# Patient Record
Sex: FEMALE | Race: WHITE | NOT HISPANIC OR LATINO | Employment: OTHER | URBAN - METROPOLITAN AREA
[De-identification: names, ages, dates, MRNs, and addresses within clinical notes are randomized per-mention and may not be internally consistent; named-entity substitution may affect disease eponyms.]

---

## 2017-09-07 ENCOUNTER — TRANSCRIBE ORDERS (OUTPATIENT)
Dept: ADMINISTRATIVE | Facility: HOSPITAL | Age: 57
End: 2017-09-07

## 2017-09-07 DIAGNOSIS — Z12.31 VISIT FOR SCREENING MAMMOGRAM: Primary | ICD-10-CM

## 2017-09-19 ENCOUNTER — HOSPITAL ENCOUNTER (OUTPATIENT)
Dept: MAMMOGRAPHY | Facility: HOSPITAL | Age: 57
Discharge: HOME | End: 2017-09-19
Attending: OBSTETRICS & GYNECOLOGY | Admitting: OBSTETRICS & GYNECOLOGY

## 2017-09-19 DIAGNOSIS — Z12.31 VISIT FOR SCREENING MAMMOGRAM: ICD-10-CM

## 2017-09-19 PROCEDURE — 77063 BREAST TOMOSYNTHESIS BI: CPT

## 2017-09-19 PROCEDURE — G0202 SCR MAMMO BI INCL CAD: HCPCS

## 2018-09-19 ENCOUNTER — TRANSCRIBE ORDERS (OUTPATIENT)
Dept: ADMINISTRATIVE | Facility: HOSPITAL | Age: 58
End: 2018-09-19

## 2018-09-19 DIAGNOSIS — Z12.31 VISIT FOR SCREENING MAMMOGRAM: Primary | ICD-10-CM

## 2018-10-17 ENCOUNTER — HOSPITAL ENCOUNTER (OUTPATIENT)
Dept: MAMMOGRAPHY | Facility: HOSPITAL | Age: 58
Discharge: HOME | End: 2018-10-17
Admitting: OBSTETRICS & GYNECOLOGY

## 2018-10-17 DIAGNOSIS — Z12.31 VISIT FOR SCREENING MAMMOGRAM: ICD-10-CM

## 2018-10-17 PROCEDURE — 77063 BREAST TOMOSYNTHESIS BI: CPT

## 2018-10-17 PROCEDURE — 77067 SCR MAMMO BI INCL CAD: CPT

## 2018-11-02 ENCOUNTER — TRANSCRIBE ORDERS (OUTPATIENT)
Dept: ADMINISTRATIVE | Facility: HOSPITAL | Age: 58
End: 2018-11-02

## 2018-11-02 DIAGNOSIS — N63.0 BREAST NODULE: Primary | ICD-10-CM

## 2018-11-07 ENCOUNTER — HOSPITAL ENCOUNTER (OUTPATIENT)
Dept: ULTRASOUND IMAGING | Facility: HOSPITAL | Age: 58
Discharge: HOME | End: 2018-11-07

## 2018-11-07 ENCOUNTER — HOSPITAL ENCOUNTER (OUTPATIENT)
Dept: MAMMOGRAPHY | Facility: HOSPITAL | Age: 58
Discharge: HOME | End: 2018-11-07
Admitting: OBSTETRICS & GYNECOLOGY

## 2018-11-07 DIAGNOSIS — N63.0 BREAST NODULE: ICD-10-CM

## 2018-11-07 PROCEDURE — G0279 TOMOSYNTHESIS, MAMMO: HCPCS

## 2018-11-07 PROCEDURE — 76642 ULTRASOUND BREAST LIMITED: CPT

## 2018-11-07 PROCEDURE — 77065 DX MAMMO INCL CAD UNI: CPT

## 2019-04-10 ENCOUNTER — TRANSCRIBE ORDERS (OUTPATIENT)
Dept: ADMINISTRATIVE | Facility: HOSPITAL | Age: 59
End: 2019-04-10

## 2019-04-10 DIAGNOSIS — R00.2 PALPITATIONS: Primary | ICD-10-CM

## 2019-04-12 ENCOUNTER — HOSPITAL ENCOUNTER (OUTPATIENT)
Dept: CARDIOLOGY | Facility: HOSPITAL | Age: 59
Discharge: HOME | End: 2019-04-12
Admitting: FAMILY MEDICINE

## 2019-04-12 DIAGNOSIS — R00.2 PALPITATIONS: ICD-10-CM

## 2019-04-12 PROCEDURE — 93226 XTRNL ECG REC<48 HR SCAN A/R: CPT

## 2019-04-12 PROCEDURE — 93225 XTRNL ECG REC<48 HRS REC: CPT

## 2019-04-17 PROCEDURE — 93227 XTRNL ECG REC<48 HR R&I: CPT | Performed by: INTERNAL MEDICINE

## 2019-05-08 ENCOUNTER — TRANSCRIBE ORDERS (OUTPATIENT)
Dept: ADMINISTRATIVE | Facility: HOSPITAL | Age: 59
End: 2019-05-08

## 2019-05-08 DIAGNOSIS — N60.01 BENIGN BREAST CYST IN FEMALE, RIGHT: Primary | ICD-10-CM

## 2019-05-14 ENCOUNTER — APPOINTMENT (OUTPATIENT)
Dept: MAMMOGRAPHY | Facility: HOSPITAL | Age: 59
End: 2019-05-14

## 2019-06-03 ENCOUNTER — HOSPITAL ENCOUNTER (OUTPATIENT)
Dept: ULTRASOUND IMAGING | Facility: HOSPITAL | Age: 59
Discharge: HOME | End: 2019-06-03

## 2019-06-03 ENCOUNTER — HOSPITAL ENCOUNTER (OUTPATIENT)
Dept: MAMMOGRAPHY | Facility: HOSPITAL | Age: 59
Discharge: HOME | End: 2019-06-03
Admitting: OBSTETRICS & GYNECOLOGY

## 2019-06-03 DIAGNOSIS — N60.01 BENIGN BREAST CYST IN FEMALE, RIGHT: ICD-10-CM

## 2019-06-03 PROCEDURE — 77065 DX MAMMO INCL CAD UNI: CPT

## 2019-06-03 PROCEDURE — 76642 ULTRASOUND BREAST LIMITED: CPT

## 2019-06-03 PROCEDURE — G0279 TOMOSYNTHESIS, MAMMO: HCPCS

## 2019-06-04 ENCOUNTER — TRANSCRIBE ORDERS (OUTPATIENT)
Dept: ADMINISTRATIVE | Facility: HOSPITAL | Age: 59
End: 2019-06-04

## 2019-06-04 DIAGNOSIS — N64.9 BREAST LESION: ICD-10-CM

## 2019-06-04 DIAGNOSIS — R92.8 ABNORMAL MAMMOGRAM: Primary | ICD-10-CM

## 2019-06-13 ENCOUNTER — HOSPITAL ENCOUNTER (OUTPATIENT)
Dept: MAMMOGRAPHY | Facility: HOSPITAL | Age: 59
Discharge: HOME | End: 2019-06-13
Admitting: RADIOLOGY

## 2019-06-13 DIAGNOSIS — R92.8 ABNORMAL MAMMOGRAM: ICD-10-CM

## 2019-06-13 DIAGNOSIS — N64.9 BREAST LESION: ICD-10-CM

## 2019-06-13 PROCEDURE — 88360 TUMOR IMMUNOHISTOCHEM/MANUAL: CPT | Performed by: OBSTETRICS & GYNECOLOGY

## 2019-06-13 PROCEDURE — 88305 TISSUE EXAM BY PATHOLOGIST: CPT | Performed by: OBSTETRICS & GYNECOLOGY

## 2019-06-13 PROCEDURE — 88342 IMHCHEM/IMCYTCHM 1ST ANTB: CPT | Performed by: OBSTETRICS & GYNECOLOGY

## 2019-06-13 PROCEDURE — 88341 IMHCHEM/IMCYTCHM EA ADD ANTB: CPT | Performed by: OBSTETRICS & GYNECOLOGY

## 2019-06-13 RX ORDER — LIDOCAINE HYDROCHLORIDE AND EPINEPHRINE 20; 5 MG/ML; UG/ML
10 INJECTION, SOLUTION EPIDURAL; INFILTRATION; INTRACAUDAL; PERINEURAL ONCE
Status: COMPLETED | OUTPATIENT
Start: 2019-06-13 | End: 2019-06-13

## 2019-06-13 RX ORDER — LIDOCAINE HYDROCHLORIDE 10 MG/ML
10 INJECTION, SOLUTION INFILTRATION; PERINEURAL ONCE
Status: COMPLETED | OUTPATIENT
Start: 2019-06-13 | End: 2019-06-13

## 2019-06-13 RX ADMIN — LIDOCAINE HYDROCHLORIDE 10 ML: 10 INJECTION, SOLUTION INFILTRATION; PERINEURAL at 14:15

## 2019-06-13 RX ADMIN — LIDOCAINE HYDROCHLORIDE,EPINEPHRINE BITARTRATE 10 ML: 20; .005 INJECTION, SOLUTION EPIDURAL; INFILTRATION; INTRACAUDAL; PERINEURAL at 14:15

## 2019-06-14 ENCOUNTER — APPOINTMENT (OUTPATIENT)
Dept: MAMMOGRAPHY | Facility: HOSPITAL | Age: 59
End: 2019-06-14

## 2019-06-19 ENCOUNTER — TELEPHONE (OUTPATIENT)
Dept: SURGERY | Facility: CLINIC | Age: 59
End: 2019-06-19

## 2019-06-19 NOTE — TELEPHONE ENCOUNTER
Breast MRI is scheduled on 6/20/2019 @ 7:45am.    Appointment with Dr. Ayala is on 6/24/2019 @ 8:30am.    Called and spoke to patient, patient expressed verbal understanding of appointment times.

## 2019-06-20 ENCOUNTER — HOSPITAL ENCOUNTER (OUTPATIENT)
Dept: MRI IMAGING | Facility: HOSPITAL | Age: 59
Discharge: HOME | End: 2019-06-20
Admitting: SURGERY

## 2019-06-20 DIAGNOSIS — Z17.0 MALIGNANT NEOPLASM OF RIGHT BREAST IN FEMALE, ESTROGEN RECEPTOR POSITIVE, UNSPECIFIED SITE OF BREAST (HCC): ICD-10-CM

## 2019-06-20 DIAGNOSIS — C50.911 MALIGNANT NEOPLASM OF RIGHT BREAST IN FEMALE, ESTROGEN RECEPTOR POSITIVE, UNSPECIFIED SITE OF BREAST (HCC): ICD-10-CM

## 2019-06-20 LAB
CYTO UR: NORMAL
LAB AP CASE REPORT: NORMAL
LAB AP DIAGNOSIS COMMENT: NORMAL
LAB AP SPECIAL STAINS: NORMAL
LAB AP SYNOPTIC CHECKLIST: NORMAL
PATH REPORT.ADDENDUM SPEC: NORMAL
PATH REPORT.FINAL DX SPEC: NORMAL
PATH REPORT.GROSS SPEC: NORMAL

## 2019-06-20 PROCEDURE — 0 GADOBENATE DIMEGLUMINE 529 MG/ML SOLUTION: Performed by: SURGERY

## 2019-06-20 PROCEDURE — A9577 INJ MULTIHANCE: HCPCS | Performed by: SURGERY

## 2019-06-20 PROCEDURE — 77049 MRI BREAST C-+ W/CAD BI: CPT

## 2019-06-20 PROCEDURE — 82565 ASSAY OF CREATININE: CPT

## 2019-06-20 RX ADMIN — GADOBENATE DIMEGLUMINE 20 ML: 529 INJECTION, SOLUTION INTRAVENOUS at 08:36

## 2019-06-21 LAB — CREAT BLDA-MCNC: 0.8 MG/DL (ref 0.6–1.3)

## 2019-06-24 ENCOUNTER — OFFICE VISIT (OUTPATIENT)
Dept: SURGERY | Facility: CLINIC | Age: 59
End: 2019-06-24

## 2019-06-24 ENCOUNTER — TELEPHONE (OUTPATIENT)
Dept: SURGERY | Facility: CLINIC | Age: 59
End: 2019-06-24

## 2019-06-24 ENCOUNTER — PREP FOR SURGERY (OUTPATIENT)
Dept: OTHER | Facility: HOSPITAL | Age: 59
End: 2019-06-24

## 2019-06-24 VITALS
SYSTOLIC BLOOD PRESSURE: 130 MMHG | DIASTOLIC BLOOD PRESSURE: 78 MMHG | HEART RATE: 87 BPM | WEIGHT: 212 LBS | OXYGEN SATURATION: 97 % | HEIGHT: 62 IN | BODY MASS INDEX: 39.01 KG/M2

## 2019-06-24 DIAGNOSIS — C50.311 MALIGNANT NEOPLASM OF LOWER-INNER QUADRANT OF RIGHT BREAST OF FEMALE, ESTROGEN RECEPTOR POSITIVE (HCC): Primary | ICD-10-CM

## 2019-06-24 DIAGNOSIS — Z17.0 MALIGNANT NEOPLASM OF LOWER-INNER QUADRANT OF RIGHT BREAST OF FEMALE, ESTROGEN RECEPTOR POSITIVE (HCC): Primary | ICD-10-CM

## 2019-06-24 PROCEDURE — 99244 OFF/OP CNSLTJ NEW/EST MOD 40: CPT | Performed by: SURGERY

## 2019-06-24 RX ORDER — LIRAGLUTIDE 6 MG/ML
INJECTION, SOLUTION SUBCUTANEOUS
COMMUNITY
Start: 2019-06-16 | End: 2021-11-22

## 2019-06-24 RX ORDER — HEPARIN SODIUM 5000 [USP'U]/ML
5000 INJECTION, SOLUTION INTRAVENOUS; SUBCUTANEOUS
Status: CANCELLED | OUTPATIENT
Start: 2019-07-08 | End: 2019-07-09

## 2019-06-24 RX ORDER — CEFAZOLIN SODIUM 2 G/100ML
2 INJECTION, SOLUTION INTRAVENOUS ONCE
Status: CANCELLED | OUTPATIENT
Start: 2019-07-08 | End: 2019-06-24

## 2019-06-24 NOTE — PROGRESS NOTES
[facility]     Referring Provider: George Strange MD    Chief complaint: Newly diagnosed breast cancer     HPI: Ms. Emily Azevedo is a 60 yo woman, seen at the request of Dr. George Strange, for a new diagnosis of right breast cancer. This was initially detected as an imaging abnormality. Her work-up is detailed in the oncologic history below. She denies any  breast lumps, pain, skin changes, or nipple discharge. She has a past history of a benign right breast biopsy in 2006. She denies any family history of breast or ovarian cancer. She was joined today in clinic by her , Mendez. She gave consent for her  to be present during her examination and participate in the discussion.       Oncology/Hematology History    9/19/17, Screening MMG with Daniel ( [facility]):  BI-RADS 2: Benign.     [facility]17[facility]18[facility]  There are scattered areas of fibroglandular density. Previous percutaneous biopsy clip marker in the upper central right breast. There is a new, irregular nodular opacity in the deep medial right breast along the 3:00 axis near the chest wall measuring just over 1 cm in size, newly visible since prior studies. There is a new circumscribed nodule in the medial right breast about 4 cm from the nipple that is enlarged since the prior exam. This may represent a cyst.                                 BI-RADS 0: Incomplete.    11/07/18, Right Diagnostic MMG & Right Breast US ([facility]):  MMG:  Small focal ill-defined opacity is again noted within the deep medial right breast along the 3:00 axis. This is visible in retrospect on each of the prior studies, and may therefore potentially represent focally prominent normal fibroglandular tissue.   US:  Despite prolonged imaging, no focal lesion could be identified within this portion of the medial right breast. Two benign cysts in the medial right breast near the nipple measuring 0.9 cm and 0.6 cm, corresponding to  benign-appearing nodular opacities visible on mammogram today.        BI-RADS 3: Probably benign. Follow-up MMG in six months.        Malignant neoplasm of lower-inner quadrant of right breast of female, estrogen receptor positive (CMS/HCC)    6/2/2019 Initial Diagnosis     Malignant neoplasm of lower-inner quadrant of right breast of female, estrogen receptor positive (CMS/HCC)         6/3/2019 Imaging     Right Diagnostic MMG & Right Breast US ( [facility]):    MMG:  Small focal irregular opacity is again noted along the 3:00 axis in the deep portion of the right breast about 9 cm from the nipple measuring about 8 mm. Linear opacities surround the lesion in a radial pattern. The morphology is concerning, particularly on tomogram images today.   US:  High-resolution grayscale ultrasound imaging directed to this portion of the breast today does show a subtle focus of acoustic shadowing, although mass is not clearly delineated.   BI-RADS 4: Suspicious.         6/13/2019 Biopsy     Right breast, Stereotactic biopsy ( [facility]):    1. Breast, Right Deep Medial 3 O'clock Position, Core Biopsy: Breast parenchyma with               A.  Minimally represented invasive carcinoma of no special type (ductal) measuring 2.8 mm maximally                     on the slide, Meghan histologic grade I (tubules = 1, nuclei = 1, mitoses = 1), with associated ductal                     carcinoma in situ (DCIS) with cribriform and micropapillary architecture and low nuclear grade.    ER+ (%, strong)  WA+ (41-50%, moderate)  HER2 negative (IHC 1+)   Ki-67 1%         6/20/2019 Imaging     Bilateral Breast MRI ( [facility]):  The mammogram showed a very tiny cluster of microcalcifications in the lower inner quadrant of the right breast that were biopsied under stereotactic guidance. The biopsy marking clip is identified in the lower inner quadrant at approximately the 4:00 location. It is contained within the inferior aspect of a tubular shaped  "biopsy tract containing a small amount of subacute hemorrhage. This measures 9 mm in width and 4.7 cm in length. There is only a thin rind of low-level enhancement surrounding the biopsy clip and the tract that is consistent with enhancement due to postoperative change. No suspicious above threshold enhancement is identified. There is no MRI evidence of significant residual neoplasm. The patient certainly appears to be a candidate for breast conservation. No discrete mass is identified. Incidental note is made of an 8 mm diameter cyst in the anterior aspect of the right breast at the 3:00 location. There are a few scattered benign-appearing stippled areas of enhancement within the left breast. A small briskly enhancing lymph node is also noted at the 3:00 location within the middle third of the left breast. There is no axillary or internal mammary lymphadenopathy. The chest wall is unremarkable.   BI-RADS 6: Known malignancy.            Review of Systems   Constitutional: Positive for diaphoresis, fatigue and unexpected weight change. Negative for appetite change, chills and fever.        Postmenopausal symptoms   HENT:   Positive for mouth sores and tinnitus. Negative for hearing loss, lump/mass, nosebleeds, sore throat, trouble swallowing and voice change.         Ringing in ears due to hearing aides.  \"cold sores\"    Eyes: Negative for eye problems and icterus.   Respiratory: Negative for chest tightness, cough, hemoptysis, shortness of breath and wheezing.    Cardiovascular: Positive for palpitations. Negative for chest pain and leg swelling.        Followed by Dr. Cheng.   Gastrointestinal: Negative for abdominal distention, abdominal pain, blood in stool, constipation, diarrhea, nausea, rectal pain and vomiting.   Endocrine: Positive for hot flashes.   Genitourinary: Negative for bladder incontinence, difficulty urinating, dyspareunia, dysuria, frequency, hematuria, menstrual problem, nocturia, pelvic pain, " vaginal bleeding and vaginal discharge.    Musculoskeletal: Negative for arthralgias, back pain, flank pain, gait problem, myalgias, neck pain and neck stiffness.   Skin: Negative for itching, rash and wound.   Neurological: Positive for dizziness. Negative for extremity weakness, gait problem, headaches, light-headedness, numbness, seizures and speech difficulty.        Balance problems, due to vertigo   Hematological: Negative for adenopathy. Does not bruise/bleed easily.   Psychiatric/Behavioral: Negative for confusion, decreased concentration, depression, sleep disturbance and suicidal ideas. The patient is not nervous/anxious.    I personally reviewed and updated the ROS.      Medications:    Current Outpatient Medications:   •  celecoxib (CeleBREX) 200 MG capsule, Take 200 mg by mouth 2 (two) times a day. LAST DOSE 7/17/16, Disp: , Rfl:   •  cetirizine (ZyrTEC) 10 MG tablet, Take 10 mg by mouth daily., Disp: , Rfl:   •  diclofenac (VOLTAREN) 1 % gel gel, Apply 4 g topically 4 (four) times a day as needed., Disp: , Rfl:   •  DULoxetine (CYMBALTA) 60 MG capsule, Take 60 mg by mouth daily. LAST DOSE 7/17/16, Disp: , Rfl:   •  levothyroxine (SYNTHROID, LEVOTHROID) 100 MCG tablet, Take 100 mcg by mouth daily., Disp: , Rfl:   •  losartan-hydrochlorothiazide (HYZAAR) 100-12.5 MG per tablet, Take 1 tablet by mouth daily., Disp: , Rfl:   •  SAXENDA 18 MG/3ML solution pen-injector, , Disp: , Rfl:       Allergies:  No Known Allergies      Past Medical History:   Diagnosis Date   • Arthritis    • Carpal tunnel syndrome    • Disease of thyroid gland    • Graves disease    • Hearing loss     earing aids    • Hypertension    • Malignant neoplasm of lower-inner quadrant of right breast of female, estrogen receptor positive (CMS/HCC) 6/24/2019   • Palpitations     Dr. Cheng PMD Benign    • Vertigo        Past Surgical History:   Procedure Laterality Date   • CARPAL TUNNEL RELEASE Right    • CERVICAL CONE BIOPSY     •  GALLBLADDER SURGERY     • LAPAROSCOPIC TUBAL LIGATION     • OOPHORECTOMY     • [place] TOTAL ABDOM HYSTERECTOMY Bilateral 2016    Procedure: TOTAL ABDOMINAL HYSTERECTOMY W/ BSO ;  Surgeon: George Strange MD;  Location: [facility] OR;  Service: Obstetrics/Gynecology   • ROTATOR CUFF REPAIR Right    • TONSILLECTOMY         Family History   Problem Relation Age of Onset   • Lymphoma Paternal Uncle 30        non hodgkins   • Lymphoma Nephew 16        non hodgkins   • Breast cancer Neg Hx        Social History     Socioeconomic History   • Marital status:      Spouse name: Mendez   • Number of children: 2   • Years of education: Not on file   • Highest education level: Not on file   Occupational History   • Occupation: Retired   Tobacco Use   • Smoking status: Never Smoker   • Smokeless tobacco: Never Used   Substance and Sexual Activity   • Alcohol use: No   • Drug use: No   • Sexual activity: Defer   Social History Narrative    2 sons     Patient drinks 1-2 servings of caffeine per day.       GYNECOLOGIC HISTORY:   . P: 2. AB: 0.  Last menstrual period: ~52 yo natural menopause, then sp KIAH/BSO in  for bleeding  Age at menarche: 9  Age at first childbirth: 19  Lactation/How long: no  Age at menopause: 53  Total years of oral contraceptive use: none  Total years of hormone replacement therapy: none      PHYSICAL EXAMINATION:   Vitals:    19 0825   BP: 130/78   Pulse: 87   SpO2: 97%     ECOG 0 - Asymptomatic  General: NAD, well appearing  Psych: a&o x 3, normal mood and affect  Eyes: EOMI, no scleral icterus  ENMT: neck supple without masses or thyromegaly, mucus membranes moist  Resp: normal effort, CTAB  CV: RRR, no murmurs, no edema   GI: soft, NT, ND  MSK: normal gait, normal ROM in bilateral shoulders  Lymph nodes: no cervical, supraclavicular or axillary lymphadenopathy  Breast: symmetric, large size, grade 3 ptosis  Right: There is an UIQ biopsy site and LIQ ecchymoses,  otherwise no visible abnormalities on inspection while seated, with arms raised or hands on hips. No masses or nipple abnormalities.  Left: No visible abnormalities on inspection while seated, with arms raised or hands on hips. No masses, skin changes, or nipple abnormalities.      Right breast, in-office ultrasound: At 4:00, 9 cm FN, there is a biopsy clip surrounded by a small postbiopsy hematoma. This is about 1 cm deep to the skin.       I have independently reviewed her imaging and here are my findings:   In the right lower inner breast, there was initially an 8 mm area of irregular opacity on MMG. There was no residual enhancement on MRI postbiopsy.      Assessment:  59 y.o. F with a new diagnosis of right breast cancer: low grade, invasice ductal carcinoma, ER/NC+, Her2 negative, with associated DCIS; clinical T1aN0, anatomic stage IA, prognostic stage IA.      Discussion:  I had an extensive discussion with the patient and her  about the nature of her breast cancer diagnosis. We reviewed the components of breast tissue including ducts and lobules. We reviewed her pathology report in detail. We reviewed breast cancer histology, including stage, grade, ER/NC receptors, HER2 receptors and how this applies to her diagnosis. We reviewed the basics of systemic and local/regional management of breast cancer.     We discussed that in her case, systemic treatment would involve endocrine therapy and possibly chemotherapy. She will be referred to medical oncology postoperatively to discuss these issues further. We reviewed potential surgical treatments to include partial mastectomy (with radiation), mastectomy (with or without reconstruction), sentinel lymph node biopsy and axillary node dissection and discussed the rationale associated with each approach. Regarding radiation therapy, we discussed that radiation is indicated in all cases of breast conservation and in only limited circumstances following  mastectomy. We discussed that the primary goal of adjuvant radiation is to decrease the likelihood of local recurrence.     In her case, she is a good candidate for lumpectomy and she would like to proceed with breast conservation. We discussed that lumpectomy would require pre-operative wire-localization, as this was a mammographically detected lesion. We also discussed the risk of positive margins and that she must have negative margins for lumpectomy to be an appropriate oncologic procedure. I will make every effort to obtain negative margins at her initial operation, but there is a 10-15% chance that she will require a second operation for re-excision, or possibly a total mastectomy. We will not know the margin status until after her final pathology has returned.     We discussed axillary staging. I described the procedure for sentinel lymph node biopsy in detail, including the preoperative injection of technetium sulfur colloid and intraoperative injection of lymphazurin blue dye. I explained that this is a mapping test and not a cancer test, that all of the lymph nodes containing these dyes will be removed for complete testing by pathology, and that the results could impact the decision for adjuvant treatment or additional surgery.    I described additional risks and potential complications associated with surgery, including, but not limited to, bleeding, infection, complications related to blue dye, lymphedema, deformity/poor cosmetic result, chronic pain, neurovascular injury, numbness, seroma, hematoma, deep venous thrombosis, skin flap necrosis, disease recurrence and the possibility of requiring additional surgery. We also discussed other treatment options including the option of not undergoing any surgical treatment and the risks associated with this including disease progression. She expressed an understanding of these factors and wished to proceed.    Plan:  A multidisciplinary plan has been formulated  for the patient:      (1) Surgical Oncology:  -Right needle-localized partial mastectomy and sentinel lymph node biopsy    (2) Medical Oncology  -Will refer post-operatively.    (3) Radiation Oncology  -Will refer post-operatively.    Evelin Ayala MD    I have spent 60 min in face to face time with the patient and 45 min of this time was spent in counseling on the above stated issues.       CC:  MD Gopal Prince MD

## 2019-06-26 ENCOUNTER — TELEPHONE (OUTPATIENT)
Dept: SURGERY | Facility: CLINIC | Age: 59
End: 2019-06-26

## 2019-06-26 ENCOUNTER — TRANSCRIBE ORDERS (OUTPATIENT)
Dept: SURGERY | Facility: CLINIC | Age: 59
End: 2019-06-26

## 2019-06-26 DIAGNOSIS — Z17.0 MALIGNANT NEOPLASM OF LOWER-INNER QUADRANT OF RIGHT BREAST OF FEMALE, ESTROGEN RECEPTOR POSITIVE (HCC): Primary | ICD-10-CM

## 2019-06-26 DIAGNOSIS — C50.311 MALIGNANT NEOPLASM OF LOWER-INNER QUADRANT OF RIGHT BREAST OF FEMALE, ESTROGEN RECEPTOR POSITIVE (HCC): Primary | ICD-10-CM

## 2019-06-26 NOTE — TELEPHONE ENCOUNTER
Surgery is scheduled on 7/8/2019 @ 2:00pm, NL @ 12:30pm,  SI @ 8:00am.    PAT is scheduled on 6/28/2019 @ 1:30pm.    Post-op appt is scheduled on 7/23/2019 @ 11:00am.    Called and spoke to patient, patient expressed verbal understanding of appointment times, sent patient a reminder letter in the mail.

## 2019-06-27 ENCOUNTER — APPOINTMENT (OUTPATIENT)
Dept: PREADMISSION TESTING | Facility: HOSPITAL | Age: 59
End: 2019-06-27

## 2019-06-28 ENCOUNTER — TELEPHONE (OUTPATIENT)
Dept: OTHER | Facility: HOSPITAL | Age: 59
End: 2019-06-28

## 2019-06-28 ENCOUNTER — TELEPHONE (OUTPATIENT)
Dept: SURGERY | Facility: CLINIC | Age: 59
End: 2019-06-28

## 2019-06-28 ENCOUNTER — DOCUMENTATION (OUTPATIENT)
Dept: OTHER | Facility: HOSPITAL | Age: 59
End: 2019-06-28

## 2019-06-28 ENCOUNTER — APPOINTMENT (OUTPATIENT)
Dept: PREADMISSION TESTING | Facility: HOSPITAL | Age: 59
End: 2019-06-28

## 2019-06-28 VITALS
DIASTOLIC BLOOD PRESSURE: 65 MMHG | OXYGEN SATURATION: 97 % | SYSTOLIC BLOOD PRESSURE: 103 MMHG | RESPIRATION RATE: 16 BRPM | HEIGHT: 62 IN | TEMPERATURE: 97.6 F | WEIGHT: 211.19 LBS | HEART RATE: 77 BPM | BODY MASS INDEX: 38.86 KG/M2

## 2019-06-28 DIAGNOSIS — Z17.0 MALIGNANT NEOPLASM OF LOWER-INNER QUADRANT OF RIGHT BREAST OF FEMALE, ESTROGEN RECEPTOR POSITIVE (HCC): ICD-10-CM

## 2019-06-28 DIAGNOSIS — C50.311 MALIGNANT NEOPLASM OF LOWER-INNER QUADRANT OF RIGHT BREAST OF FEMALE, ESTROGEN RECEPTOR POSITIVE (HCC): ICD-10-CM

## 2019-06-28 LAB
ANION GAP SERPL CALCULATED.3IONS-SCNC: 12.5 MMOL/L (ref 5–15)
BASOPHILS # BLD AUTO: 0.03 10*3/MM3 (ref 0–0.2)
BASOPHILS NFR BLD AUTO: 0.4 % (ref 0–1.5)
BUN BLD-MCNC: 18 MG/DL (ref 8–23)
BUN/CREAT SERPL: 22.5 (ref 7–25)
CALCIUM SPEC-SCNC: 9.6 MG/DL (ref 8.6–10.5)
CHLORIDE SERPL-SCNC: 102 MMOL/L (ref 98–107)
CO2 SERPL-SCNC: 28.5 MMOL/L (ref 22–29)
CREAT BLD-MCNC: 0.8 MG/DL (ref 0.57–1)
DEPRECATED RDW RBC AUTO: 44.6 FL (ref 37–54)
EOSINOPHIL # BLD AUTO: 0.3 10*3/MM3 (ref 0–0.4)
EOSINOPHIL NFR BLD AUTO: 3.8 % (ref 0.3–6.2)
ERYTHROCYTE [DISTWIDTH] IN BLOOD BY AUTOMATED COUNT: 13.1 % (ref 12.3–15.4)
GFR SERPL CREATININE-BSD FRML MDRD: 73 ML/MIN/1.73
GLUCOSE BLD-MCNC: 109 MG/DL (ref 65–99)
HCT VFR BLD AUTO: 43.3 % (ref 34–46.6)
HGB BLD-MCNC: 14.2 G/DL (ref 12–15.9)
IMM GRANULOCYTES # BLD AUTO: 0.03 10*3/MM3 (ref 0–0.05)
IMM GRANULOCYTES NFR BLD AUTO: 0.4 % (ref 0–0.5)
LYMPHOCYTES # BLD AUTO: 2.26 10*3/MM3 (ref 0.7–3.1)
LYMPHOCYTES NFR BLD AUTO: 28.3 % (ref 19.6–45.3)
MCH RBC QN AUTO: 30.7 PG (ref 26.6–33)
MCHC RBC AUTO-ENTMCNC: 32.8 G/DL (ref 31.5–35.7)
MCV RBC AUTO: 93.7 FL (ref 79–97)
MONOCYTES # BLD AUTO: 0.56 10*3/MM3 (ref 0.1–0.9)
MONOCYTES NFR BLD AUTO: 7 % (ref 5–12)
NEUTROPHILS # BLD AUTO: 4.81 10*3/MM3 (ref 1.7–7)
NEUTROPHILS NFR BLD AUTO: 60.1 % (ref 42.7–76)
NRBC BLD AUTO-RTO: 0 /100 WBC (ref 0–0.2)
PLATELET # BLD AUTO: 230 10*3/MM3 (ref 140–450)
PMV BLD AUTO: 11.4 FL (ref 6–12)
POTASSIUM BLD-SCNC: 4.2 MMOL/L (ref 3.5–5.2)
RBC # BLD AUTO: 4.62 10*6/MM3 (ref 3.77–5.28)
SODIUM BLD-SCNC: 143 MMOL/L (ref 136–145)
WBC NRBC COR # BLD: 7.99 10*3/MM3 (ref 3.4–10.8)

## 2019-06-28 PROCEDURE — 36415 COLL VENOUS BLD VENIPUNCTURE: CPT

## 2019-06-28 PROCEDURE — 93005 ELECTROCARDIOGRAM TRACING: CPT

## 2019-06-28 PROCEDURE — 93010 ELECTROCARDIOGRAM REPORT: CPT | Performed by: INTERNAL MEDICINE

## 2019-06-28 PROCEDURE — 80048 BASIC METABOLIC PNL TOTAL CA: CPT | Performed by: SURGERY

## 2019-06-28 PROCEDURE — 85025 COMPLETE CBC W/AUTO DIFF WBC: CPT | Performed by: SURGERY

## 2019-06-28 NOTE — PROGRESS NOTES
Emily stopped by the [facility] after her PAT appointment and we discussed resources available and questions. She is scheduled for a Lumpectomy on July 8th and she has a good understanding of her pathology and surgery procedure that was presented to her by Dr. Ayala. She has a question about how long it will take to get her surgery pathology back, and I told her generally 2 days or so. She asked if I had any information about radiation therapy and  I gave her the Radiation booklet from [facility].  She was emotional at times during our discussion and we discussed that we have counseling services available through our [facility]. She declined the need for that at present, but was thankful to know it is available.     We discussed integrative therapies and other services at the [facility] including the opportunity to speak with our Oncology Dietitian or Oncology Social Worker. I gave her a navigation folder with the following information:    [facility] Cancer Support Network, Sharing Our Stories Breast Cancer Support Group, Cancer and Restorative Exercise (CARE), [facility] Exercise program, Together for Breast Cancer Survival, For Women Facing Breast Cancer, Road to Recovery, Bioimpedeance, Cancer Resource Center, Massage Therapy, Reiki Therapy, [employer] [facility], Cancer Nutrition, Survivorship Clinic, Cancer and Career, [facility]    She verbalized appreciation for navigational services and she has my contact information and will call with any questions that arise.

## 2019-06-28 NOTE — TELEPHONE ENCOUNTER
I called Emily to introduce myself and navigational services. She has a PAT appointment today at 1:30 and she has agreed to come by my office afterwards to go over resources and answer any further questions. She has my contact number is anything comes up.

## 2019-06-28 NOTE — TELEPHONE ENCOUNTER
Pt called c/o just went for PAT testing and was told she needed to contact our office regarding her Celebrex.  Pt takes one po qd and wants to know how long does she need to stay off of this medication prior to surgery?      Pt call back number is [phone] Ok to leave detailed message per pt.

## 2019-06-28 NOTE — TELEPHONE ENCOUNTER
Spoke to Dr Ayala-pt notified to stop taking Celebrex 1 week prior to surgery.  Pt voiced understanding.

## 2019-07-08 ENCOUNTER — ANESTHESIA EVENT (OUTPATIENT)
Dept: PERIOP | Facility: HOSPITAL | Age: 59
End: 2019-07-08

## 2019-07-08 ENCOUNTER — HOSPITAL ENCOUNTER (OUTPATIENT)
Dept: MAMMOGRAPHY | Facility: HOSPITAL | Age: 59
Discharge: HOME | End: 2019-07-08

## 2019-07-08 ENCOUNTER — APPOINTMENT (OUTPATIENT)
Dept: GENERAL RADIOLOGY | Facility: HOSPITAL | Age: 59
End: 2019-07-08

## 2019-07-08 ENCOUNTER — ANESTHESIA (OUTPATIENT)
Dept: PERIOP | Facility: HOSPITAL | Age: 59
End: 2019-07-08

## 2019-07-08 ENCOUNTER — HOSPITAL ENCOUNTER (OUTPATIENT)
Facility: HOSPITAL | Age: 59
Setting detail: HOSPITAL OUTPATIENT SURGERY
Discharge: HOME | End: 2019-07-08
Attending: SURGERY | Admitting: SURGERY

## 2019-07-08 ENCOUNTER — HOSPITAL ENCOUNTER (OUTPATIENT)
Dept: NUCLEAR MEDICINE | Facility: HOSPITAL | Age: 59
Discharge: HOME | End: 2019-07-08

## 2019-07-08 VITALS
RESPIRATION RATE: 16 BRPM | OXYGEN SATURATION: 97 % | DIASTOLIC BLOOD PRESSURE: 82 MMHG | HEART RATE: 77 BPM | WEIGHT: 212.3 LBS | TEMPERATURE: 97.9 F | SYSTOLIC BLOOD PRESSURE: 141 MMHG | BODY MASS INDEX: 38.83 KG/M2

## 2019-07-08 DIAGNOSIS — C50.311 MALIGNANT NEOPLASM OF LOWER-INNER QUADRANT OF RIGHT BREAST OF FEMALE, ESTROGEN RECEPTOR POSITIVE (HCC): ICD-10-CM

## 2019-07-08 DIAGNOSIS — Z17.0 MALIGNANT NEOPLASM OF LOWER-INNER QUADRANT OF RIGHT BREAST OF FEMALE, ESTROGEN RECEPTOR POSITIVE (HCC): ICD-10-CM

## 2019-07-08 PROCEDURE — A9541 TC99M SULFUR COLLOID: HCPCS | Performed by: SURGERY

## 2019-07-08 PROCEDURE — 25010000002 HEPARIN (PORCINE) PER 1000 UNITS: Performed by: SURGERY

## 2019-07-08 PROCEDURE — 76098 X-RAY EXAM SURGICAL SPECIMEN: CPT

## 2019-07-08 PROCEDURE — 25010000002 FENTANYL CITRATE (PF) 100 MCG/2ML SOLUTION: Performed by: ANESTHESIOLOGY

## 2019-07-08 PROCEDURE — 38900 IO MAP OF SENT LYMPH NODE: CPT | Performed by: SURGERY

## 2019-07-08 PROCEDURE — 88307 TISSUE EXAM BY PATHOLOGIST: CPT | Performed by: SURGERY

## 2019-07-08 PROCEDURE — 38525 BIOPSY/REMOVAL LYMPH NODES: CPT | Performed by: SURGERY

## 2019-07-08 PROCEDURE — 38792 RA TRACER ID OF SENTINL NODE: CPT

## 2019-07-08 PROCEDURE — 19301 PARTIAL MASTECTOMY: CPT | Performed by: SURGERY

## 2019-07-08 PROCEDURE — 25010000003 CEFAZOLIN IN DEXTROSE 2-4 GM/100ML-% SOLUTION: Performed by: SURGERY

## 2019-07-08 PROCEDURE — 78195 LYMPH SYSTEM IMAGING: CPT | Performed by: SURGERY

## 2019-07-08 PROCEDURE — 25010000002 NEOSTIGMINE PER 0.5 MG: Performed by: ANESTHESIOLOGY

## 2019-07-08 PROCEDURE — 0 TECHNETIUM FILTERED SULFUR COLLOID: Performed by: SURGERY

## 2019-07-08 PROCEDURE — 25010000002 PROPOFOL 10 MG/ML EMULSION: Performed by: NURSE ANESTHETIST, CERTIFIED REGISTERED

## 2019-07-08 PROCEDURE — 25010000002 ONDANSETRON PER 1 MG: Performed by: NURSE ANESTHETIST, CERTIFIED REGISTERED

## 2019-07-08 PROCEDURE — 25010000003 LIDOCAINE 1 % SOLUTION: Performed by: SURGERY

## 2019-07-08 PROCEDURE — 25010000002 DEXAMETHASONE PER 1 MG: Performed by: NURSE ANESTHETIST, CERTIFIED REGISTERED

## 2019-07-08 RX ORDER — OXYCODONE HYDROCHLORIDE 5 MG/1
5 TABLET ORAL EVERY 4 HOURS PRN
Qty: 20 TABLET | Refills: 0 | Status: SHIPPED | OUTPATIENT
Start: 2019-07-08 | End: 2019-07-23

## 2019-07-08 RX ORDER — ROCURONIUM BROMIDE 10 MG/ML
INJECTION, SOLUTION INTRAVENOUS AS NEEDED
Status: DISCONTINUED | OUTPATIENT
Start: 2019-07-08 | End: 2019-07-08 | Stop reason: SURG

## 2019-07-08 RX ORDER — MIDAZOLAM HYDROCHLORIDE 1 MG/ML
2 INJECTION INTRAMUSCULAR; INTRAVENOUS
Status: DISCONTINUED | OUTPATIENT
Start: 2019-07-08 | End: 2019-07-08 | Stop reason: HOSPADM

## 2019-07-08 RX ORDER — FLUMAZENIL 0.1 MG/ML
0.2 INJECTION INTRAVENOUS AS NEEDED
Status: DISCONTINUED | OUTPATIENT
Start: 2019-07-08 | End: 2019-07-08 | Stop reason: HOSPADM

## 2019-07-08 RX ORDER — GLYCOPYRROLATE 0.2 MG/ML
INJECTION INTRAMUSCULAR; INTRAVENOUS AS NEEDED
Status: DISCONTINUED | OUTPATIENT
Start: 2019-07-08 | End: 2019-07-08 | Stop reason: SURG

## 2019-07-08 RX ORDER — LIDOCAINE HYDROCHLORIDE 40 MG/ML
SOLUTION TOPICAL AS NEEDED
Status: DISCONTINUED | OUTPATIENT
Start: 2019-07-08 | End: 2019-07-08 | Stop reason: SURG

## 2019-07-08 RX ORDER — LIDOCAINE HYDROCHLORIDE 40 MG/ML
SOLUTION TOPICAL
Status: COMPLETED
Start: 2019-07-08 | End: 2019-07-08

## 2019-07-08 RX ORDER — MIDAZOLAM HYDROCHLORIDE 1 MG/ML
1 INJECTION INTRAMUSCULAR; INTRAVENOUS
Status: DISCONTINUED | OUTPATIENT
Start: 2019-07-08 | End: 2019-07-08 | Stop reason: HOSPADM

## 2019-07-08 RX ORDER — ACETAMINOPHEN 650 MG/1
650 SUPPOSITORY RECTAL ONCE AS NEEDED
Status: DISCONTINUED | OUTPATIENT
Start: 2019-07-08 | End: 2019-07-08 | Stop reason: HOSPADM

## 2019-07-08 RX ORDER — HYDRALAZINE HYDROCHLORIDE 20 MG/ML
5 INJECTION INTRAMUSCULAR; INTRAVENOUS
Status: DISCONTINUED | OUTPATIENT
Start: 2019-07-08 | End: 2019-07-08 | Stop reason: HOSPADM

## 2019-07-08 RX ORDER — ACETAMINOPHEN 325 MG/1
650 TABLET ORAL ONCE AS NEEDED
Status: DISCONTINUED | OUTPATIENT
Start: 2019-07-08 | End: 2019-07-08 | Stop reason: HOSPADM

## 2019-07-08 RX ORDER — HYDROCODONE BITARTRATE AND ACETAMINOPHEN 7.5; 325 MG/1; MG/1
1 TABLET ORAL ONCE AS NEEDED
Status: COMPLETED | OUTPATIENT
Start: 2019-07-08 | End: 2019-07-08

## 2019-07-08 RX ORDER — FAMOTIDINE 10 MG/ML
20 INJECTION, SOLUTION INTRAVENOUS ONCE
Status: COMPLETED | OUTPATIENT
Start: 2019-07-08 | End: 2019-07-08

## 2019-07-08 RX ORDER — HEPARIN SODIUM 5000 [USP'U]/ML
5000 INJECTION, SOLUTION INTRAVENOUS; SUBCUTANEOUS
Status: COMPLETED | OUTPATIENT
Start: 2019-07-08 | End: 2019-07-08

## 2019-07-08 RX ORDER — LIDOCAINE HYDROCHLORIDE 10 MG/ML
3 INJECTION, SOLUTION INFILTRATION; PERINEURAL ONCE
Status: COMPLETED | OUTPATIENT
Start: 2019-07-08 | End: 2019-07-08

## 2019-07-08 RX ORDER — NALOXONE HCL 0.4 MG/ML
0.2 VIAL (ML) INJECTION AS NEEDED
Status: DISCONTINUED | OUTPATIENT
Start: 2019-07-08 | End: 2019-07-08 | Stop reason: HOSPADM

## 2019-07-08 RX ORDER — EPHEDRINE SULFATE 50 MG/ML
5 INJECTION, SOLUTION INTRAVENOUS ONCE AS NEEDED
Status: DISCONTINUED | OUTPATIENT
Start: 2019-07-08 | End: 2019-07-08 | Stop reason: HOSPADM

## 2019-07-08 RX ORDER — SODIUM CHLORIDE, SODIUM LACTATE, POTASSIUM CHLORIDE, CALCIUM CHLORIDE 600; 310; 30; 20 MG/100ML; MG/100ML; MG/100ML; MG/100ML
9 INJECTION, SOLUTION INTRAVENOUS CONTINUOUS
Status: DISCONTINUED | OUTPATIENT
Start: 2019-07-08 | End: 2019-07-08 | Stop reason: HOSPADM

## 2019-07-08 RX ORDER — DIPHENHYDRAMINE HYDROCHLORIDE 50 MG/ML
12.5 INJECTION INTRAMUSCULAR; INTRAVENOUS
Status: DISCONTINUED | OUTPATIENT
Start: 2019-07-08 | End: 2019-07-08 | Stop reason: HOSPADM

## 2019-07-08 RX ORDER — DIAZEPAM 5 MG/1
5 TABLET ORAL ONCE
Status: COMPLETED | OUTPATIENT
Start: 2019-07-08 | End: 2019-07-08

## 2019-07-08 RX ORDER — LIDOCAINE HYDROCHLORIDE 10 MG/ML
0.5 INJECTION, SOLUTION EPIDURAL; INFILTRATION; INTRACAUDAL; PERINEURAL ONCE AS NEEDED
Status: DISCONTINUED | OUTPATIENT
Start: 2019-07-08 | End: 2019-07-08 | Stop reason: HOSPADM

## 2019-07-08 RX ORDER — FENTANYL CITRATE 50 UG/ML
50 INJECTION, SOLUTION INTRAMUSCULAR; INTRAVENOUS
Status: DISCONTINUED | OUTPATIENT
Start: 2019-07-08 | End: 2019-07-08 | Stop reason: HOSPADM

## 2019-07-08 RX ORDER — OXYCODONE AND ACETAMINOPHEN 7.5; 325 MG/1; MG/1
1 TABLET ORAL ONCE AS NEEDED
Status: DISCONTINUED | OUTPATIENT
Start: 2019-07-08 | End: 2019-07-08 | Stop reason: HOSPADM

## 2019-07-08 RX ORDER — ACETAMINOPHEN 500 MG
1000 TABLET ORAL EVERY 8 HOURS
Qty: 30 TABLET | Refills: 0 | Status: SHIPPED | OUTPATIENT
Start: 2019-07-08 | End: 2019-07-13

## 2019-07-08 RX ORDER — ISOSULFAN BLUE 50 MG/5ML
INJECTION, SOLUTION SUBCUTANEOUS
Status: DISCONTINUED
Start: 2019-07-08 | End: 2019-07-08 | Stop reason: HOSPADM

## 2019-07-08 RX ORDER — HYDROMORPHONE HYDROCHLORIDE 1 MG/ML
0.5 INJECTION, SOLUTION INTRAMUSCULAR; INTRAVENOUS; SUBCUTANEOUS
Status: DISCONTINUED | OUTPATIENT
Start: 2019-07-08 | End: 2019-07-08 | Stop reason: HOSPADM

## 2019-07-08 RX ORDER — ONDANSETRON 2 MG/ML
4 INJECTION INTRAMUSCULAR; INTRAVENOUS ONCE AS NEEDED
Status: DISCONTINUED | OUTPATIENT
Start: 2019-07-08 | End: 2019-07-08 | Stop reason: HOSPADM

## 2019-07-08 RX ORDER — BUPIVACAINE HYDROCHLORIDE AND EPINEPHRINE 2.5; 5 MG/ML; UG/ML
INJECTION, SOLUTION EPIDURAL; INFILTRATION; INTRACAUDAL; PERINEURAL AS NEEDED
Status: DISCONTINUED | OUTPATIENT
Start: 2019-07-08 | End: 2019-07-08 | Stop reason: HOSPADM

## 2019-07-08 RX ORDER — PROMETHAZINE HYDROCHLORIDE 25 MG/ML
6.25 INJECTION, SOLUTION INTRAMUSCULAR; INTRAVENOUS
Status: DISCONTINUED | OUTPATIENT
Start: 2019-07-08 | End: 2019-07-08 | Stop reason: HOSPADM

## 2019-07-08 RX ORDER — ONDANSETRON 2 MG/ML
INJECTION INTRAMUSCULAR; INTRAVENOUS AS NEEDED
Status: DISCONTINUED | OUTPATIENT
Start: 2019-07-08 | End: 2019-07-08 | Stop reason: SURG

## 2019-07-08 RX ORDER — PROMETHAZINE HYDROCHLORIDE 25 MG/1
25 SUPPOSITORY RECTAL ONCE AS NEEDED
Status: DISCONTINUED | OUTPATIENT
Start: 2019-07-08 | End: 2019-07-08 | Stop reason: HOSPADM

## 2019-07-08 RX ORDER — SODIUM CHLORIDE 0.9 % (FLUSH) 0.9 %
1-10 SYRINGE (ML) INJECTION AS NEEDED
Status: DISCONTINUED | OUTPATIENT
Start: 2019-07-08 | End: 2019-07-08 | Stop reason: HOSPADM

## 2019-07-08 RX ORDER — DEXAMETHASONE SODIUM PHOSPHATE 10 MG/ML
INJECTION INTRAMUSCULAR; INTRAVENOUS AS NEEDED
Status: DISCONTINUED | OUTPATIENT
Start: 2019-07-08 | End: 2019-07-08 | Stop reason: SURG

## 2019-07-08 RX ORDER — ISOSULFAN BLUE 50 MG/5ML
INJECTION, SOLUTION SUBCUTANEOUS AS NEEDED
Status: DISCONTINUED | OUTPATIENT
Start: 2019-07-08 | End: 2019-07-08 | Stop reason: HOSPADM

## 2019-07-08 RX ORDER — PROPOFOL 10 MG/ML
VIAL (ML) INTRAVENOUS AS NEEDED
Status: DISCONTINUED | OUTPATIENT
Start: 2019-07-08 | End: 2019-07-08 | Stop reason: SURG

## 2019-07-08 RX ORDER — PROMETHAZINE HYDROCHLORIDE 25 MG/1
25 TABLET ORAL ONCE AS NEEDED
Status: DISCONTINUED | OUTPATIENT
Start: 2019-07-08 | End: 2019-07-08 | Stop reason: HOSPADM

## 2019-07-08 RX ORDER — PROMETHAZINE HYDROCHLORIDE 25 MG/ML
12.5 INJECTION, SOLUTION INTRAMUSCULAR; INTRAVENOUS ONCE AS NEEDED
Status: DISCONTINUED | OUTPATIENT
Start: 2019-07-08 | End: 2019-07-08 | Stop reason: HOSPADM

## 2019-07-08 RX ORDER — DIPHENHYDRAMINE HCL 25 MG
25 CAPSULE ORAL
Status: DISCONTINUED | OUTPATIENT
Start: 2019-07-08 | End: 2019-07-08 | Stop reason: HOSPADM

## 2019-07-08 RX ORDER — LABETALOL HYDROCHLORIDE 5 MG/ML
5 INJECTION, SOLUTION INTRAVENOUS
Status: DISCONTINUED | OUTPATIENT
Start: 2019-07-08 | End: 2019-07-08 | Stop reason: HOSPADM

## 2019-07-08 RX ORDER — LIDOCAINE HYDROCHLORIDE 20 MG/ML
INJECTION, SOLUTION INFILTRATION; PERINEURAL AS NEEDED
Status: DISCONTINUED | OUTPATIENT
Start: 2019-07-08 | End: 2019-07-08 | Stop reason: SURG

## 2019-07-08 RX ORDER — CEFAZOLIN SODIUM 2 G/100ML
2 INJECTION, SOLUTION INTRAVENOUS ONCE
Status: COMPLETED | OUTPATIENT
Start: 2019-07-08 | End: 2019-07-08

## 2019-07-08 RX ADMIN — ONDANSETRON 4 MG: 2 INJECTION INTRAMUSCULAR; INTRAVENOUS at 13:40

## 2019-07-08 RX ADMIN — HEPARIN SODIUM 5000 UNITS: 5000 INJECTION INTRAVENOUS; SUBCUTANEOUS at 13:28

## 2019-07-08 RX ADMIN — DEXAMETHASONE SODIUM PHOSPHATE 8 MG: 10 INJECTION INTRAMUSCULAR; INTRAVENOUS at 13:55

## 2019-07-08 RX ADMIN — GLYCOPYRROLATE 0.2 MG: 0.2 INJECTION INTRAMUSCULAR; INTRAVENOUS at 14:07

## 2019-07-08 RX ADMIN — FAMOTIDINE 20 MG: 10 INJECTION INTRAVENOUS at 13:30

## 2019-07-08 RX ADMIN — PROPOFOL 200 MG: 10 INJECTION, EMULSION INTRAVENOUS at 13:43

## 2019-07-08 RX ADMIN — FENTANYL CITRATE 50 MCG: 50 INJECTION INTRAMUSCULAR; INTRAVENOUS at 14:10

## 2019-07-08 RX ADMIN — SODIUM CHLORIDE, POTASSIUM CHLORIDE, SODIUM LACTATE AND CALCIUM CHLORIDE 9 ML/HR: 600; 310; 30; 20 INJECTION, SOLUTION INTRAVENOUS at 13:31

## 2019-07-08 RX ADMIN — TECHNETIUM TC 99M SULFUR COLLOID 1 DOSE: KIT at 12:40

## 2019-07-08 RX ADMIN — LIDOCAINE HYDROCHLORIDE 3 ML: 10 INJECTION, SOLUTION INFILTRATION; PERINEURAL at 12:40

## 2019-07-08 RX ADMIN — HYDROCODONE BITARTRATE AND ACETAMINOPHEN 1 TABLET: 7.5; 325 TABLET ORAL at 17:08

## 2019-07-08 RX ADMIN — DIAZEPAM 5 MG: 5 TABLET ORAL at 11:53

## 2019-07-08 RX ADMIN — LIDOCAINE HYDROCHLORIDE 20 MG: 20 INJECTION, SOLUTION INFILTRATION; PERINEURAL at 13:43

## 2019-07-08 RX ADMIN — GLYCOPYRROLATE 0.6 MG: 0.2 INJECTION INTRAMUSCULAR; INTRAVENOUS at 16:10

## 2019-07-08 RX ADMIN — Medication 3 MG: at 16:10

## 2019-07-08 RX ADMIN — CEFAZOLIN SODIUM 2 G: 2 INJECTION, SOLUTION INTRAVENOUS at 13:56

## 2019-07-08 RX ADMIN — ROCURONIUM BROMIDE 40 MG: 10 INJECTION, SOLUTION INTRAVENOUS at 13:43

## 2019-07-08 RX ADMIN — FENTANYL CITRATE 25 MCG: 50 INJECTION INTRAMUSCULAR; INTRAVENOUS at 14:55

## 2019-07-08 RX ADMIN — FENTANYL CITRATE 25 MCG: 50 INJECTION INTRAMUSCULAR; INTRAVENOUS at 14:43

## 2019-07-08 RX ADMIN — LIDOCAINE HYDROCHLORIDE 1 EACH: 40 SOLUTION TOPICAL at 13:45

## 2019-07-08 NOTE — ANESTHESIA PREPROCEDURE EVALUATION
Anesthesia Evaluation     Patient summary reviewed and Nursing notes reviewed   NPO Solid Status: > 8 hours  NPO Liquid Status: > 2 hours           Airway   Mallampati: II  TM distance: >3 FB  Neck ROM: full  Dental - normal exam     Pulmonary - negative pulmonary ROS and normal exam   Cardiovascular - normal exam    ECG reviewed    (+) hypertension,       Neuro/Psych  (+) dizziness/light headedness, numbness,     GI/Hepatic/Renal/Endo    (+)   hyperthyroidism    Musculoskeletal     Abdominal    Substance History - negative use     OB/GYN negative ob/gyn ROS         Other   (+) arthritis                   Anesthesia Plan    ASA 2     general     Anesthetic plan, all risks, benefits, and alternatives have been provided, discussed and informed consent has been obtained with: patient.

## 2019-07-08 NOTE — ANESTHESIA POSTPROCEDURE EVALUATION
Patient: Emily Azevedo    Procedure Summary     Date:  07/08/19 Room / Location:   [facility]  [facility]    Anesthesia Start:  1335 Anesthesia Stop:  1630    Procedure:  BREAST LUMPECTOMY WITH SENTINEL NODE BIOPSY AND NEEDLE LOCALIZATION (Right Breast) Diagnosis:       Malignant neoplasm of lower-inner quadrant of right breast of female, estrogen receptor positive (CMS/HCC)      (Malignant neoplasm of lower-inner quadrant of right breast of female, estrogen receptor positive (CMS/HCC) [C50.311, Z17.0])    Surgeon:  Evelin Ayala MD Provider:  Jesús Camacho MD    Anesthesia Type:  general ASA Status:  2          Anesthesia Type: general  Last vitals  BP   141/82 (07/08/19 1718)   Temp   36.6 °C (97.9 °F) (07/08/19 1712)   Pulse   77 (07/08/19 1718)   Resp   16 (07/08/19 1718)     SpO2   97 % (07/08/19 1718)     Post Anesthesia Care and Evaluation    Patient location during evaluation: bedside  Patient participation: complete - patient participated  Level of consciousness: awake and alert  Pain management: adequate  Airway patency: patent  Anesthetic complications: No anesthetic complications  PONV Status: none  Cardiovascular status: acceptable  Respiratory status: acceptable  Hydration status: acceptable    Comments: /82 (Patient Position: Sitting)   Pulse 77   Temp 36.6 °C (97.9 °F) (Temporal)   Resp 16   Wt 96.3 kg (212 lb 4.9 oz)   LMP  (LMP Unknown)   SpO2 97%   BMI 38.83 kg/m²

## 2019-07-08 NOTE — ANESTHESIA PROCEDURE NOTES
Airway  Urgency: elective    Airway not difficult    General Information and Staff    Patient location during procedure: OR  Anesthesiologist: Oh Rod MD  CRNA: Yaneli Zurita CRNA    Indications and Patient Condition  Indications for airway management: airway protection    Preoxygenated: yes  Mask difficulty assessment: 1 - vent by mask    Final Airway Details  Final airway type: endotracheal airway      Successful airway: ETT  Cuffed: yes   Successful intubation technique: direct laryngoscopy  Facilitating devices/methods: intubating stylet  Endotracheal tube insertion site: oral  Blade: Perez  Blade size: 2  ETT size (mm): 7.0  Cormack-Lehane Classification: grade I - full view of glottis  Placement verified by: chest auscultation and capnometry   Cuff volume (mL): 7  Measured from: teeth  ETT to teeth (cm): 19  Number of attempts at approach: 1

## 2019-07-08 NOTE — OP NOTE
Operative Report    Patient Name:  Emily Azevedo  YOB: 1960    Date of Surgery:  7/8/2019     Pre-op Diagnosis:   Malignant neoplasm of lower-inner quadrant of right breast of female, estrogen receptor positive (CMS/HCC) [C50.311, Z17.0]       Post-Op Diagnosis:  Malignant neoplasm of lower-inner quadrant of right breast of female, estrogen receptor positive (CMS/HCC) [C50.311, Z17.0]    Procedures:  Right needle-localized partial mastectomy and sentinel lymph node biopsy    Staff:  Surgeon(s):  Evelin Ayala MD      Anesthesia: General    Estimated Blood Loss: 5 mL    Implants:    Nothing was implanted during the procedure    Specimen:          Specimens     ID Source Type Tests Collected By Collected At Frozen?      A Breast, Right Tissue · TISSUE PATHOLOGY EXAM   Evelin Ayala MD 7/8/19 1446      Description: right axilla, sentinel node #1 hot and blue, count 277    B Breast, Right Tissue · TISSUE PATHOLOGY EXAM   Evelin Ayala MD 7/8/19 1453      Description: right axilla, sentinel node #2, hot and blue, count 3800    C Breast, Right Tissue · TISSUE PATHOLOGY EXAM   Evelin Ayala MD 7/8/19 1505      Description: right axilla, sentinel node #3, hot not blue, count 1100    D Breast, Right Tissue · TISSUE PATHOLOGY EXAM   Evelin Ayala MD 7/8/19 1535      Description: RIGHT PARTIAL MASTECTOMY-INK HOANG MARGIN    E Breast, Right Tissue · TISSUE PATHOLOGY EXAM   Evelin Ayala MD 7/8/19 1547      Description: ADDITIONAL LATERAL MARGIN            Findings: Clip and postbiopsy hematoma were located on the lateral side of the specimen radiograph, so an additional lateral margin was taken.    Complications: None    Indications: The patient is a 59 y.o. F with a new diagnosis of right breast cancer: low grade, invasive ductal carcinoma, ER/IL+, Her2 negative, with associated DCIS; clinical T1aN0, anatomic stage IA, prognostic stage IA. She is a good candidate for  lumpectomy and she desires breast conservation. The lesion required pre-operative wire-localization. The risks and benefits were discussed preoperatively and she understood and agreed to proceed.     Description of Procedure:   Preoperatively the patient was taken to the radiology department and the lesion was localized with a needle/wire. The patient then proceeded to the nuclear medicine suite and the periareolar skin was injected with Tc-99 sulfur colloid. I reviewed the post-localization mammogram to determine the trajectory of the wire. The patient was identified in the pre-operative area and brought to the operating room and placed supine on the table. Patient verification was performed and general anesthesia was induced. The gamma probe was applied to the right axilla and uptake was verified. The right breast was cleansed with an alcohol prep and 5 ml of lymphazurin blue was injected in the retroareolar area. She tolerated this well. The patient was prepped and draped in sterile fashion with the right arm prepped into the field and the wire/needle prepped into the field. A time out was performed and all were in agreement. I confirmed that the patient had received pre-operative antibiotics and chemical prophylaxis.     The navigator gamma probe was used to find the area of increased uptake in the axilla. The skin was infiltrated with local anesthetic. A small incision was made below the hairbearing region of the axilla. Dissection was taken down through the fat and subcutaneous tissue until the clavipectoral fascia was reached. The fascia was incised and the axilla entered. A blue channel was encountered. This was followed down to a hot/blue node, which was carefully excised, taking care to clip all lymphatic channels. Ex vivo counts were 277. A second hot/blue node was encountered, and was excised in its entirety, clipping all lymphatic channels. Ex vivo counts on SLN #2 measured 3800. A third hot/not blue  node was encountered and excised in its entirety, clipping all lymphatic channels. Ex vivo counts were 1100. Background count was 15, there were no additional hot or blue nodes, and the node biopsy was terminated. The wound was irrigated & packed with a damp sponge.     Attention was then turned to the right breast. Intraoperative ultrasound was used to identify the needle & wire. On the skin, I marked the suggested location of the lesion based on both the ultrasound and mammographic localization imaging. I used this to plan my intended area of resection and incision, and marked these as well. The skin was infiltrated with local anesthesia. A lower inner quadrant radial incision was made with a scalpel, and carried down through the dermis with sharp dissection. Flaps were raised according to the planned dissection. An anterior flap was raised first. Dissection was then carried out superiorly, inferiorly, medially, and laterally according to the planned area of resection. The wire was delivered into the wound. The posterior dissection was completed and the specimen removed. The specimen was oriented with paint (red - superior, green - anterior, blue - inferior, yellow - medial, orange - lateral, black - posterior). Specimen radiograph showed the clip and associated postbiopsy hematoma located on the lateral side of the lumpectomy. Therefore, an additional lateral margin was taken and inked with the corresponding colors.    The breast cavity was irrigated and hemostasis achieved. The sponge was removed from the axillary wound and it was checked for hemostasis. The remaining local anesthesia was infiltrated into the breast cavity & the subcutaneous axillary tissue. Marking clips were placed in the breast cavity. In the axilla, the clavipectoral fascia was reapproximated with interrupted 3-0 vicryl suture. The deep dermis of both wounds was then closed with interrupted 3-0 vicryl suture. The skin of both wounds was  closed with 4-0 subcuticular running monocryl and covered with dermabond. Counts were correct at the end of the case. The patient was awakened from anesthesia and taken to the PACU in stable condition.    Evelin Ayala MD     Date: 7/8/2019  Time: 4:39 PM

## 2019-07-10 LAB
CYTO UR: NORMAL
LAB AP CASE REPORT: NORMAL
LAB AP DIAGNOSIS COMMENT: NORMAL
LAB AP SYNOPTIC CHECKLIST: NORMAL
PATH REPORT.FINAL DX SPEC: NORMAL
PATH REPORT.GROSS SPEC: NORMAL

## 2019-07-12 ENCOUNTER — TELEPHONE (OUTPATIENT)
Dept: SURGERY | Facility: CLINIC | Age: 59
End: 2019-07-12

## 2019-07-12 DIAGNOSIS — C50.311 MALIGNANT NEOPLASM OF LOWER-INNER QUADRANT OF RIGHT BREAST OF FEMALE, ESTROGEN RECEPTOR POSITIVE (HCC): Primary | ICD-10-CM

## 2019-07-12 DIAGNOSIS — Z17.0 MALIGNANT NEOPLASM OF LOWER-INNER QUADRANT OF RIGHT BREAST OF FEMALE, ESTROGEN RECEPTOR POSITIVE (HCC): Primary | ICD-10-CM

## 2019-07-12 NOTE — TELEPHONE ENCOUNTER
I called Ms. Azevedo to discuss her pathology report. She is recovering well from surgery. I will place referrals for med/onc and rad/onc.     Evelin Ayala MD

## 2019-07-23 ENCOUNTER — OFFICE VISIT (OUTPATIENT)
Dept: SURGERY | Facility: CLINIC | Age: 59
End: 2019-07-23

## 2019-07-23 VITALS
WEIGHT: 213 LBS | DIASTOLIC BLOOD PRESSURE: 76 MMHG | BODY MASS INDEX: 39.2 KG/M2 | SYSTOLIC BLOOD PRESSURE: 136 MMHG | HEART RATE: 77 BPM | OXYGEN SATURATION: 97 % | HEIGHT: 62 IN

## 2019-07-23 DIAGNOSIS — C50.311 MALIGNANT NEOPLASM OF LOWER-INNER QUADRANT OF RIGHT BREAST OF FEMALE, ESTROGEN RECEPTOR POSITIVE (HCC): ICD-10-CM

## 2019-07-23 DIAGNOSIS — Z12.39 BREAST SCREENING: ICD-10-CM

## 2019-07-23 DIAGNOSIS — Z17.0 MALIGNANT NEOPLASM OF LOWER-INNER QUADRANT OF RIGHT BREAST OF FEMALE, ESTROGEN RECEPTOR POSITIVE (HCC): ICD-10-CM

## 2019-07-23 DIAGNOSIS — Z48.89 POSTOPERATIVE VISIT: Primary | ICD-10-CM

## 2019-07-23 PROCEDURE — 99024 POSTOP FOLLOW-UP VISIT: CPT | Performed by: SURGERY

## 2019-07-23 NOTE — PROGRESS NOTES
[facility]     Referring Provider: George Strange MD     Chief complaint: Postoperative visit     HPI:   6/24/19:  Ms. Emily Azevedo is a 58 yo woman, seen at the request of Dr. George Strange, for a new diagnosis of right breast cancer. This was initially detected as an imaging abnormality. Her work-up is detailed in the oncologic history below. She denies any  breast lumps, pain, skin changes, or nipple discharge. She has a past history of a benign right breast biopsy in 2006. She denies any family history of breast or ovarian cancer.     7/23/19, Interval History:   She underwent right partial mastectomy & SLNB on 7/8/19. See surgery & pathology details below in oncologic history. She has been recovering well and has no complaints. She was by herself in clinic today.        Oncology/Hematology History    9/19/17, Screening MMG with Daniel ([facility]):  BI-RADS 2: Benign.     10/17/18, [facility]  There are scattered areas of fibroglandular density. Previous percutaneous biopsy clip marker in the upper central right breast. There is a new, irregular nodular opacity in the deep medial right breast along the 3:00 axis near the chest wall measuring just over 1 cm in size, newly visible since prior studies. There is a new circumscribed nodule in the medial right breast about 4 cm from the nipple that is enlarged since the prior exam. This may represent a cyst.                                 BI-RADS 0: Incomplete.    11/07/18, Right Diagnostic MMG & Right Breast US ([facility]):  MMG:  Small focal ill-defined opacity is again noted within the deep medial right breast along the 3:00 axis. This is visible in retrospect on each of the prior studies, and may therefore potentially represent focally prominent normal fibroglandular tissue.   US:  Despite prolonged imaging, no focal lesion could be identified within this portion of the medial right breast. Two benign cysts in the medial  right breast near the nipple measuring 0.9 cm and 0.6 cm, corresponding to benign-appearing nodular opacities visible on mammogram today.        BI-RADS 3: Probably benign. Follow-up MMG in six months.        Malignant neoplasm of lower-inner quadrant of right breast of female, estrogen receptor positive (CMS/HCC)    6/2/2019 Initial Diagnosis     Malignant neoplasm of lower-inner quadrant of right breast of female, estrogen receptor positive (CMS/HCC)         6/3/2019 Imaging     Right Diagnostic MMG & Right Breast US ( [facility]):    MMG:  Small focal irregular opacity is again noted along the 3:00 axis in the deep portion of the right breast about 9 cm from the nipple measuring about 8 mm. Linear opacities surround the lesion in a radial pattern. The morphology is concerning, particularly on tomogram images today.   US:  High-resolution grayscale ultrasound imaging directed to this portion of the breast today does show a subtle focus of acoustic shadowing, although mass is not clearly delineated.   BI-RADS 4: Suspicious.         6/13/2019 Biopsy     Right breast, Stereotactic biopsy ( [facility]):    1. Breast, Right Deep Medial 3 O'clock Position, Core Biopsy: Breast parenchyma with               A.  Minimally represented invasive carcinoma of no special type (ductal) measuring 2.8 mm maximally                     on the slide, Meghan histologic grade I (tubules = 1, nuclei = 1, mitoses = 1), with associated ductal                     carcinoma in situ (DCIS) with cribriform and micropapillary architecture and low nuclear grade.    ER+ (%, strong)  KS+ (41-50%, moderate)  HER2 negative (IHC 1+)   Ki-67 1%         6/20/2019 Imaging     Bilateral Breast MRI ( [facility]):  The mammogram showed a very tiny cluster of microcalcifications in the lower inner quadrant of the right breast that were biopsied under stereotactic guidance. The biopsy marking clip is identified in the lower inner quadrant at approximately the  4:00 location. It is contained within the inferior aspect of a tubular shaped biopsy tract containing a small amount of subacute hemorrhage. This measures 9 mm in width and 4.7 cm in length. There is only a thin rind of low-level enhancement surrounding the biopsy clip and the tract that is consistent with enhancement due to postoperative change. No suspicious above threshold enhancement is identified. There is no MRI evidence of significant residual neoplasm. The patient certainly appears to be a candidate for breast conservation. No discrete mass is identified. Incidental note is made of an 8 mm diameter cyst in the anterior aspect of the right breast at the 3:00 location. There are a few scattered benign-appearing stippled areas of enhancement within the left breast. A small briskly enhancing lymph node is also noted at the 3:00 location within the middle third of the left breast. There is no axillary or internal mammary lymphadenopathy. The chest wall is unremarkable.   BI-RADS 6: Known malignancy.         7/8/2019 Surgery     Right needle-localized partial mastectomy and sentinel lymph node biopsy        1.  Right Breast Needle Localization (44 grams):                A.  RESIDUAL FOCUS OF DUCTAL CARCINOMA IN SITU, 7 MM, CRIBRIFORM TYPE (LOW NUCLEAR                    GRADE) ADJACENT TO THE PREVIOUS BIOPSY SITE.                B.  Negative for residual invasive neoplasm.                C.  All surgical margins of excision are negative for DCIS (closest margin anterior, 1.1 mm).                D.  For receptor studies, see previous pathology report (ER 95%, WV 45%, HER2-Gloria negative, Ki-67 1%).      2.  Right [place] Lymph Node #1:                A.  Single benign lymph node.               B.  Multiple stepped sections are negative for tumor.                 3.  Right [place] Lymph Node #2:                A.  Single benign lymph node.               B.  Multiple stepped sections are negative for tumor.     4.   Right [place] Lymph Node #3:                A.  Single benign lymph node.               B.  Multiple stepped sections are negative for tumor.     5.  Right Breast, Additional Lateral Margin:                A.  Benign breast parenchyma.                B.  Negative for neoplasm.              Review of Systems:  See interval history.       Medications:    Current Outpatient Medications:   •  celecoxib (CeleBREX) 200 MG capsule, Take 200 mg by mouth Daily. PATIENT TO CONTACT DR. TRA NAILS REGARDING HOLDING PRIOR TO SURGERY, Disp: , Rfl:   •  cetirizine (ZyrTEC) 10 MG tablet, Take 10 mg by mouth daily., Disp: , Rfl:   •  diclofenac (VOLTAREN) 1 % gel gel, Apply 4 g topically to the appropriate area as directed 4 (Four) Times a Day As Needed. HOLD PRIOR TO SURGERY, Disp: , Rfl:   •  DULoxetine (CYMBALTA) 60 MG capsule, Take 60 mg by mouth daily. LAST DOSE 7/17/16, Disp: , Rfl:   •  levothyroxine (SYNTHROID, LEVOTHROID) 100 MCG tablet, Take 100 mcg by mouth daily., Disp: , Rfl:   •  losartan-hydrochlorothiazide (HYZAAR) 100-12.5 MG per tablet, Take 1 tablet by mouth Every Morning., Disp: , Rfl:   •  SAXENDA 18 MG/3ML solution pen-injector, , Disp: , Rfl:       Allergies:  No Known Allergies      Family History   Problem Relation Age of Onset   • Lymphoma Paternal Uncle 30        non hodgkins   • Lymphoma Nephew 16        non hodgkins   • Breast cancer Neg Hx    • Malig Hyperthermia Neg Hx        PHYSICAL EXAMINATION:   Vitals:    07/23/19 1401   BP: 136/76   Pulse: 77   SpO2: 97%     ECOG 0 - Asymptomatic  General: NAD, well appearing  Psych: a&o x 3, normal mood and affect  Eyes: EOMI, no scleral icterus  ENMT: neck supple without masses or thyromegaly, mucus membranes moist  MSK: normal gait, normal ROM in bilateral shoulders  Lymph nodes: Well-healing right axillary incision with dermabond intact. Minimal fullness at the surgical site.  Breast: symmetric, large size, grade 3 ptosis  Right: Well-healing LIQ  radial incision with dermabond intact. Minimal fullness at the surgical site.  Left: deferred.      Assessment:  59 y.o. F with diagnosis of right breast cancer: low grade, invasive ductal carcinoma, ER/DE+, Her2 negative, with associated DCIS. She is sp right partial mastectomy & SLNB on 7/8/19, pT1aN0, anatomic stage IA, prognostic stage IA.      Plan:  -Medical oncology referral. Appt with Dr. Morales scheduled on 7/30/19.  -Radiation oncology referral. Appt with Dr. Blake scheduled on 7/29/19  -PT/LE clinic evaluation in 2 weeks.  -F/u in 10/2019 with Left screening MMG.  -She was instructed to call sooner with any questions, concerns or changes on BSE.    Evelin Ayala MD      CC:  MD Gopal Prince MD

## 2019-07-29 ENCOUNTER — CONSULT (OUTPATIENT)
Dept: RADIATION ONCOLOGY | Facility: HOSPITAL | Age: 59
End: 2019-07-29

## 2019-07-29 ENCOUNTER — APPOINTMENT (OUTPATIENT)
Dept: RADIATION ONCOLOGY | Facility: HOSPITAL | Age: 59
End: 2019-07-29

## 2019-07-29 VITALS
OXYGEN SATURATION: 95 % | WEIGHT: 212 LBS | HEART RATE: 78 BPM | SYSTOLIC BLOOD PRESSURE: 119 MMHG | BODY MASS INDEX: 38.78 KG/M2 | DIASTOLIC BLOOD PRESSURE: 73 MMHG

## 2019-07-29 DIAGNOSIS — C50.311 MALIGNANT NEOPLASM OF LOWER-INNER QUADRANT OF RIGHT BREAST OF FEMALE, ESTROGEN RECEPTOR POSITIVE (HCC): Primary | ICD-10-CM

## 2019-07-29 DIAGNOSIS — Z17.0 MALIGNANT NEOPLASM OF LOWER-INNER QUADRANT OF RIGHT BREAST OF FEMALE, ESTROGEN RECEPTOR POSITIVE (HCC): Primary | ICD-10-CM

## 2019-07-29 PROCEDURE — 99245 OFF/OP CONSLTJ NEW/EST HI 55: CPT | Performed by: RADIOLOGY

## 2019-07-29 PROCEDURE — 77290 THER RAD SIMULAJ FIELD CPLX: CPT | Performed by: RADIOLOGY

## 2019-07-29 PROCEDURE — 77333 RADIATION TREATMENT AID(S): CPT | Performed by: RADIOLOGY

## 2019-07-29 PROCEDURE — 77263 THER RADIOLOGY TX PLNG CPLX: CPT | Performed by: RADIOLOGY

## 2019-07-29 PROCEDURE — G0463 HOSPITAL OUTPT CLINIC VISIT: HCPCS | Performed by: RADIOLOGY

## 2019-07-29 NOTE — PROGRESS NOTES
Subjective     Evelin Ayala MD     Diagnosis Plan   1. Malignant neoplasm of lower-inner quadrant of right breast of female, estrogen receptor positive (CMS/HCC)       CC: T1aN0 invasive ductal carcinoma of right breast with associated DCIS, ER IN pos Her 2 negative                              Dear Evelin Ayala MD    I had the pleasure of seeing Emily Azevedo  today in the [facility].    The patient is a 59 y.o. year old female with recently diagnosed right breast cancer.  She had a diagnostic right breast mammogram on 18 which showed a focal opacity in medial right breast 3 oclock and ultrasound showed no focal lesion, birads 3 follow up in 6 months recommended.  She had a repeat right diagnostic mammogram on 6/3/19 which showed again a focal opacity in medial right breast 3 oclock position measuring 8mm with linear opacities surrounding the lesion in radial pattern.  Ultrasound showed a subtle focus of acoustic shadowing but no clear mass, however, a stereotactic biopsy was recommended.  She had a stereotactic biopsy on 19 which revealed invasive ductal carcinoma, 2.8mm, grade I, with associated dcis low grade, ER % IN 41-50%, Her 2 negative and ki 67 1%.      She had a breast MRI on 19 which showed a clip and post biopsy changes in lower inner quadrant right breast with no evidence of residual neoplasm and no suspicious findings in left breast. She underwent a right breast lumpectomy and sentinel node biopsy on 19 with pathology revealing a 7mm focus of residual dcis, cribriform type, low grade adjacent to previous biopsy site with the closest anterior margin 1.1mm and three negative sentinel nodes.  Her final pathologic stage was pT1aN0 based on the original biopsy.      She is  with menarche age 9 and first delivery age 19, menopause age 53 and then kasi/bso in 2016.   She has never taken hormone replacement therapy and did not breast feed.  She has no  family hx of breast or ovarian cancer.        Review of Systems   Constitutional: Negative.    HENT: Positive for hearing loss and tinnitus.    Eyes: Negative.    Respiratory: Negative.    Cardiovascular: Negative.    Gastrointestinal: Negative.    Genitourinary: Negative.    Musculoskeletal: Positive for arthralgias.   Skin: Negative.    Neurological: Positive for light-headedness.   Psychiatric/Behavioral: Negative.          Past Medical History:   Diagnosis Date   • Arthritis    • Carpal tunnel syndrome    • Disease of thyroid gland     Hypothyroidism   • Graves disease    • Hearing loss     earing aids    • History of urinary tract infection    • Hypertension    • Malignant neoplasm of lower-inner quadrant of right breast of female, estrogen receptor positive (CMS/HCC) 6/24/2019   • Palpitations     Dr. Cheng PMD Benign    • Seasonal allergies    • Vertigo          Past Surgical History:   Procedure Laterality Date   • BREAST BIOPSY Right     MALIGNANT   • BREAST BIOPSY Right 2013    BENIGN   • BREAST LUMPECTOMY Right 7/8/2019    Procedure: BREAST LUMPECTOMY WITH SENTINEL NODE BIOPSY AND NEEDLE LOCALIZATION;  Surgeon: Evelin Ayala MD;  Location: [facility];  Service: General   • CARPAL TUNNEL RELEASE Right    • CERVICAL CONE BIOPSY     • COLONOSCOPY     • GALLBLADDER SURGERY     • LAPAROSCOPIC TUBAL LIGATION     • OOPHORECTOMY     • MD TOTAL ABDOM HYSTERECTOMY Bilateral 7/28/2016    Procedure: TOTAL ABDOMINAL HYSTERECTOMY W/ BSO ;  Surgeon: George Strange MD;  Location: [facility];  Service: Obstetrics/Gynecology   • ROTATOR CUFF REPAIR Right    • TONSILLECTOMY           Social History     Socioeconomic History   • Marital status:      Spouse name: Mendez   • Number of children: 2   • Years of education: Not on file   • Highest education level: Not on file   Occupational History   • Occupation: Retired     Employer: RETIRED   Tobacco Use   • Smoking status: Never Smoker   •  Smokeless tobacco: Never Used   Substance and Sexual Activity   • Alcohol use: No   • Drug use: No   • Sexual activity: Defer   Social History Narrative    2 sons         Family History   Problem Relation Age of Onset   • Lymphoma Paternal Uncle 30        non hodgkins   • Lymphoma Nephew 16        non hodgkins   • Breast cancer Neg Hx    • Malig Hyperthermia Neg Hx           Objective    Physical Exam   Constitutional: She is oriented to person, place, and time. She appears well-developed and well-nourished.   HENT:   Head: Normocephalic and atraumatic.   Eyes: EOM are normal.   Neck: Neck supple.   Pulmonary/Chest:   Right breast slightly smaller than left well healed incisions, no palpable masses in either breast,        Abdominal: Soft.   Musculoskeletal: Normal range of motion.   Neurological: She is alert and oriented to person, place, and time.   Skin: Skin is warm.   Psychiatric: She has a normal mood and affect. Her behavior is normal. Judgment and thought content normal.         Current Outpatient Medications on File Prior to Visit   Medication Sig Dispense Refill   • celecoxib (CeleBREX) 200 MG capsule Take 200 mg by mouth Daily. PATIENT TO CONTACT DR. TRA NAILS REGARDING HOLDING PRIOR TO SURGERY     • cetirizine (ZyrTEC) 10 MG tablet Take 10 mg by mouth daily.     • diclofenac (VOLTAREN) 1 % gel gel Apply 4 g topically to the appropriate area as directed 4 (Four) Times a Day As Needed. HOLD PRIOR TO SURGERY     • DULoxetine (CYMBALTA) 60 MG capsule Take 60 mg by mouth daily. LAST DOSE 7/17/16     • levothyroxine (SYNTHROID, LEVOTHROID) 100 MCG tablet Take 100 mcg by mouth daily.     • losartan-hydrochlorothiazide (HYZAAR) 100-12.5 MG per tablet Take 1 tablet by mouth Every Morning.     • SAXENDA 18 MG/3ML solution pen-injector        No current facility-administered medications on file prior to visit.        ALLERGIES:  No Known Allergies    LMP  (LMP Unknown)      No flowsheet data  found.      Assessment/Plan     59 year old female with T1aN0 invasive ductal carcinoma of right breast with associated DCIS, ER HI pos Her 2 negative.  I have reviewed her records and imaging.  I discussed with her my recommendation for post-operative radiation therapy to the right breast to decrease the risk of local recurrence.      I discussed with her in detail the risks, benefits and rationale of radiation therapy to the right breast to include but not limited to the following:    Acute: skin erythema, breakdown/moist desquamation, swelling or discomfort of the breast, fatigue, pneumonitis resulting in shortness of breath, cough or pain    Late: Permanent skin changes including hyperpigmentation, telangiectasias, fibrosis of the breast resulting in smaller size or poor cosmetic outcome, late edema or cellulitis, late rib fracture, late pulmonary fibrosis and the remote risk of second malignancies.      She and her  voiced understanding and were given an opportunity to ask questions which I believe were answered to their satisfaction.  We will proceed with CT simulation for treatment planning today in anticipation of starting her treatments next week. I plan to treat the right breast with tangential fields to a dose of approximately 4256cGy in 16 fractions followed by a tumor bed boost for an additional 1000cGy in 5 fractions.     I spent greater than 60 minutes in face-to-face time with the patient and 45 minutes of that time were spent in counseling and coordination of care, including review of imaging and pathology; indications, goals, logistics, alternatives and risks - both common and rare - for my recommendations as well as surveillance and potential outcomes.         Thank you very much for allowing me to participate in the care of this very pleasant patient.    Sincerely,      Maddie Blake MD

## 2019-07-30 ENCOUNTER — CLINICAL SUPPORT (OUTPATIENT)
Dept: ONCOLOGY | Facility: CLINIC | Age: 59
End: 2019-07-30

## 2019-07-30 ENCOUNTER — APPOINTMENT (OUTPATIENT)
Dept: LAB | Facility: HOSPITAL | Age: 59
End: 2019-07-30

## 2019-07-30 ENCOUNTER — CONSULT (OUTPATIENT)
Dept: ONCOLOGY | Facility: CLINIC | Age: 59
End: 2019-07-30

## 2019-07-30 VITALS
OXYGEN SATURATION: 95 % | HEART RATE: 76 BPM | TEMPERATURE: 98.2 F | HEIGHT: 62 IN | BODY MASS INDEX: 38.92 KG/M2 | RESPIRATION RATE: 16 BRPM | DIASTOLIC BLOOD PRESSURE: 75 MMHG | WEIGHT: 211.5 LBS | SYSTOLIC BLOOD PRESSURE: 127 MMHG

## 2019-07-30 DIAGNOSIS — Z17.0 MALIGNANT NEOPLASM OF LOWER-INNER QUADRANT OF RIGHT BREAST OF FEMALE, ESTROGEN RECEPTOR POSITIVE (HCC): ICD-10-CM

## 2019-07-30 DIAGNOSIS — C50.919 MALIGNANT NEOPLASM OF FEMALE BREAST, UNSPECIFIED ESTROGEN RECEPTOR STATUS, UNSPECIFIED LATERALITY, UNSPECIFIED SITE OF BREAST (HCC): Primary | ICD-10-CM

## 2019-07-30 DIAGNOSIS — C50.311 MALIGNANT NEOPLASM OF LOWER-INNER QUADRANT OF RIGHT BREAST OF FEMALE, ESTROGEN RECEPTOR POSITIVE (HCC): Primary | ICD-10-CM

## 2019-07-30 DIAGNOSIS — Z17.0 MALIGNANT NEOPLASM OF LOWER-INNER QUADRANT OF RIGHT BREAST OF FEMALE, ESTROGEN RECEPTOR POSITIVE (HCC): Primary | ICD-10-CM

## 2019-07-30 DIAGNOSIS — C50.311 MALIGNANT NEOPLASM OF LOWER-INNER QUADRANT OF RIGHT BREAST OF FEMALE, ESTROGEN RECEPTOR POSITIVE (HCC): ICD-10-CM

## 2019-07-30 LAB
BASOPHILS # BLD AUTO: 0.04 10*3/MM3 (ref 0–0.2)
BASOPHILS NFR BLD AUTO: 0.6 % (ref 0–1.5)
DEPRECATED RDW RBC AUTO: 43.6 FL (ref 37–54)
EOSINOPHIL # BLD AUTO: 0.27 10*3/MM3 (ref 0–0.4)
EOSINOPHIL NFR BLD AUTO: 3.8 % (ref 0.3–6.2)
ERYTHROCYTE [DISTWIDTH] IN BLOOD BY AUTOMATED COUNT: 13 % (ref 12.3–15.4)
HCT VFR BLD AUTO: 45.5 % (ref 34–46.6)
HGB BLD-MCNC: 15.6 G/DL (ref 12–15.9)
IMM GRANULOCYTES # BLD AUTO: 0.06 10*3/MM3 (ref 0–0.05)
IMM GRANULOCYTES NFR BLD AUTO: 0.8 % (ref 0–0.5)
LYMPHOCYTES # BLD AUTO: 2.04 10*3/MM3 (ref 0.7–3.1)
LYMPHOCYTES NFR BLD AUTO: 28.9 % (ref 19.6–45.3)
MCH RBC QN AUTO: 31.5 PG (ref 26.6–33)
MCHC RBC AUTO-ENTMCNC: 34.3 G/DL (ref 31.5–35.7)
MCV RBC AUTO: 91.7 FL (ref 79–97)
MONOCYTES # BLD AUTO: 0.7 10*3/MM3 (ref 0.1–0.9)
MONOCYTES NFR BLD AUTO: 9.9 % (ref 5–12)
NEUTROPHILS # BLD AUTO: 3.95 10*3/MM3 (ref 1.7–7)
NEUTROPHILS NFR BLD AUTO: 56 % (ref 42.7–76)
NRBC BLD AUTO-RTO: 0 /100 WBC (ref 0–0.2)
PLATELET # BLD AUTO: 222 10*3/MM3 (ref 140–450)
PMV BLD AUTO: 10.7 FL (ref 6–12)
RBC # BLD AUTO: 4.96 10*6/MM3 (ref 3.77–5.28)
WBC NRBC COR # BLD: 7.06 10*3/MM3 (ref 3.4–10.8)

## 2019-07-30 PROCEDURE — 77334 RADIATION TREATMENT AID(S): CPT | Performed by: RADIOLOGY

## 2019-07-30 PROCEDURE — 99245 OFF/OP CONSLTJ NEW/EST HI 55: CPT | Performed by: INTERNAL MEDICINE

## 2019-07-30 PROCEDURE — 77295 3-D RADIOTHERAPY PLAN: CPT | Performed by: RADIOLOGY

## 2019-07-30 PROCEDURE — 36415 COLL VENOUS BLD VENIPUNCTURE: CPT | Performed by: INTERNAL MEDICINE

## 2019-07-30 PROCEDURE — 77300 RADIATION THERAPY DOSE PLAN: CPT | Performed by: RADIOLOGY

## 2019-07-30 PROCEDURE — 85025 COMPLETE CBC W/AUTO DIFF WBC: CPT | Performed by: INTERNAL MEDICINE

## 2019-07-30 RX ORDER — ANASTROZOLE 1 MG/1
1 TABLET ORAL DAILY
Qty: 30 TABLET | Refills: 4 | Status: SHIPPED | OUTPATIENT
Start: 2019-07-30 | End: 2019-08-29

## 2019-07-30 NOTE — PROGRESS NOTES
Subjective     REASON FOR CONSULTATION: Newly diagnosed right breast cancer  Provide an opinion on any further workup or treatment                             REQUESTING PHYSICIAN: Dr. Evelin Glover    RECORDS OBTAINED:  Records of the patients history including those obtained from the referring provider were reviewed and summarized in detail.    HISTORY OF PRESENT ILLNESS:  The patient is a 59 y.o. year old female who is here for an opinion about the above issue.    History of Present Illness patient is a 59-year-old female with history of undergoing routine mammogram.  Back in October 2019 she had a mammogram which showed an abnormality in the right breast, which showed a new irregular nodular opacity in the deep medial right breast near the chest wall measuring over 1 cm in size.  She had circumscribed benign appearing nodule in the medial right breast near the nipple which is also new.  Likely thought to be a cyst.  Diagnostic mammogram was suggested.  Patient then underwent diagnostic mammogram November 7, 2018 and was thought to represent fibroglandular tissue.  Ultrasound showed no abnormality in the medial right breast that was seen on screening mammogram.  However a six-month follow-up suggested.    Repeat diagnostic mammogram Sabra 3, 2019 was abnormal and biopsy confirmed invasive ductal carcinoma, grade 1, ER FL positive HER-2/berenice negative.  Subsequently patient underwent lumpectomy on July 8, 2019 with sentinel lymph node biopsy.    9/19/2018: Bilateral screening mammogram  IMPRESSION:  1. New irregular nodular opacity in the deep medial right breast.  Suspicious imaging features. Recall for additional diagnostic imaging is  recommended.  2. Circumscribed benign-appearing nodule in the medial right breast near  the nipple is also new. Ultrasound imaging of this nodule is recommended  to evaluate for a probable cyst or similar  benign nodule.    November 7, 2018: Right diagnostic mammogram  MAMMOGRAM FINDINGS:  Small focal ill-defined opacity is again noted within the deep medial  right breast along the 3:00 axis. This is visible in retrospect on each  of the prior studies, and may therefore potentially represent focally  prominent normal fibroglandular tissue. Ultrasound imaging was then  Performed.    November 7, 2019: Ultrasound of right breast  IMPRESSION:  1. Probably benign examination.  2. Small ill-defined opacity within the deep medial right breast showing  no corresponding abnormality on directed ultrasound imaging today.  3. Follow-up unilateral mammogram in six months is indicated.    Sabra 3, 2019: Right diagnostic mammogram and right breast ultrasound  MAMMOGRAM FINDINGS:  Small focal irregular opacity is again noted along the 3:00 axis in the  deep portion of the right breast about 9 cm from the nipple measuring  about 8 mm. Linear opacities surround the lesion in a radial pattern.  The morphology is concerning, particularly on tomogram images today.     ULTRASOUND FINDINGS:  High-resolution grayscale ultrasound imaging directed to this portion of  the breast today does show a subtle focus of acoustic shadowing,  although mass is not clearly delineated.     IMPRESSION:  1.  Small irregular lesion in the deep medial right breast with  suspicious mammographic features, difficult to identify by ultrasound.  2.  Stereotactic core needle biopsy of the lesion is recommended for  further assessment.    June 13, 2019: Stereotactic biopsy of the right breast medial 3 o'clock position lesion shows invasive carcinoma of no special type, ductal, 2.8 mm, grade 1, [place] score of 3 with associated DCIS with cribriform and micropapillary, low nuclear grade,    ER: %  HI: 41-50%  HER-2/berenice: 1+ negative  Ki-67 1%    June 21, 2019: MRI breast bilateral  IMPRESSION:  Small marking clip and postbiopsy changes in the lower  inner  quadrant of the right breast as described in more detail above. There is  no MRI evidence of residual neoplasm. There are no suspicious findings  in the left breast.    July 8, 2019: Status post right needle localized partial mastectomy with sentinel lymph node biopsy  Synoptic Checklist    INVASIVE CARCINOMA OF THE BREAST (Breast Invasive - All Specimens)  Resulting Labs  [phone]  CLINICAL  Radiologic Finding Mass or architectural distortion  [facility],  [address] [address]  PATIENT: Emily Azevedo  MRN: [id number] CSN: [id number]  Page: 2 of 4 Printed: 7/10/2019 11:35 AM  Right  .  TUMOR  Tumor Site: Invasive Carcinoma Upper inner quadrant  Clock Position of Tumor Site 3 o'clock  Histologic Type Invasive carcinoma of no special type (ductal, not otherwise  specified)  Histologic Grade ([place] Histologic Score) No residual invasive carcinoma  Tumor Size Greatest dimension of largest invasive focus in Millimeters (mm): 2.8  mm Millimeters (mm)  Tumor Focality Single focus of invasive carcinoma  Ductal Carcinoma In Situ (DCIS) Present  Negative for extensive intraductal component (EIC)  Size (Extent) of DCIS Estimated size of DCIS greatest dimension in Millimeters (mm) is at  least: 7 mm Millimeters (mm)  Number of Blocks with DCIS 2  Number of Blocks Examined 27  Architectural Patterns Cribriform  Nuclear Grade Grade I (low)  Necrosis Not identified  Lobular Carcinoma In Situ (LCIS) No LCIS in specimen  Tumor Extent  Accessory Findings  Lymphovascular Invasion Not identified  Dermal Lymphovascular Invasion No skin present  Microcalcifications Not identified  Treatment Effect No known presurgical therapy  .  MARGINS  DCIS Margins Uninvolved by DCIS  Distance of DCIS to Anterior Margin in Millimeters (mm) 1.1 Millimeters (mm)  Distance of DCIS to Posterior Margin in Millimeters (mm) 32 Millimeters (mm)  Distance of DCIS to Superior Margin in  Millimeters (mm) 5.5 Millimeters (mm)  Distance of DCIS to Inferior Margin in Millimeters (mm) 5.4 Millimeters (mm)  Distance of DCIS to Medial Margin in Millimeters (mm) 40 Millimeters (mm)  Distance of DCIS to Lateral Margin in Millimeters (mm) 21 Millimeters (mm)  Distance of DCIS from Closest Margin in Millimeters (mm) 1.1 mm Millimeters (mm)  Closest Margin Anterior  .  LYMPH NODES  Regional Lymph Nodes Uninvolved by tumor cells  Number of Lymph Nodes Examined 3  Number of [place] Nodes Examined 3  .  PATHOLOGIC STAGE CLASSIFICATION (pTNM, AJCC 8th Edition)  TNM Descriptors Not applicable  Primary Tumor (Invasive Carcinoma) (pT) pT1a  Regional Lymph Nodes (pN)  Modifier (sn): Only sentinel node(s) evaluated.  Category (pN) pN0  .  . Patient is here to discuss further options of treatment atient is here to discuss further options of treatment.  . Patient is here to discuss further options of treat  Patient is here to discuss further options of treatment.  She has a T1 a N0 ER UT positive HER-2 negative tumor in the right breast.  She has no family history of breast cancer.  Patient is already seen radiation oncology Dr. Maddie Glover who plans to start radiation next week.      Past Medical History:   Diagnosis Date   • Arthritis    • Carpal tunnel syndrome    • Disease of thyroid gland     Hypothyroidism   • Graves disease    • Hearing loss     earing aids    • History of urinary tract infection    • Hypertension    • Malignant neoplasm of lower-inner quadrant of right breast of female, estrogen receptor positive (CMS/HCC) 6/24/2019   • Palpitations     Dr. Cheng PMD Benign    • Seasonal allergies    • Vertigo         Past Surgical History:   Procedure Laterality Date   • BREAST BIOPSY Right     MALIGNANT   • BREAST BIOPSY Right 2013    BENIGN   • BREAST LUMPECTOMY Right 7/8/2019    Procedure: BREAST LUMPECTOMY WITH SENTINEL NODE BIOPSY AND NEEDLE LOCALIZATION;  Surgeon: Evelin Ayala MD;  Location:   [facility];  Service: General   • CARPAL TUNNEL RELEASE Right    • CERVICAL CONE BIOPSY     • COLONOSCOPY     • GALLBLADDER SURGERY     • LAPAROSCOPIC TUBAL LIGATION     • OOPHORECTOMY     • SC TOTAL ABDOM HYSTERECTOMY Bilateral 7/28/2016    Procedure: TOTAL ABDOMINAL HYSTERECTOMY W/ BSO ;  Surgeon: George Strange MD;  Location: [facility] MAIN OR;  Service: Obstetrics/Gynecology   • ROTATOR CUFF REPAIR Right    • TONSILLECTOMY          Current Outpatient Medications on File Prior to Visit   Medication Sig Dispense Refill   • celecoxib (CeleBREX) 200 MG capsule Take 200 mg by mouth Daily. PATIENT TO CONTACT DR. TRA NAILS REGARDING HOLDING PRIOR TO SURGERY     • cetirizine (ZyrTEC) 10 MG tablet Take 10 mg by mouth daily.     • diclofenac (VOLTAREN) 1 % gel gel Apply 4 g topically to the appropriate area as directed 4 (Four) Times a Day As Needed. HOLD PRIOR TO SURGERY     • DULoxetine (CYMBALTA) 60 MG capsule Take 60 mg by mouth daily. LAST DOSE 7/17/16     • levothyroxine (SYNTHROID, LEVOTHROID) 100 MCG tablet Take 100 mcg by mouth daily.     • losartan-hydrochlorothiazide (HYZAAR) 100-12.5 MG per tablet Take 1 tablet by mouth Every Morning.     • SAXENDA 18 MG/3ML solution pen-injector        No current facility-administered medications on file prior to visit.         ALLERGIES:  No Known Allergies     Social History     Socioeconomic History   • Marital status:      Spouse name: Mendez   • Number of children: 2   • Years of education: Not on file   • Highest education level: Not on file   Occupational History   • Occupation: Retired     Employer: RETIRED   Tobacco Use   • Smoking status: Never Smoker   • Smokeless tobacco: Never Used   Substance and Sexual Activity   • Alcohol use: No   • Drug use: No   • Sexual activity: Defer   Social History Narrative    2 sons        Family History   Problem Relation Age of Onset   • Lymphoma Paternal Uncle 30        non hodgkins   • Lymphoma Nephew 16  "       non hodgkins   • Breast cancer Neg Hx    • Malig Hyperthermia Neg Hx         Review of Systems   Constitutional: Negative for appetite change, chills, diaphoresis, fatigue, fever and unexpected weight change.   HENT: Negative for hearing loss, sore throat and trouble swallowing.    Respiratory: Negative for cough, chest tightness, shortness of breath and wheezing.    Cardiovascular: Negative for chest pain, palpitations and leg swelling.   Gastrointestinal: Negative for abdominal distention, abdominal pain, constipation, diarrhea, nausea and vomiting.   Genitourinary: Negative for dysuria, frequency, hematuria and urgency.   Musculoskeletal: Negative for joint swelling.        No muscle weakness.   Skin: Negative for rash and wound.   Neurological: Negative for seizures, syncope, speech difficulty, weakness, numbness and headaches.   Hematological: Negative for adenopathy. Does not bruise/bleed easily.   Psychiatric/Behavioral: Negative for behavioral problems, confusion and suicidal ideas.        Objective     Vitals:    07/30/19 1024   BP: 127/75   Pulse: 76   Resp: 16   Temp: 98.2 °F (36.8 °C)   TempSrc: Oral   SpO2: 95%   Weight: 95.9 kg (211 lb 8 oz)   Height: 158 cm (62.21\")   PainSc: 0-No pain     Current Status 7/30/2019   ECOG score 0       Physical Exam      GENERAL:  Well-developed, well-nourished in no acute distress.   SKIN:  Warm, dry without rashes, purpura or petechiae.  EYES:  Pupils equal, round and reactive to light.  EOMs intact.  Conjunctivae normal.  EARS:  Hearing intact.  NOSE:  Septum midline.  No excoriations or nasal discharge.  MOUTH:  Tongue is well-papillated; no stomatitis or ulcers.  Lips normal.  THROAT:  Oropharynx without lesions or exudates.  NECK:  Supple with good range of motion; no thyromegaly or masses, no JVD.  LYMPHATICS:  No cervical, supraclavicular, axillary or inguinal adenopathy.  CHEST:  Lungs clear to auscultation. Good airflow  BREAST: Right breast: Status " post right lumpectomy with sentinel lymph nodes surgery.  She is well healed up.  She does have markings of radiation.  No skin changes, no evidence of breast mass, no nipple discharge, no evidence of any right axillary adenopathy or right supraclavicular adenopathy  Left breast: No evidence of any skin changes, no evidence of any left breast mass and no evidence of left nipple discharge as well as no left axillary adenopathy or left supraclavicular adenopathy..  CARDIAC:  Regular rate and rhythm without murmurs, rubs or gallops. Normal S1,S2.  ABDOMEN:  Soft, nontender with no hepatosplenomegaly or masses.  EXTREMITIES:  No clubbing, cyanosis or edema.  NEUROLOGICAL:  Cranial Nerves II-XII grossly intact.  No focal neurological deficits.  PSYCHIATRIC:  Normal affect and mood.        RECENT LABS:  Hematology WBC   Date Value Ref Range Status   06/28/2019 7.99 3.40 - 10.80 10*3/mm3 Final   05/09/2018 7.39 4.5 - 11.0 10*3/uL Final     RBC   Date Value Ref Range Status   06/28/2019 4.62 3.77 - 5.28 10*6/mm3 Final   05/09/2018 4.86 4.0 - 5.2 10*6/uL Final     Hemoglobin   Date Value Ref Range Status   06/28/2019 14.2 12.0 - 15.9 g/dL Final   05/09/2018 15.0 12.0 - 16.0 g/dL Final     Hematocrit   Date Value Ref Range Status   06/28/2019 43.3 34.0 - 46.6 % Final   05/09/2018 45.9 36.0 - 46.0 % Final     Platelets   Date Value Ref Range Status   06/28/2019 230 140 - 450 10*3/mm3 Final   05/09/2018 243 140 - 440 10*3/uL Final      Tissue Pathology Rt Breast  Final Diagnosis   1.  Right Breast Needle Localization (44 grams):                A.  RESIDUAL FOCUS OF DUCTAL CARCINOMA IN SITU, 7 MM, CRIBRIFORM TYPE (LOW NUCLEAR                    GRADE) ADJACENT TO THE PREVIOUS BIOPSY SITE.                B.  Negative for residual invasive neoplasm.                C.  All surgical margins of excision are negative for DCIS (closest margin anterior, 1.1 mm).                D.  For receptor studies, see previous pathology report (ER  95%, MA 45%, HER2-Berenice negative, Ki-67 1%).      2.  Right [place] Lymph Node #1:                A.  Single benign lymph node.               B.  Multiple stepped sections are negative for tumor.                 3.  Right [place] Lymph Node #2:                A.  Single benign lymph node.               B.  Multiple stepped sections are negative for tumor.     4.  Right [place] Lymph Node #3:                A.  Single benign lymph node.               B.  Multiple stepped sections are negative for tumor.     5.  Right Breast, Additional Lateral Margin:                A.  Benign breast parenchyma.                B.  Negative for neoplasm.          BILATERAL BREAST MRI   IMPRESSION: 06/20/19  Small marking clip and postbiopsy changes in the lower inner  quadrant of the right breast as described in more detail above. There is  no MRI evidence of residual neoplasm. There are no suspicious findings  in the left breast.     BI-RADS Category 6-known neoplasm.    US Rt Breast 06/03/19  IMPRESSION:  1.  Small irregular lesion in the deep medial right breast with  suspicious mammographic features, difficult to identify by ultrasound.  2.  Stereotactic core needle biopsy of the lesion is recommended for  further assessment.       BI-RADS CATEGORY 4: Suspicious.    Assessment/Plan     1.  T1 a N0 M0 invasive ductal carcinoma of the right breast.  Patient is status post lumpectomy with sentinel lymph node surgery on July 2019.  She is ER positive, MA positive HER-2/berenice negative.  -September 19, 2018 screening mammogram showed new irregular nodular density in the deep medial right breast which is suspicious and six-month follow-up suggested.  -November 7, 2018, patient had right diagnostic mammogram which showed small ill-defined opacity in the deep medial right breast but ultrasound did not show that and six-month follow-up suggested  -Sabra 3, 2019, small irregular opacity in the 3:00 axis in the upper medial right breast, 8 mm,  suspicious  -Biopsy consistent with invasive ductal carcinoma, 2.8 mm, grade 1, [place] score of 3, associated DCIS, micro papillary and cribriform, ER +%, [place] +41-50%, HER-2/berenice 1+ negative, Ki-67 1%  -July 2019, status post right lumpectomy with sentinel lymph nodes, 3 o'clock position, no invasive carcinoma identified on the lumpectomy, single focus of invasive carcinoma 2.8 mm seen at biopsy, DCIS present 7 mm, DCIS in 2 of the 27 blocks, no lymphovascular space invasion, margins clear except close anterior margin from the DCIS is 1.1 mm, 3 lymph nodes negative    · Had a lengthy discussion with patient that she does not require any chemotherapy given the small size, low-grade nature of the tumor with it being ER [place] positive HER-2/berenice negative.  · She will benefit from endocrine therapy and being postmenopausal likely the best option is Arimidex.  I have discussed in detail the side effects of tamoxifen/Arimidex/Evista.  She understands the side effects and length..  · . Will plan to start Arimidex 1 mg daily.  We discussed about decreasing bone density, joint pains, cataracts, hot flashes, and a chance for hair thinning, rare chance for diarrhea, weight gain, lower extremity edema.  We will start the last 2 weeks of radiation treatment.  · Patient has seen radiation oncology already with plans to start next week.    2.  Osteopenia, reviewed bone density from December 2017 and she does have osteopenia.  We encouraged her to take calcium and vitamin D supplements.    3.  Lifestyle modification: Discussed in length with patient to exercise 40 minutes/day 5 days a week with 20 minutes of aerobics.  Also encouraged healthy diet.    4.  Referral to survivorship clinic    Plan   1.  Start Arimidex 1 mg daily  2. Take calcium 600 mg p.o. twice daily and vitamin D 1000 international units daily  3. Encourage exercise  4. Start radiation next week per radiation oncology,  5. Patient referred to survivorship  clinic  6. Will follow-up in 2 months to evaluate for toxicity check.    Thank you for allowing me to participate in the care of this patient    Reshma Morales MD

## 2019-08-01 PROCEDURE — 77321 SPECIAL TELETX PORT PLAN: CPT | Performed by: RADIOLOGY

## 2019-08-01 PROCEDURE — 77334 RADIATION TREATMENT AID(S): CPT | Performed by: RADIOLOGY

## 2019-08-05 ENCOUNTER — APPOINTMENT (OUTPATIENT)
Dept: RADIATION ONCOLOGY | Facility: HOSPITAL | Age: 59
End: 2019-08-05

## 2019-08-05 PROCEDURE — 77280 THER RAD SIMULAJ FIELD SMPL: CPT | Performed by: RADIOLOGY

## 2019-08-05 PROCEDURE — 77412 RADIATION TX DELIVERY LVL 3: CPT | Performed by: RADIOLOGY

## 2019-08-05 PROCEDURE — 77427 RADIATION TX MANAGEMENT X5: CPT | Performed by: RADIOLOGY

## 2019-08-06 PROCEDURE — 77412 RADIATION TX DELIVERY LVL 3: CPT | Performed by: RADIOLOGY

## 2019-08-06 NOTE — PROGRESS NOTES
On 7/30/19, LCSW met with the patient prior to her consultation with Dr. Morales about her breast cancer treatment. She had a lumpectomy 3 weeks ago. Her lymph nodes were negative. She starts radiation treatment next week. She hopes that Dr. ZELAYA will confirm for her today that she does not need chemotherapy, and will prescribe a hormone blocking pill for her. She said that her cancer was Stage 1, and small so she has not been worried about it.  Pt said that she had a mammogram that had a suspicious area, but the doctor wanted to wait for 6 months to re-screen her.    Pt has been  for 43 years. She has 2 children. 1 lives in the area, the other is in [place]. She has 2 grandchildren. 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 Her  has been working in a factory for 17 years, but recently started a new position there. He was unable to take off to be with her today.     Pt has [employer] friends and a neighbor who have had cancer diagnoses and treatment. Her neighbor is getting radiation now. Pt is pleasant, alert and oriented x 3. She scored a 0 on the Distress Questionnaire, and marked no disruption to her functioning. LCSW reviewed social work availability and resources. Assistance was offered as needed in the future.

## 2019-08-07 ENCOUNTER — DOCUMENTATION (OUTPATIENT)
Dept: ONCOLOGY | Facility: CLINIC | Age: 59
End: 2019-08-07

## 2019-08-07 PROCEDURE — 77412 RADIATION TX DELIVERY LVL 3: CPT | Performed by: RADIOLOGY

## 2019-08-07 NOTE — PROGRESS NOTES
OSW met with the pt as a follow-up to her social work contact with Yue De La Cruz LCSW, on 7/30/19.     The pt is receiving 21 XRT tx's at the [place] location.     We discussed the LiveStrong Program available through the [facility] in [place] ([facility]); the  in [place] does not offer LiveStrong. The pt is very interested and was provided the brochure and required medical release form.    The pt stated that she has a living will at home that she agreed to bring in for scanning.    OSW provided her contact information and remains available as needs arise.    Olga Chavez LCSW  Oncology Social Worker

## 2019-08-08 PROCEDURE — 77412 RADIATION TX DELIVERY LVL 3: CPT | Performed by: RADIOLOGY

## 2019-08-09 ENCOUNTER — RADIATION ONCOLOGY WEEKLY ASSESSMENT (OUTPATIENT)
Dept: RADIATION ONCOLOGY | Facility: CLINIC | Age: 59
End: 2019-08-09

## 2019-08-09 VITALS
HEIGHT: 62 IN | WEIGHT: 211 LBS | SYSTOLIC BLOOD PRESSURE: 120 MMHG | HEART RATE: 71 BPM | OXYGEN SATURATION: 98 % | BODY MASS INDEX: 38.83 KG/M2 | DIASTOLIC BLOOD PRESSURE: 69 MMHG

## 2019-08-09 DIAGNOSIS — Z17.0 MALIGNANT NEOPLASM OF LOWER-INNER QUADRANT OF RIGHT BREAST OF FEMALE, ESTROGEN RECEPTOR POSITIVE (HCC): Primary | ICD-10-CM

## 2019-08-09 DIAGNOSIS — C50.311 MALIGNANT NEOPLASM OF LOWER-INNER QUADRANT OF RIGHT BREAST OF FEMALE, ESTROGEN RECEPTOR POSITIVE (HCC): Primary | ICD-10-CM

## 2019-08-09 PROCEDURE — 77412 RADIATION TX DELIVERY LVL 3: CPT | Performed by: RADIOLOGY

## 2019-08-09 NOTE — PROGRESS NOTES
Physician Weekly Management Note    Diagnosis:     Diagnosis Plan   1. Malignant neoplasm of lower-inner quadrant of right breast of female, estrogen receptor positive (CMS/HCC)         RT Details:  Treatment #5/21    Notes on Treatment course, Films, Medical progress: No complaints.  No erythema as yet on exam incisions well-healed.  Continue as planned.     Weekly Management:  Medication reviewed?   Yes  New medications given?   No  Problemlist reviewed?   Yes  Problem added?   No       Technical aspects reviewed:  Weekly OBI approved?   Yes  Weekly port films approved?   Yes  Change requests noted on port film?   No  Patient setup and plan reviewed?   Yes    Chart Reviewed:  Continue current treatment plan?   Yes  Treatment plan change requested?   No  CBC reviewed?   No  Concurrent Chemo?   No    Objective     Toxicities:   As above     Review of Systems   As above    There were no vitals filed for this visit.    Current Status 7/30/2019   ECOG score 0       Physical Exam  As above      Problem Summary List    Diagnosis:     Diagnosis Plan   1. Malignant neoplasm of lower-inner quadrant of right breast of female, estrogen receptor positive (CMS/HCC)       Pathology:       Past Medical History:   Diagnosis Date   • Arthritis    • Carpal tunnel syndrome    • Disease of thyroid gland     Hypothyroidism   • Graves disease    • Hearing loss     earing aids    • History of urinary tract infection    • Hypertension    • Malignant neoplasm of lower-inner quadrant of right breast of female, estrogen receptor positive (CMS/HCC) 6/24/2019   • Palpitations     Dr. Cheng PMD Benign    • Seasonal allergies    • Vertigo          Past Surgical History:   Procedure Laterality Date   • BREAST BIOPSY Right     MALIGNANT   • BREAST BIOPSY Right 2013    BENIGN   • BREAST LUMPECTOMY Right 7/8/2019    Procedure: BREAST LUMPECTOMY WITH SENTINEL NODE BIOPSY AND NEEDLE LOCALIZATION;  Surgeon: Evelin Ayala MD;  Location: [facility];  Service: General   • CARPAL TUNNEL RELEASE Right    • CERVICAL CONE BIOPSY     • CHOLECYSTECTOMY  1983   • COLONOSCOPY     • GALLBLADDER SURGERY     • HYSTERECTOMY  2016   • LAPAROSCOPIC TUBAL LIGATION     • OOPHORECTOMY     • UT TOTAL ABDOM HYSTERECTOMY Bilateral 7/28/2016    Procedure: TOTAL ABDOMINAL HYSTERECTOMY W/ BSO ;  Surgeon: George Strange MD;  Location: [facility];  Service: Obstetrics/Gynecology   • ROTATOR CUFF REPAIR Right    • TONSILLECTOMY  1963         Current Outpatient Medications on File Prior to Visit   Medication Sig Dispense Refill   • anastrozole (ARIMIDEX) 1 MG tablet Take 1 tablet by mouth Daily for 30 days. 30 tablet 4   • celecoxib (CeleBREX) 200 MG capsule Take 200 mg by mouth Daily. PATIENT TO CONTACT DR. TRA NAILS REGARDING HOLDING PRIOR TO SURGERY     • cetirizine (ZyrTEC) 10 MG tablet Take 10 mg by mouth daily.     • diclofenac (VOLTAREN) 1 % gel gel Apply 4 g topically to the appropriate area as directed 4 (Four) Times a Day As Needed. HOLD PRIOR TO SURGERY     • DULoxetine (CYMBALTA) 60 MG capsule Take 60 mg by mouth daily. LAST DOSE 7/17/16     • levothyroxine (SYNTHROID, LEVOTHROID) 100 MCG tablet Take 100 mcg by mouth daily.     • losartan-hydrochlorothiazide (HYZAAR) 100-12.5 MG per tablet Take 1 tablet by mouth Every Morning.     • SAXENDA 18 MG/3ML solution pen-injector        No current facility-administered medications on file prior to visit.        No Known Allergies      Primary care MD:    Gopal Cheng MD    Oncologist:    ADA Morales MD    Seen and approved by:  Hemanth Rosales MD  08/09/2019

## 2019-08-11 PROCEDURE — 77336 RADIATION PHYSICS CONSULT: CPT | Performed by: RADIOLOGY

## 2019-08-12 ENCOUNTER — HOSPITAL ENCOUNTER (OUTPATIENT)
Dept: PHYSICAL THERAPY | Facility: HOSPITAL | Age: 59
Setting detail: THERAPIES SERIES
Discharge: HOME | End: 2019-08-12

## 2019-08-12 DIAGNOSIS — Z17.0 MALIGNANT NEOPLASM OF LOWER-INNER QUADRANT OF RIGHT BREAST OF FEMALE, ESTROGEN RECEPTOR POSITIVE (HCC): Primary | ICD-10-CM

## 2019-08-12 DIAGNOSIS — N95.0 POSTMENOPAUSAL BLEEDING: ICD-10-CM

## 2019-08-12 DIAGNOSIS — Z91.89 AT RISK FOR LYMPHEDEMA: ICD-10-CM

## 2019-08-12 DIAGNOSIS — Z90.11 S/P PARTIAL MASTECTOMY, RIGHT: ICD-10-CM

## 2019-08-12 DIAGNOSIS — C50.311 MALIGNANT NEOPLASM OF LOWER-INNER QUADRANT OF RIGHT BREAST OF FEMALE, ESTROGEN RECEPTOR POSITIVE (HCC): Primary | ICD-10-CM

## 2019-08-12 PROCEDURE — 77412 RADIATION TX DELIVERY LVL 3: CPT | Performed by: RADIOLOGY

## 2019-08-12 PROCEDURE — 97110 THERAPEUTIC EXERCISES: CPT | Performed by: PHYSICAL THERAPIST

## 2019-08-12 PROCEDURE — 77417 THER RADIOLOGY PORT IMAGE(S): CPT | Performed by: RADIOLOGY

## 2019-08-12 PROCEDURE — 77427 RADIATION TX MANAGEMENT X5: CPT | Performed by: RADIOLOGY

## 2019-08-12 PROCEDURE — 97162 PT EVAL MOD COMPLEX 30 MIN: CPT | Performed by: PHYSICAL THERAPIST

## 2019-08-12 PROCEDURE — 93702 BIS XTRACELL FLUID ANALYSIS: CPT | Performed by: PHYSICAL THERAPIST

## 2019-08-12 NOTE — THERAPY EVALUATION
Physical Therapy Lymphedema Initial Evaluation/Bioimpedance   [facility]     Patient Name: Emily Azevedo  : 1960  MRN: [id number]  Today's Date: 2019      Visit Date: 2019    Visit Dx:    ICD-10-CM ICD-9-CM   1. Malignant neoplasm of lower-inner quadrant of right breast of female, estrogen receptor positive (CMS/HCC) C50.311 174.3    Z17.0 V86.0   2. At risk for lymphedema Z91.89 V49.89   3. S/P partial mastectomy, right Z90.11 V45.71   4. Postmenopausal bleeding N95.0 627.1       Patient Active Problem List   Diagnosis   • Postmenopausal bleeding   • Malignant neoplasm of lower-inner quadrant of right breast of female, estrogen receptor positive (CMS/HCC)        Past Medical History:   Diagnosis Date   • Arthritis    • Carpal tunnel syndrome    • Disease of thyroid gland     Hypothyroidism   • Graves disease    • Hearing loss     earing aids    • History of urinary tract infection    • Hypertension    • Malignant neoplasm of lower-inner quadrant of right breast of female, estrogen receptor positive (CMS/HCC) 2019   • Palpitations     Dr. Cheng PMD Benign    • Seasonal allergies    • Vertigo         Past Surgical History:   Procedure Laterality Date   • BREAST BIOPSY Right     MALIGNANT   • BREAST BIOPSY Right     BENIGN   • BREAST LUMPECTOMY Right 2019    Procedure: BREAST LUMPECTOMY WITH SENTINEL NODE BIOPSY AND NEEDLE LOCALIZATION;  Surgeon: Evelin Ayala MD;  Location: [facility];  Service: General   • CARPAL TUNNEL RELEASE Right    • CERVICAL CONE BIOPSY     • CHOLECYSTECTOMY     • COLONOSCOPY     • GALLBLADDER SURGERY     • HYSTERECTOMY     • LAPAROSCOPIC TUBAL LIGATION     • OOPHORECTOMY     • [place] TOTAL ABDOM HYSTERECTOMY Bilateral 2016    Procedure: TOTAL ABDOMINAL HYSTERECTOMY W/ BSO ;  Surgeon: George Strange MD;  Location: [facility];  Service: Obstetrics/Gynecology   • ROTATOR CUFF REPAIR Right    • TONSILLECTOMY  1963        Visit Dx:    ICD-10-CM ICD-9-CM   1. Malignant neoplasm of lower-inner quadrant of right breast of female, estrogen receptor positive (CMS/HCC) C50.311 174.3    Z17.0 V86.0   2. At risk for lymphedema Z91.89 V49.89   3. S/P partial mastectomy, right Z90.11 V45.71   4. Postmenopausal bleeding N95.0 627.1     Assessment/Plan  Mrs. Azevedo is a 59 year old female recently diagnosed right breast cancer, s/p partial mastectomy with sentinel node biopsy 0/3 R ALN (+) performed by Dr. Ayala 07/08/2019, did not require chemotherapy, is taking arimidex under care of oncologist Dr. Morales, is currently receiving radiation at [place] under care of radiation oncologist Dr. Blake, did not require reconstruction, is , retired, no current issues with range of motion, strength, function or pain. Does report mild numbness in right UE when waking due to sleeping position. Her baseline bioimpedance is WNL, -0.9. She is at increased risk of post lumpectomy lymphedema syndrome right UE and breast, will reassess post radiation unless otherwise indicated. Encouraged to contact me during this time with any questions, concerns or changes in symptoms.       Patient History     Row Name 08/12/19 1315             History    Chief Complaint  Other 1 (comment) lymphedema prevention  -SC      Date Current Problem(s) Began  07/08/19  -SC      Brief Description of Current Complaint  Patient diagnosed with right breast cancer, s/p partial mastectomy with SNLB 07/08/2019 performed by Dr. Ayala, did not require chemotherapy, is currently receiving radiation. States no issues with pain, range of motion or swelling currently. No issues with her bra. Is , retired. Enjoys sewing and gardening. Does live in [place] and drives 40 minutes to [place]. Is interested in the [employer] program  -SC      Patient/Caregiver Goals  Return to prior level of function;Know what to do to help the symptoms  -SC      Current Tobacco Use   none  -SC      Patient's Rating of General Health  Good  -SC      Hand Dominance  right-handed  -SC      History of Previous Related Injuries  no  -SC      Are you or can you be pregnant  No  -SC         Pain     Pain Comments  reports no pain at this time  -SC         Fall Risk Assessment    Any falls in the past year:  No  -SC      Previous Functional Level  -- independent  -SC         Services    Are you currently receiving Home Health services  No  -SC         Daily Activities    Primary Language  English  -SC      Are you able to read  Yes  -SC      Are you able to write  Yes  -SC      How does patient learn best?  Reading  -SC      Teaching needs identified  Management of Condition;Home Exercise Program  -SC      Patient is concerned about/has problems with  Performing home management (household chores, shopping, care of dependents);Performing job responsibilities/community activities (work, school,;Performing sports, recreation, and play activities;Reaching over head;Repetitive movements of the hand, arm, shoulder  -SC      Does patient have problems with the following?  -- none listed  -SC      Barriers to learning  None  -SC      Pt Participated in POC and Goals  Yes  -SC         Safety    Are you being hurt, hit, or frightened by anyone at home or in your life?  No  -SC      Are you being neglected by a caregiver  No  -SC        User Key  (r) = Recorded By, (t) = Taken By, (c) = Cosigned By    Initials Name Provider Type    Karli Guerin PT Physical Therapist          Lymphedema     Row Name 08/12/19 6650             Lymphedema Assessment    Lymphedema Classification  RUE:;Other:;secondary;at risk/stage 0 right breast, at risk  -SC      Lymphedema Cancer Related Sx  right;lumpectomy;sentinel node biopsy  -SC      Lymph Nodes Removed #  3  -SC      Positive Lymph Nodes #  0  -SC      Stage of Cancer  Stage I  -SC      Chemo Received  no  -SC      Radiation Therapy Received  yes  -SC      Radiation  Treatments #/Timeframe  5/21  -SC      Adverse Radiation Reactions/Complication  none  -SC      Infections or Cellulitis?  no  -SC      Lymphedema Assessment Comments  low right arm, moderate risk breast  -SC         Lymphedema Edema Assessment    Edema Assessment Comment  negative currently  -SC         Skin Changes/Observations    Skin Observations Comment  incisions healing well  -SC         Lymphedema Sensation    Lymphedema Sensation Tests  light touch  -SC      Lymphedema Light Touch  RUE:;WNL  -SC         Lymphedema Pulses/Capillary Refill    Lymph Pulses Capillary Refill Comments  intact  -SC         Manual Lymphatic Drainage    Manual Therapy  education of breast massage post radiation  -SC         Compression/Skin Care    Compression/Skin Care Comments  education of compression for prevention and intervention if indicated  -SC         L-Dex Bioimpedence Screening    L-Dex Measurement Extremity  RUE  -SC      L-Dex Patient Position  Standing  -SC      L-Dex UE Dominate Side  Right  -SC      L-Dex UE At Risk Side  Right  -SC      L-Dex UE Pre Surgical Value  No  -SC      L-Dex UE Score  -0.9  -SC      L-Dex UE Baseline Score  -0.9  -SC      L-Dex UE Value Change  0  -SC      L-Dex UE Comment  WNL  -SC      $ L-Dex Charge  yes  -SC        User Key  (r) = Recorded By, (t) = Taken By, (c) = Cosigned By    Initials Name Provider Type    SC Karli Peter PT Physical Therapist            PT Ortho     Row Name 08/12/19 0767       Subjective Comments    Subjective Comments  initial evaluation  -SC       Precautions and Contraindications    Precautions/Limitations  other (see comments)  -SC    Precautions  R SLNB, no BP/IV  -SC    Contraindications  no modalities  -SC       Subjective Pain    Able to rate subjective pain?  yes  -SC    Pre-Treatment Pain Level  0  -SC       Posture/Observations    Alignment Options  Forward head;Cervical lordosis;Thoracic kyphosis;Rounded shoulders  -SC    Forward Head   Mild;Increased;Sitting posture  -SC    Cervical Lordosis  Normal  -SC    Thoracic Kyphosis  Mild;Increased;Sitting posture  -SC    Rounded Shoulders  Mild;Increased;Sitting posture  -SC    Observations  Incision healing  -SC       General ROM    GENERAL ROM COMMENTS  B UEs WNLs  -SC       MMT (Manual Muscle Testing)    General MMT Comments  B UEs WNLs  -SC       Gait/Stairs Assessment/Training    Comment (Gait/Stairs)  normal  -SC      User Key  (r) = Recorded By, (t) = Taken By, (c) = Cosigned By    Initials Name Provider Type    Karli Guerin PT Physical Therapist                    Therapy Education  Education Details: lymphedema, stages, prevention/early intervention/treatment/management, bioimpedance score and results, compression, bra fitting, skin care post radiation, continuation of care  Given: Symptoms/condition management, Edema management, Other (comment)  Program: New  How Provided: Verbal, Demonstration, Written  Provided to: Patient  Level of Understanding: Teach back education performed, Verbalized, Demonstrated      Exercises     Row Name 08/12/19 1315             Subjective Comments    Subjective Comments  initial evaluation  -SC         Subjective Pain    Able to rate subjective pain?  yes  -SC      Pre-Treatment Pain Level  0  -SC        User Key  (r) = Recorded By, (t) = Taken By, (c) = Cosigned By    Initials Name Provider Type    Karli Guerin PT Physical Therapist                      PT OP Goals     Row Name 08/12/19 1317          Long Term Goals    LTG Date to Achieve  11/11/19  -SC     LTG 1  Patient's bioimpedance score to remain between -10 and 6.5 for decreased risk of developing stage II post lumpectomy lymphedema syndrome right UE and to establish treatment for early intervention of condition if/when indicated.  -SC     LTG 2  Patient demonstrate independence and compliance with proper skin care during/post radiation for improved mobility, decreased scar tissue,  axillary cording and edema.  -SC     LTG 3  Patient independent and compliant with breast mobilization techniques for improved mobility, skin care and lymphatic flow.  -SC        Time Calculation    PT Goal Re-Cert Due Date  11/11/19  -SC       User Key  (r) = Recorded By, (t) = Taken By, (c) = Cosigned By    Initials Name Provider Type    SC Karli Peter, PT Physical Therapist          PT Assessment/Plan     Row Name 08/12/19 1745          PT Assessment    Functional Limitations  Limitation in home management;Limitations in community activities;Performance in sport activities  -SC     Impairments  Impaired lymphatic circulation;Impaired flexibility;Integumentary integrity  -SC     Assessment Comments  Mrs. Azevedo is a 59 year old female recently diagnosed right breast cancer, s/p partial mastectomy with sentinel node biopsy 0/3 R ALN (+) performed by Dr. Ayala 07/08/2019, did not require chemotherapy, is taking arimidex under care of oncologist Dr. Morales, is currently receiving radiation at [place] under care of radiation oncologist Dr. Blake, did not require reconstruction, is , retired, no current issues with range of motion, strength, function or pain. Does report mild numbness in right UE when waking due to sleeping position. Her baseline bioimpedance is WNL, -0.9. She is at increased risk of post lumpectomy lymphedema syndrome right UE and breast, will reassess post radiation unless otherwise indicated. Encouraged to contact me during this time with any questions, concerns or changes in symptoms.   -SC     Please refer to paper survey for additional self-reported information  Yes  -SC     Rehab Potential  Good  -SC     Patient/caregiver participated in establishment of treatment plan and goals  Yes  -SC     Patient would benefit from skilled therapy intervention  Yes  -SC        PT Plan    PT Frequency  Other (comment)  -SC     Predicted Duration of Therapy Intervention (Therapy Eval)   post radiation reassess unless otherwise indicated  -SC     Planned CPT's?  PT EVAL MOD COMPLELITY: 07724;PT RE-EVAL: 48107;PT THER PROC EA 15 MIN: 63359;PT THER ACT EA 15 MIN: 68579;PT MANUAL THERAPY EA 15 MIN: 40140;PT NEUROMUSC RE-EDUCATION EA 15 MIN: 64440;PT GAIT TRAINING EA 15 MIN: 78872;PT EVAL AQUA: 51302;PT AQUATIC THERAPY EA 15 MIN: 78726;PT SELF CARE/HOME MGMT/TRAIN EA 15: 74967;PT HOT OR COLD PACK TREAT MCARE;PT BIS XTRACELL FLUID ANALYSIS: 71612  -SC     PT Plan Comments  post radiation, instruct skin care, massage, bra referral if indicated  -SC       User Key  (r) = Recorded By, (t) = Taken By, (c) = Cosigned By    Initials Name Provider Type    SC Karli Peter, PT Physical Therapist             Outcome Measure Options: Disabilities of the Arm, Shoulder, and Hand (DASH)  DASH  Open a tight or new jar.: Mild Difficulty  Write: No Difficulty  Turn a key: No Difficulty  Prepare a meal: No Difficulty  Push open a heavy door: No Difficulty  Place an object on a shelf above your head: Mild Difficulty  Do heavy household chores (e.g., wash walls, wash floors): No Difficulty  Garden or do yard work: Mild Difficulty  Make a bed: No Difficulty  Carry a shopping bag or briefcase: No Difficulty  Carry a heavy object (over 10 lbs): No Difficulty  Change a lightbulb overhead: No Difficulty  Wash or blow dry your hair: No Difficulty  Wash your back: No Difficulty  Put on a pullover sweater: No Difficulty  Use a knife to cut food: No Difficulty  Recreational activities in which require little effort (e.g., cardplaying, knitting, etc.): No Difficulty  Recreational activities in which you take some force or impact through your arm, should or hand (e.g. golf, hammering, tennis, etc.): Moderate Difficulty  Recreational Activities in which you move your arm freely (e.g., frisbee, badminton, etc.): Moderate Difficulty  Manage transportation needs (getting from one place to another): No Difficulty  Sexual Activities:  No Difficulty  During the past week, to what extent has your arm, shoulder, or hand problem interfered with your normal social activites with family, friends, neighbors or groups?: Not at all  During the past week, were you limited in your work or other regular daily activities as a result of your arm, shoulder or hand problem?: Not limited at all  Arm, Shoulder, or hand pain: None  Arm, shoulder or hand pain when you performed any specific activity: None  Tingling (pins and needles) in your arm, shoulder, or hand: None  Weakness in your arm, shoulder or hand: None  Stiffness in your arm, shoulder or hand: Mild  During the past week, how much difficulty have you had sleeping because of the pain in your arm, shoulder or hand?: No difficulty  I feel less capable, less confident or less useful because of my arm, shoulder or hand problem: Strongly disagree  DASH Sum : 38  Number of Questions Answered: 30  DASH Score: 6.67         Time Calculation:   Start Time: 1315  Stop Time: 1351  Time Calculation (min): 36 min   Therapy Charges for Today     Code Description Service Date Service Provider Modifiers Qty    92338570481 HC PT BIS XTRACELL FLUID ANALYSIS 8/12/2019 Karli Peter, PT  1    37181806322 HC PT THER PROC EA 15 MIN 8/12/2019 Karli Peter, PT GP 1    10761765545 HC PT EVAL MOD COMPLEXITY 1 8/12/2019 Karli Peter, PT GP 1          PT G-Codes  Outcome Measure Options: Disabilities of the Arm, Shoulder, and Hand (DASH)         Karli Estrada, MALCOM  8/12/2019

## 2019-08-13 ENCOUNTER — RADIATION ONCOLOGY WEEKLY ASSESSMENT (OUTPATIENT)
Dept: RADIATION ONCOLOGY | Facility: CLINIC | Age: 59
End: 2019-08-13

## 2019-08-13 ENCOUNTER — APPOINTMENT (OUTPATIENT)
Dept: PHYSICAL THERAPY | Facility: HOSPITAL | Age: 59
End: 2019-08-13

## 2019-08-13 VITALS
DIASTOLIC BLOOD PRESSURE: 76 MMHG | OXYGEN SATURATION: 98 % | WEIGHT: 211 LBS | BODY MASS INDEX: 38.83 KG/M2 | HEART RATE: 76 BPM | HEIGHT: 62 IN | SYSTOLIC BLOOD PRESSURE: 131 MMHG

## 2019-08-13 DIAGNOSIS — C50.311 MALIGNANT NEOPLASM OF LOWER-INNER QUADRANT OF RIGHT BREAST OF FEMALE, ESTROGEN RECEPTOR POSITIVE (HCC): Primary | ICD-10-CM

## 2019-08-13 DIAGNOSIS — Z17.0 MALIGNANT NEOPLASM OF LOWER-INNER QUADRANT OF RIGHT BREAST OF FEMALE, ESTROGEN RECEPTOR POSITIVE (HCC): Primary | ICD-10-CM

## 2019-08-13 PROCEDURE — 77412 RADIATION TX DELIVERY LVL 3: CPT | Performed by: RADIOLOGY

## 2019-08-13 NOTE — PROGRESS NOTES
Physician Weekly Management Note    Diagnosis:     Diagnosis Plan   1. Malignant neoplasm of lower-inner quadrant of right breast of female, estrogen receptor positive (CMS/HCC)         RT Details:  fx 7/21 right breast    Notes on Treatment course, Films, Medical progress:  Doing well, no erythema    Weekly Management:  Medication reviewed?   Yes  New medications given?   No  Problemlist reviewed?   Yes  Problem added?   No  Issues raised requiring referral to support services - task assigned to:  na    Technical aspects reviewed:  Weekly OBI approved?   Yes  Weekly port films approved?   Yes  Change requests noted on port film?   No  Patient setup and plan reviewed?   Yes    Chart Reviewed:  Continue current treatment plan?   Yes  Treatment plan change requested?   No  CBC reviewed?   No  Concurrent Chemo?   No    Objective     Toxicities:   none     Review of Systems   Constitutional: Negative.    Skin: Negative.           Vitals:    08/13/19 1006   BP: 131/76   Pulse: 76   SpO2: 98%       Current Status 7/30/2019   ECOG score 0       Physical Exam   Constitutional: She appears well-developed.   Pulmonary/Chest:   No erythema               Problem Summary List    Diagnosis:     Diagnosis Plan   1. Malignant neoplasm of lower-inner quadrant of right breast of female, estrogen receptor positive (CMS/HCC)       Pathology:   breast    Past Medical History:   Diagnosis Date   • Arthritis    • Carpal tunnel syndrome    • Disease of thyroid gland     Hypothyroidism   • Graves disease    • Hearing loss     earing aids    • History of urinary tract infection    • Hypertension    • Malignant neoplasm of lower-inner quadrant of right breast of female, estrogen receptor positive (CMS/HCC) 6/24/2019   • Palpitations     Dr. Cheng PMD Benign    • Seasonal allergies    • Vertigo          Past Surgical History:   Procedure Laterality Date   • BREAST BIOPSY Right     MALIGNANT   • BREAST BIOPSY Right 2013    BENIGN   • BREAST  LUMPECTOMY Right 7/8/2019    Procedure: BREAST LUMPECTOMY WITH SENTINEL NODE BIOPSY AND NEEDLE LOCALIZATION;  Surgeon: Evelin Ayala MD;  Location: [facility];  Service: General   • CARPAL TUNNEL RELEASE Right    • CERVICAL CONE BIOPSY     • CHOLECYSTECTOMY  1983   • COLONOSCOPY     • GALLBLADDER SURGERY     • HYSTERECTOMY  2016   • LAPAROSCOPIC TUBAL LIGATION     • OOPHORECTOMY     • [place] TOTAL ABDOM HYSTERECTOMY Bilateral 7/28/2016    Procedure: TOTAL ABDOMINAL HYSTERECTOMY W/ BSO ;  Surgeon: George Strange MD;  [facility];  Service: Obstetrics/Gynecology   • ROTATOR CUFF REPAIR Right    • TONSILLECTOMY  1963         Current Outpatient Medications on File Prior to Visit   Medication Sig Dispense Refill   • anastrozole (ARIMIDEX) 1 MG tablet Take 1 tablet by mouth Daily for 30 days. 30 tablet 4   • celecoxib (CeleBREX) 200 MG capsule Take 200 mg by mouth Daily. PATIENT TO CONTACT DR. EVELIN AYALA REGARDING HOLDING PRIOR TO SURGERY     • cetirizine (ZyrTEC) 10 MG tablet Take 10 mg by mouth daily.     • diclofenac (VOLTAREN) 1 % gel gel Apply 4 g topically to the appropriate area as directed 4 (Four) Times a Day As Needed. HOLD PRIOR TO SURGERY     • DULoxetine (CYMBALTA) 60 MG capsule Take 60 mg by mouth daily. LAST DOSE 7/17/16     • levothyroxine (SYNTHROID, LEVOTHROID) 100 MCG tablet Take 100 mcg by mouth daily.     • losartan-hydrochlorothiazide (HYZAAR) 100-12.5 MG per tablet Take 1 tablet by mouth Every Morning.     • SAXENDA 18 MG/3ML solution pen-injector        No current facility-administered medications on file prior to visit.        No Known Allergies      Referring Provider:    No referring provider defined for this encounter.    Oncologist:  No primary care provider on file.      Seen and approved by:  Maddie Blake MD  08/13/2019

## 2019-08-14 PROCEDURE — 77300 RADIATION THERAPY DOSE PLAN: CPT | Performed by: RADIOLOGY

## 2019-08-14 PROCEDURE — 77412 RADIATION TX DELIVERY LVL 3: CPT | Performed by: RADIOLOGY

## 2019-08-15 PROCEDURE — 77412 RADIATION TX DELIVERY LVL 3: CPT | Performed by: RADIOLOGY

## 2019-08-16 PROCEDURE — 77412 RADIATION TX DELIVERY LVL 3: CPT | Performed by: RADIOLOGY

## 2019-08-18 PROCEDURE — 77336 RADIATION PHYSICS CONSULT: CPT | Performed by: RADIOLOGY

## 2019-08-19 PROCEDURE — 77427 RADIATION TX MANAGEMENT X5: CPT | Performed by: RADIOLOGY

## 2019-08-19 PROCEDURE — 77412 RADIATION TX DELIVERY LVL 3: CPT | Performed by: RADIOLOGY

## 2019-08-19 PROCEDURE — 77417 THER RADIOLOGY PORT IMAGE(S): CPT | Performed by: RADIOLOGY

## 2019-08-20 ENCOUNTER — RADIATION ONCOLOGY WEEKLY ASSESSMENT (OUTPATIENT)
Dept: RADIATION ONCOLOGY | Facility: CLINIC | Age: 59
End: 2019-08-20

## 2019-08-20 VITALS
HEIGHT: 62 IN | DIASTOLIC BLOOD PRESSURE: 76 MMHG | HEART RATE: 70 BPM | SYSTOLIC BLOOD PRESSURE: 133 MMHG | OXYGEN SATURATION: 98 % | BODY MASS INDEX: 38.83 KG/M2 | WEIGHT: 211 LBS

## 2019-08-20 DIAGNOSIS — C50.311 MALIGNANT NEOPLASM OF LOWER-INNER QUADRANT OF RIGHT BREAST OF FEMALE, ESTROGEN RECEPTOR POSITIVE (HCC): Primary | ICD-10-CM

## 2019-08-20 DIAGNOSIS — Z17.0 MALIGNANT NEOPLASM OF LOWER-INNER QUADRANT OF RIGHT BREAST OF FEMALE, ESTROGEN RECEPTOR POSITIVE (HCC): Primary | ICD-10-CM

## 2019-08-20 PROCEDURE — 77412 RADIATION TX DELIVERY LVL 3: CPT | Performed by: RADIOLOGY

## 2019-08-20 NOTE — PROGRESS NOTES
"Physician Weekly Management Note    Diagnosis:     Diagnosis Plan   1. Malignant neoplasm of lower-inner quadrant of right breast of female, estrogen receptor positive (CMS/HCC)         RT Details: fx 12/21 right breast    Notes on Treatment course, Films, Medical progress:  Reports some nipple tenderness/ \"throbbing\" at times but intermittent, no really painful, no erythema, no fatigue    Weekly Management:  Medication reviewed?   Yes  New medications given?   No  Problemlist reviewed?   Yes  Problem added?   No  Issues raised requiring referral to support services - task assigned to:  raina    Technical aspects reviewed:  Weekly OBI approved?   Yes  Weekly port films approved?   Yes  Change requests noted on port film?   No  Patient setup and plan reviewed?   Yes    Chart Reviewed:  Continue current treatment plan?   Yes  Treatment plan change requested?   No  CBC reviewed?   No  Concurrent Chemo?   No    Objective     Toxicities:   Nipple tenderness     Review of Systems   Constitutional: Negative.           Vitals:    08/20/19 1007   BP: 133/76   Pulse: 70   SpO2: 98%       Current Status 7/30/2019   ECOG score 0       Physical Exam   Constitutional: She appears well-nourished.   Pulmonary/Chest:   No erythema               Problem Summary List    Diagnosis:     Diagnosis Plan   1. Malignant neoplasm of lower-inner quadrant of right breast of female, estrogen receptor positive (CMS/HCC)       Pathology:   breast    Past Medical History:   Diagnosis Date   • Arthritis    • Carpal tunnel syndrome    • Disease of thyroid gland     Hypothyroidism   • Graves disease    • Hearing loss     earing aids    • History of urinary tract infection    • Hypertension    • Malignant neoplasm of lower-inner quadrant of right breast of female, estrogen receptor positive (CMS/HCC) 6/24/2019   • Palpitations     Dr. Cheng PMD Benign    • Seasonal allergies    • Vertigo          Past Surgical History:   Procedure Laterality Date   • " BREAST BIOPSY Right     MALIGNANT   • BREAST BIOPSY Right 2013    BENIGN   • BREAST LUMPECTOMY Right 7/8/2019    Procedure: BREAST LUMPECTOMY WITH SENTINEL NODE BIOPSY AND NEEDLE LOCALIZATION;  Surgeon: Evelin Ayala MD;  Location: [facility];  Service: General   • CARPAL TUNNEL RELEASE Right    • CERVICAL CONE BIOPSY     • CHOLECYSTECTOMY  1983   • COLONOSCOPY     • GALLBLADDER SURGERY     • HYSTERECTOMY  2016   • LAPAROSCOPIC TUBAL LIGATION     • OOPHORECTOMY     • [place] TOTAL ABDOM HYSTERECTOMY Bilateral 7/28/2016    Procedure: TOTAL ABDOMINAL HYSTERECTOMY W/ BSO ;  Surgeon: George Strange MD;  Location: [facility] OR;  Service: Obstetrics/Gynecology   • ROTATOR CUFF REPAIR Right    • TONSILLECTOMY  1963         Current Outpatient Medications on File Prior to Visit   Medication Sig Dispense Refill   • anastrozole (ARIMIDEX) 1 MG tablet Take 1 tablet by mouth Daily for 30 days. 30 tablet 4   • celecoxib (CeleBREX) 200 MG capsule Take 200 mg by mouth Daily. PATIENT TO CONTACT DR. EVELIN AYALA REGARDING HOLDING PRIOR TO SURGERY     • cetirizine (ZyrTEC) 10 MG tablet Take 10 mg by mouth daily.     • diclofenac (VOLTAREN) 1 % gel gel Apply 4 g topically to the appropriate area as directed 4 (Four) Times a Day As Needed. HOLD PRIOR TO SURGERY     • DULoxetine (CYMBALTA) 60 MG capsule Take 60 mg by mouth daily. LAST DOSE 7/17/16     • levothyroxine (SYNTHROID, LEVOTHROID) 100 MCG tablet Take 100 mcg by mouth daily.     • losartan-hydrochlorothiazide (HYZAAR) 100-12.5 MG per tablet Take 1 tablet by mouth Every Morning.     • SAXENDA 18 MG/3ML solution pen-injector        No current facility-administered medications on file prior to visit.        No Known Allergies      Referring Provider:    No referring provider defined for this encounter.    Oncologist:  No primary care provider on file.      Seen and approved by:  Maddie Blake MD  08/20/2019

## 2019-08-21 PROCEDURE — 77412 RADIATION TX DELIVERY LVL 3: CPT | Performed by: RADIOLOGY

## 2019-08-22 PROCEDURE — 77412 RADIATION TX DELIVERY LVL 3: CPT | Performed by: RADIOLOGY

## 2019-08-23 PROCEDURE — 77412 RADIATION TX DELIVERY LVL 3: CPT | Performed by: RADIOLOGY

## 2019-08-26 ENCOUNTER — RADIATION ONCOLOGY WEEKLY ASSESSMENT (OUTPATIENT)
Dept: RADIATION ONCOLOGY | Facility: CLINIC | Age: 59
End: 2019-08-26

## 2019-08-26 VITALS
WEIGHT: 211.42 LBS | HEART RATE: 69 BPM | BODY MASS INDEX: 38.91 KG/M2 | OXYGEN SATURATION: 97 % | DIASTOLIC BLOOD PRESSURE: 79 MMHG | HEIGHT: 62 IN | SYSTOLIC BLOOD PRESSURE: 138 MMHG

## 2019-08-26 DIAGNOSIS — C50.311 MALIGNANT NEOPLASM OF LOWER-INNER QUADRANT OF RIGHT BREAST OF FEMALE, ESTROGEN RECEPTOR POSITIVE (HCC): Primary | ICD-10-CM

## 2019-08-26 DIAGNOSIS — Z17.0 MALIGNANT NEOPLASM OF LOWER-INNER QUADRANT OF RIGHT BREAST OF FEMALE, ESTROGEN RECEPTOR POSITIVE (HCC): Primary | ICD-10-CM

## 2019-08-26 PROCEDURE — 77412 RADIATION TX DELIVERY LVL 3: CPT | Performed by: RADIOLOGY

## 2019-08-26 PROCEDURE — 77417 THER RADIOLOGY PORT IMAGE(S): CPT | Performed by: RADIOLOGY

## 2019-08-26 PROCEDURE — 77427 RADIATION TX MANAGEMENT X5: CPT | Performed by: RADIOLOGY

## 2019-08-26 PROCEDURE — 77336 RADIATION PHYSICS CONSULT: CPT | Performed by: RADIOLOGY

## 2019-08-26 PROCEDURE — 77280 THER RAD SIMULAJ FIELD SMPL: CPT | Performed by: RADIOLOGY

## 2019-08-26 NOTE — PROGRESS NOTES
Physician Weekly Management Note    Diagnosis:     Diagnosis Plan   1. Malignant neoplasm of lower-inner quadrant of right breast of female, estrogen receptor positive (CMS/HCC)         RT Details:  fx 16/16 right breast tangents start boost tomorrow x 5  Notes on Treatment course, Films, Medical progress:  Doing well, minimal erythema, fatigue, set up boost today on machine, I verified tx field    Weekly Management:  Medication reviewed?   Yes  New medications given?   No  Problemlist reviewed?   Yes  Problem added?   No  Issues raised requiring referral to support services - task assigned to:  na    Technical aspects reviewed:  Weekly OBI approved?   Yes  Weekly port films approved?   Yes  Change requests noted on port film?   No  Patient setup and plan reviewed?   Yes    Chart Reviewed:  Continue current treatment plan?   Yes  Treatment plan change requested?   No  CBC reviewed?   No  Concurrent Chemo?   No    Objective     Toxicities:   none     Review of Systems   Constitutional: Positive for fatigue.   Musculoskeletal: Negative.    Skin: Negative.           Vitals:    08/26/19 1011   BP: 138/79   Pulse: 69   SpO2: 97%       Current Status 7/30/2019   ECOG score 0       Physical Exam   Constitutional: She appears well-developed and well-nourished.   Pulmonary/Chest:   Very mild erythema               Problem Summary List    Diagnosis:     Diagnosis Plan   1. Malignant neoplasm of lower-inner quadrant of right breast of female, estrogen receptor positive (CMS/HCC)       Pathology:   breast    Past Medical History:   Diagnosis Date   • Arthritis    • Carpal tunnel syndrome    • Disease of thyroid gland     Hypothyroidism   • Graves disease    • Hearing loss     earing aids    • History of urinary tract infection    • Hypertension    • Malignant neoplasm of lower-inner quadrant of right breast of female, estrogen receptor positive (CMS/HCC) 6/24/2019   • Palpitations     Dr. Cheng PMD Benign    • Seasonal allergies     • Vertigo          Past Surgical History:   Procedure Laterality Date   • BREAST BIOPSY Right     MALIGNANT   • BREAST BIOPSY Right 2013    BENIGN   • BREAST LUMPECTOMY Right 7/8/2019    Procedure: BREAST LUMPECTOMY WITH SENTINEL NODE BIOPSY AND NEEDLE LOCALIZATION;  Surgeon: Evelin Ayala MD;  Location: [facility] [place];  Service: General   • CARPAL TUNNEL RELEASE Right    • CERVICAL CONE BIOPSY     • CHOLECYSTECTOMY  1983   • COLONOSCOPY     • GALLBLADDER SURGERY     • HYSTERECTOMY  2016   • LAPAROSCOPIC TUBAL LIGATION     • OOPHORECTOMY     • HI TOTAL ABDOM HYSTERECTOMY Bilateral 7/28/2016    Procedure: TOTAL ABDOMINAL HYSTERECTOMY W/ BSO ;  Surgeon: George Strange MD;  Location: [facility] OR;  Service: Obstetrics/Gynecology   • ROTATOR CUFF REPAIR Right    • TONSILLECTOMY  1963         Current Outpatient Medications on File Prior to Visit   Medication Sig Dispense Refill   • anastrozole (ARIMIDEX) 1 MG tablet Take 1 tablet by mouth Daily for 30 days. 30 tablet 4   • celecoxib (CeleBREX) 200 MG capsule Take 200 mg by mouth Daily. PATIENT TO CONTACT DR. EVELIN AYALA REGARDING HOLDING PRIOR TO SURGERY     • cetirizine (ZyrTEC) 10 MG tablet Take 10 mg by mouth daily.     • diclofenac (VOLTAREN) 1 % gel gel Apply 4 g topically to the appropriate area as directed 4 (Four) Times a Day As Needed. HOLD PRIOR TO SURGERY     • DULoxetine (CYMBALTA) 60 MG capsule Take 60 mg by mouth daily. LAST DOSE 7/17/16     • levothyroxine (SYNTHROID, LEVOTHROID) 100 MCG tablet Take 100 mcg by mouth daily.     • losartan-hydrochlorothiazide (HYZAAR) 100-12.5 MG per tablet Take 1 tablet by mouth Every Morning.     • SAXENDA 18 MG/3ML solution pen-injector        No current facility-administered medications on file prior to visit.        No Known Allergies      Referring Provider:    No referring provider defined for this encounter.    Oncologist:  No primary care provider on file.      Seen and approved by:   Maddie Blake MD  08/26/2019

## 2019-08-27 ENCOUNTER — HOSPITAL ENCOUNTER (OUTPATIENT)
Dept: PHYSICAL THERAPY | Facility: HOSPITAL | Age: 59
Setting detail: THERAPIES SERIES
Discharge: HOME | End: 2019-08-27

## 2019-08-27 DIAGNOSIS — Z90.11 S/P PARTIAL MASTECTOMY, RIGHT: ICD-10-CM

## 2019-08-27 DIAGNOSIS — C50.311 MALIGNANT NEOPLASM OF LOWER-INNER QUADRANT OF RIGHT BREAST OF FEMALE, ESTROGEN RECEPTOR POSITIVE (HCC): Primary | ICD-10-CM

## 2019-08-27 DIAGNOSIS — Z17.0 MALIGNANT NEOPLASM OF LOWER-INNER QUADRANT OF RIGHT BREAST OF FEMALE, ESTROGEN RECEPTOR POSITIVE (HCC): Primary | ICD-10-CM

## 2019-08-27 DIAGNOSIS — Z91.89 AT RISK FOR LYMPHEDEMA: ICD-10-CM

## 2019-08-27 PROCEDURE — 77412 RADIATION TX DELIVERY LVL 3: CPT | Performed by: RADIOLOGY

## 2019-08-27 PROCEDURE — 97110 THERAPEUTIC EXERCISES: CPT | Performed by: PHYSICAL THERAPIST

## 2019-08-27 PROCEDURE — 93702 BIS XTRACELL FLUID ANALYSIS: CPT | Performed by: PHYSICAL THERAPIST

## 2019-08-27 NOTE — THERAPY TREATMENT NOTE
Outpatient Physical Therapy Lymphedema Treatment Note  [facility]     Patient Name: Emily Azevedo  : 1960  MRN: [id number]  Today's Date: 2019        Visit Date: 2019    Visit Dx:    ICD-10-CM ICD-9-CM   1. Malignant neoplasm of lower-inner quadrant of right breast of female, estrogen receptor positive (CMS/HCC) C50.311 174.3    Z17.0 V86.0   2. At risk for lymphedema Z91.89 V49.89   3. S/P partial mastectomy, right Z90.11 V45.71       Patient Active Problem List   Diagnosis   • Postmenopausal bleeding   • Malignant neoplasm of lower-inner quadrant of right breast of female, estrogen receptor positive (CMS/HCC)        Lymphedema     Row Name 19 1118             Subjective Pain    Able to rate subjective pain?  yes  -SC      Pre-Treatment Pain Level  0  -SC         Subjective Comments    Subjective Comments  I am doing really well. 4 more radiations then I am finished. I am having no pain. My right breast is smaller and I think the radiation is making it even smaller. Dr. Blake had said something about meeting with plastics to have a left reduction if I needed too.   -SC         Lymphedema Assessment    Radiation Therapy Received  yes  -SC      Radiation Treatments #/Timeframe  to complete 2019  -SC      Adverse Radiation Reactions/Complication  very mild skin changes  -SC      Infections or Cellulitis?  no  -SC      Lymphedema Assessment Comments  low R UE, moderate R breast  -SC         General ROM    GENERAL ROM COMMENTS  B UEs WNLs  -SC         MMT (Manual Muscle Testing)    General MMT Comments  B UEs WNLs  -SC         Lymphedema Edema Assessment    Edema Assessment Comment  negative  -SC         Skin Changes/Observations    Skin Observations Comment  very minimal radiation skin changes, pinkness to skin  -SC         Lymphedema Sensation    Lymphedema Sensation Tests  light touch  -SC      Lymphedema Light Touch  RUE:;WNL  -SC         Lymphedema Pulses/Capillary Refill     Lymph Pulses Capillary Refill Comments  intact  -SC         Compression/Skin Care    Compression/Skin Care Comments  education of bra fittings and reconstruction if indicated  -SC         L-Dex Bioimpedence Screening    L-Dex Measurement Extremity  RUE  -SC      L-Dex Patient Position  Standing  -SC      L-Dex UE Dominate Side  Right  -SC      L-Dex UE At Risk Side  Right  -SC      L-Dex UE Pre Surgical Value  No  -SC      L-Dex UE Score  -1.6  -SC      L-Dex UE Baseline Score  -0.9  -SC      L-Dex UE Value Change  -0.7  -SC      L-Dex UE Comment  WNL  -SC      $ L-Dex Charge  yes  -SC        User Key  (r) = Recorded By, (t) = Taken By, (c) = Cosigned By    Initials Name Provider Type    Karli Guerin, PT Physical Therapist                        PT Assessment/Plan     Row Name 08/27/19 1116          PT Assessment    Assessment Comments  Mrs. Azevedo has completed 17/21 radiation sessions, very minimal skin changes, progressing quite well. Bioimpedance L-Dex score is stable at -1.6, assessed goals. To return in Sept to reassess unless otherwise indicated.   -SC        PT Plan    PT Plan Comments  one month post radiation reassessment, instructed post radiation skin care, breast massage, referral for bra fitting if indicated, referral to plastics if indicated.   -SC       User Key  (r) = Recorded By, (t) = Taken By, (c) = Cosigned By    Initials Name Provider Type    Karli Guerin, PT Physical Therapist             Exercises     Row Name 08/27/19 2511             Subjective Comments    Subjective Comments  I am doing really well. 4 more radiations then I am finished. I am having no pain. My right breast is smaller and I think the radiation is making it even smaller. Dr. Blake had said something about meeting with plastics to have a left reduction if I needed too.   -SC         Subjective Pain    Able to rate subjective pain?  yes  -SC      Pre-Treatment Pain Level  0  -SC        User Key  (r) =  Recorded By, (t) = Taken By, (c) = Cosigned By    Initials Name Provider Type    Karli Guerin, PT Physical Therapist                      PT OP Goals     Row Name 08/27/19 1118          Long Term Goals    LTG Date to Achieve  11/11/19  -SC     LTG 1  Patient's bioimpedance score to remain between -10 and 6.5 for decreased risk of developing stage II post lumpectomy lymphedema syndrome right UE and to establish treatment for early intervention of condition if/when indicated.  -SC     LTG 1 Progress  Progressing  -SC     LTG 1 Progress Comments  currently -1.6, WNL and stable for patient  -SC     LTG 2  Patient demonstrate independence and compliance with proper skin care during/post radiation for improved mobility, decreased scar tissue, axillary cording and edema.  -SC     LTG 2 Progress  Progressing  -SC     LTG 2 Progress Comments  compliant with during radiation at this time  -SC     LTG 3  Patient independent and compliant with breast mobilization techniques for improved mobility, skin care and lymphatic flow.  -SC     LTG 3 Progress  Progressing  -SC     LTG 3 Progress Comments  to review post radiation  -SC        Time Calculation    PT Goal Re-Cert Due Date  11/11/19  -SC       User Key  (r) = Recorded By, (t) = Taken By, (c) = Cosigned By    Initials Name Provider Type    Karli Guerin, PT Physical Therapist          Therapy Education  Education Details: bioimpedance test and results, risk factors with radiation, prognosis, early s/s of lymphedema, reviewed stages and treatment. education of post radiation bra fitting if indicated. Reconstruction options if indicated  Given: Symptoms/condition management, Edema management, Other (comment)  Program: Reinforced, Progressed, Modified  How Provided: Verbal, Demonstration  Provided to: Patient  Level of Understanding: Teach back education performed, Verbalized, Demonstrated              Time Calculation:   Start Time: 1118  Stop Time:  1130  Time Calculation (min): 12 min   Therapy Charges for Today     Code Description Service Date Service Provider Modifiers Qty    89811341663 HC PT BIS XTRACELL FLUID ANALYSIS 8/27/2019 Karli Peter, PT  1    89503742127 HC PT THER PROC EA 15 MIN 8/27/2019 Karli Peter, PT GP 1                    Karli Estrada, PT  8/27/2019

## 2019-08-28 PROCEDURE — 77412 RADIATION TX DELIVERY LVL 3: CPT | Performed by: RADIOLOGY

## 2019-08-29 PROCEDURE — 77412 RADIATION TX DELIVERY LVL 3: CPT | Performed by: RADIOLOGY

## 2019-08-30 PROCEDURE — 77412 RADIATION TX DELIVERY LVL 3: CPT | Performed by: RADIOLOGY

## 2019-09-02 PROCEDURE — 77336 RADIATION PHYSICS CONSULT: CPT | Performed by: RADIOLOGY

## 2019-09-03 ENCOUNTER — APPOINTMENT (OUTPATIENT)
Dept: RADIATION ONCOLOGY | Facility: HOSPITAL | Age: 59
End: 2019-09-03

## 2019-09-03 PROCEDURE — 77412 RADIATION TX DELIVERY LVL 3: CPT | Performed by: RADIOLOGY

## 2019-09-04 DIAGNOSIS — Z17.0 MALIGNANT NEOPLASM OF LOWER-INNER QUADRANT OF RIGHT BREAST OF FEMALE, ESTROGEN RECEPTOR POSITIVE (HCC): Primary | ICD-10-CM

## 2019-09-04 DIAGNOSIS — C50.311 MALIGNANT NEOPLASM OF LOWER-INNER QUADRANT OF RIGHT BREAST OF FEMALE, ESTROGEN RECEPTOR POSITIVE (HCC): Primary | ICD-10-CM

## 2019-09-05 ENCOUNTER — DOCUMENTATION (OUTPATIENT)
Dept: RADIATION ONCOLOGY | Facility: HOSPITAL | Age: 59
End: 2019-09-05

## 2019-09-05 ENCOUNTER — APPOINTMENT (OUTPATIENT)
Dept: OTHER | Facility: HOSPITAL | Age: 59
End: 2019-09-05

## 2019-09-05 ENCOUNTER — OFFICE VISIT (OUTPATIENT)
Dept: OTHER | Facility: HOSPITAL | Age: 59
End: 2019-09-05

## 2019-09-05 VITALS
HEART RATE: 72 BPM | WEIGHT: 215 LBS | DIASTOLIC BLOOD PRESSURE: 79 MMHG | RESPIRATION RATE: 18 BRPM | SYSTOLIC BLOOD PRESSURE: 127 MMHG | TEMPERATURE: 97.9 F | BODY MASS INDEX: 39.07 KG/M2 | OXYGEN SATURATION: 96 %

## 2019-09-05 DIAGNOSIS — C50.311 MALIGNANT NEOPLASM OF LOWER-INNER QUADRANT OF RIGHT BREAST OF FEMALE, ESTROGEN RECEPTOR POSITIVE (HCC): Primary | ICD-10-CM

## 2019-09-05 DIAGNOSIS — Z17.0 MALIGNANT NEOPLASM OF LOWER-INNER QUADRANT OF RIGHT BREAST OF FEMALE, ESTROGEN RECEPTOR POSITIVE (HCC): Primary | ICD-10-CM

## 2019-09-05 PROCEDURE — 99215 OFFICE O/P EST HI 40 MIN: CPT | Performed by: NURSE PRACTITIONER

## 2019-09-05 PROCEDURE — G0463 HOSPITAL OUTPT CLINIC VISIT: HCPCS | Performed by: NURSE PRACTITIONER

## 2019-09-05 RX ORDER — ANASTROZOLE 1 MG/1
1 TABLET ORAL DAILY
COMMUNITY
End: 2019-09-30 | Stop reason: SDUPTHER

## 2019-09-05 NOTE — PROGRESS NOTES
[facility] MULTIDISCIPLINARY CLINIC  SURVIVORSHIP VISIT    Emily Azevedo is a pleasant 59 y.o.   female being followed by Reshma Morales MD  for T1 a N0 M0 invasive ductal carcinoma of the right breast.  Patient is status post lumpectomy with sentinel lymph node surgery on July 2019.  She is ER positive, GA positive HER-2/berenice negative. Here today in our Cancer Survivorship Clinic, to review survivorship care plan.    TREATMENT HISTORY:     Oncology/Hematology History    9/19/17, Screening MMG with Daniel ( [facility]):  BI-RADS 2: Benign.     10[facility]17/18[facility]  There are scattered areas of fibroglandular density. Previous percutaneous biopsy clip marker in the upper central right breast. There is a new, irregular nodular opacity in the deep medial right breast along the 3:00 axis near the chest wall measuring just over 1 cm in size, newly visible since prior studies. There is a new circumscribed nodule in the medial right breast about 4 cm from the nipple that is enlarged since the prior exam. This may represent a cyst.                                 BI-RADS 0: Incomplete.    11/07/18, Right Diagnostic MMG & Right Breast US ([facility]):  MMG:  Small focal ill-defined opacity is again noted within the deep medial right breast along the 3:00 axis. This is visible in retrospect on each of the prior studies, and may therefore potentially represent focally prominent normal fibroglandular tissue.   US:  Despite prolonged imaging, no focal lesion could be identified within this portion of the medial right breast. Two benign cysts in the medial right breast near the nipple measuring 0.9 cm and 0.6 cm, corresponding to benign-appearing nodular opacities visible on mammogram today.        BI-RADS 3: Probably benign. Follow-up MMG in six months.        Malignant neoplasm of lower-inner quadrant of right breast of female, estrogen receptor positive (CMS/HCC)    6/2/2019 Initial  Diagnosis     Malignant neoplasm of lower-inner quadrant of right breast of female, estrogen receptor positive (CMS/HCC)         6/3/2019 Imaging     Right Diagnostic MMG & Right Breast US ( [facility]):    MMG:  Small focal irregular opacity is again noted along the 3:00 axis in the deep portion of the right breast about 9 cm from the nipple measuring about 8 mm. Linear opacities surround the lesion in a radial pattern. The morphology is concerning, particularly on tomogram images today.   US:  High-resolution grayscale ultrasound imaging directed to this portion of the breast today does show a subtle focus of acoustic shadowing, although mass is not clearly delineated.   BI-RADS 4: Suspicious.         6/13/2019 Biopsy     Right breast, Stereotactic biopsy ([facility]):    1. Breast, Right Deep Medial 3 O'clock Position, Core Biopsy: Breast parenchyma with               A.  Minimally represented invasive carcinoma of no special type (ductal) measuring 2.8 mm maximally                     on the slide, Meghan histologic grade I (tubules = 1, nuclei = 1, mitoses = 1), with associated ductal                     carcinoma in situ (DCIS) with cribriform and micropapillary architecture and low nuclear grade.    ER+ (%, strong)  OR+ (41-50%, moderate)  HER2 negative (IHC 1+)   Ki-67 1%         6/20/2019 Imaging     Bilateral Breast MRI ( [facility]):  The mammogram showed a very tiny cluster of microcalcifications in the lower inner quadrant of the right breast that were biopsied under stereotactic guidance. The biopsy marking clip is identified in the lower inner quadrant at approximately the 4:00 location. It is contained within the inferior aspect of a tubular shaped biopsy tract containing a small amount of subacute hemorrhage. This measures 9 mm in width and 4.7 cm in length. There is only a thin rind of low-level enhancement surrounding the biopsy clip and the tract that is consistent with enhancement due to  postoperative change. No suspicious above threshold enhancement is identified. There is no MRI evidence of significant residual neoplasm. The patient certainly appears to be a candidate for breast conservation. No discrete mass is identified. Incidental note is made of an 8 mm diameter cyst in the anterior aspect of the right breast at the 3:00 location. There are a few scattered benign-appearing stippled areas of enhancement within the left breast. A small briskly enhancing lymph node is also noted at the 3:00 location within the middle third of the left breast. There is no axillary or internal mammary lymphadenopathy. The chest wall is unremarkable.   BI-RADS 6: Known malignancy.         7/8/2019 Surgery     Right needle-localized partial mastectomy and sentinel lymph node biopsy        1.  Right Breast Needle Localization (44 grams):                A.  RESIDUAL FOCUS OF DUCTAL CARCINOMA IN SITU, 7 MM, CRIBRIFORM TYPE (LOW NUCLEAR                    GRADE) ADJACENT TO THE PREVIOUS BIOPSY SITE.                B.  Negative for residual invasive neoplasm.                C.  All surgical margins of excision are negative for DCIS (closest margin anterior, 1.1 mm).                D.  For receptor studies, see previous pathology report (ER 95%, LA 45%, HER2-Gloria negative, Ki-67 1%).      2.  Right [place] Lymph Node #1:                A.  Single benign lymph node.               B.  Multiple stepped sections are negative for tumor.                 3.  Right [place] Lymph Node #2:                A.  Single benign lymph node.               B.  Multiple stepped sections are negative for tumor.     4.  Right [place] Lymph Node #3:                A.  Single benign lymph node.               B.  Multiple stepped sections are negative for tumor.     5.  Right Breast, Additional Lateral Margin:                A.  Benign breast parenchyma.                B.  Negative for neoplasm.           8/5/2019 - 9/3/2019 Radiation      Radiation OncologyTreatment Course:  Emily Azevedo received 5256 cGy in 21 fractions to right breast.         8/17/2019 -  Hormonal Therapy     Anastrozole (Arimidex) 1 mg by mouth daily            Allergies as of 09/05/2019   • (No Known Allergies)       MEDICATIONS:  I have reviewed the medication list with the patient and I updated it in the electronic medical record. Medication dosages and frequencies were confirmed to be accurate with the patient.      Review of Systems   Constitutional: Negative for activity change, appetite change and fatigue.        Positive for hot flashes     Respiratory: Negative for chest tightness and shortness of breath.    Cardiovascular: Negative for chest pain and leg swelling.   Gastrointestinal: Positive for constipation. Negative for blood in stool and diarrhea.   Genitourinary: Negative for dysuria.   Musculoskeletal: Positive for arthralgias (hand stiffness). Negative for myalgias.   Skin: Negative for color change and wound.        Skin intact at radiation treatment site       DISTRESS QUESTIONNAIRE: 1/10 EFFECT TO LEVEL OF FUNCTIONING: none  Patient identified areas of distress: see flowsheet      /79   Pulse 72   Temp 97.9 °F (36.6 °C) (Oral)   Resp 18   Wt 97.5 kg (215 lb)   LMP  (LMP Unknown)   SpO2 96% Comment: room air  BMI 39.07 kg/m²     Wt Readings from Last 3 Encounters:   09/05/19 97.5 kg (215 lb)   08/26/19 95.9 kg (211 lb 6.7 oz)   08/20/19 95.7 kg (211 lb)       Pain Score    09/05/19 1150   PainSc: 0-No pain         Physical Exam      Problem List Items Addressed This Visit        Active Problems    Malignant neoplasm of lower-inner quadrant of right breast of female, estrogen receptor positive (CMS/HCC) - Primary    Relevant Medications    anastrozole (ARIMIDEX) 1 MG tablet    Other Relevant Orders    Ambulatory Referral to Multi-Disciplinary Clinic            SURVIVORSHIP DISCUSSION HELD TODAY:   Primary patient goal(s): wellness     Management  of disease and treatment related effects: Emily reports completion of radiation on Tuesday. She is feeling well with minimal fatigue. She reports some pink around the radiation treatment site and that her skin remains intact. She reports good tolerance of Anastrozole with occasional hot flashes. We did discuss non-hormonal management of vaginal dryness including water based lubricants without additives for sexual activity and vaginal moisturizers ie Replens or coconut oil.    She is sleeping well. Reports some constipation but may not be adequately hydrating and she will try to increase fluid intake. She is currently followed by lymphedema clinic for surveillance. We discussed while her risk of lymphedema development is low, it is a lifelong concern and I've provided her with some written materials from the [employer] regarding lymphedema causes, signs/symptoms and prevention.        We reviewed surveillance plan after breast cancer includes annual mammography combined with annual clinical breast exam, H&P and continued  Breast self-awareness and that routine total body scans are not recommended as risks of false positives leading to further invasive testing and radiation exposure do not outweigh benefits.     Psychosocial and spiritual: Emily rates her distress 1 today. She reports excellent support from her spouse, adult children, friends and [employer] community. Her youngest son has recently moved to [facility] from [place] and she is pleased to be able to spend time with her two grandchildren ages 2 and 5 months. She is retired and is hoping to capitalize on this free time by beginning to exercise more.       Advance Care Planning   Advance Care Planning Discussion:    Patient does have advance care planning complete but not on file. She has chosen her spouse Mendez as her health care surrogate. I've encouraged her to bring a copy in to her next appointment so that can be included in her medical  records. Brief discussion and written information provided regarding advance care planning and appropriateness for all healthy adults, choosing a healthcare surrogate and upcoming Advance Care Planning classes offered [facility].         Maintaining personal wellness: We discussed current BMI of 39 and recommended target of 20-25 for wellness, cardiovascular health and risk reduction for cancer recurrence and prevention. We discussed availability of oncology registered dietician, Roberta Uribe and referral offered. Patient agrees at this time. Roberta's contact info and handout describing recommended dietary modifications emphasizing whole foods, plant based diet provided and referral placed. We also discussed opportunities for exercise available through the [employer] - distance to travel may be a barrier for participation. She does have a [employer] near her that she has considered joining. We discussed starting with 10 minutes of walking at least 3 times a week and gradually increasing intensity and duration to avoid injury and to establish new habits. We discussed that in addition to calcium supplementation, weight-bearing exercise will be the primary strategy for preservation of bone mass at this time.                 Discussed NCCN recommendations for all cancer survivors of 150 minutes/week moderate intensity exercise, achieve and maintain a healthy BMI, plants-based whole-foods diet, avoid tobacco and second hand smoke, avoid alcohol or minimize alcohol intake - no more than 1 drink in a day for women, 2 drinks in a day for men.     Orders Placed This Encounter   Procedures   • Ambulatory Referral to Multi-Disciplinary Clinic     Referral Priority:   Routine     Referral Type:   Consultation     Referral Reason:   Specialty Services Required     Referred to Provider:   Roberta Uribe, RD, LD     Number of Visits Requested:   1       After review of the  Survivorship Treatment Summary & Care Plan, the patient verbalized understanding of recommendations for follow-up. As outlined in the care plan, they were advised to continue with follow-up care in accordance with the NCCN surveillance guidelines while transitioning back to their primary care physician for continued general preventive and healthcare needs. We discussed the importance of healthy eating, exercise and weight management. We reviewed current guidelines for routine screening of other cancers.     A copy of the Survivorship Treatment Summary & Care Plan for Ms. Azevedo (see below) was provided to and forwarded to the providers identified on the care team.      Greater than 40 minutes spent with patient, more than half of that spent face-to-face counseling patient extensively on treatment summary, surveillance for recurrence, late and long-term effects of disease and treatment, intimacy and self-image, preventative screening for other cancers, achieving/maintaining healthy BMI and link to risk reduction, adherence to current therapies, management of anxiety/uncertainty and self care strategies.         Breast Cancer Survivorship Plan      General Information   Patient name Emily Azevedo   Date of birth 1960    Phone Home Phone [phone]   Mobile [phone]        Email [email or web address]      Cancer Treatment Team     Patient Care Team:  Evelin Ayala MD as Referring Physician (Breast Surgery)  Maddie Blake MD as Consulting Physician (Radiation Oncology)  Reshma Morales MD as Consulting Physician (Hematology and Oncology)    Provider Phone numbers  Care Team Provider: Evelin Ayala MD,  ([phone])  Care Team Provider: Maddie Blake MD,  ([phone])  Care Team Provider: Reshma Morales MD,  ([phone])  Care Team Provider: George Strange MD,  ([phone])  Care Team Provider: Kalyani Jimenez MD,  ([phone])     Post Treatment Care Team    Primary Care Physician Gopal Cheng MD  [address] [place] [id number]   [phone]         Background Information   Medical history Past Medical History:   • Arthritis   • Carpal tunnel syndrome   • Disease of thyroid gland    Hypothyroidism   • Graves disease   • Hearing loss    earing aids    • History of urinary tract infection   • Hypertension   • Malignant neoplasm of lower-inner quadrant of right breast of female, estrogen receptor positive (CMS/HCC)   • Palpitations    Dr. Cheng PMD Benign    • Seasonal allergies   • Vertigo        Surgical history Past Surgical History:   • BREAST BIOPSY    MALIGNANT   • BREAST BIOPSY    BENIGN   • BREAST LUMPECTOMY    Procedure: BREAST LUMPECTOMY WITH SENTINEL NODE BIOPSY AND NEEDLE LOCALIZATION;  Surgeon: Evelin Ayala MD;  Location: [facility];  Service: General   • CARPAL TUNNEL RELEASE   • CERVICAL CONE BIOPSY   • CHOLECYSTECTOMY   • COLONOSCOPY   • GALLBLADDER SURGERY   • HYSTERECTOMY   • LAPAROSCOPIC TUBAL LIGATION   • OOPHORECTOMY   • OH TOTAL ABDOM HYSTERECTOMY    Procedure: TOTAL ABDOMINAL HYSTERECTOMY W/ BSO ;  Surgeon: George Strange MD;  Location: [facility];  Service: Obstetrics/Gynecology   • ROTATOR CUFF REPAIR   • TONSILLECTOMY        Tobacco use Social History     Tobacco Use   Smoking Status Never Smoker   Smokeless Tobacco Never Used        Family oncology history Cancer-related family history includes Lymphoma (age of onset: 16) in her nephew; Lymphoma (age of onset: 30) in her paternal uncle. There is no history of Breast cancer.      Oncology Information      Malignant neoplasm of lower-inner quadrant of right breast of female, estrogen receptor positive (CMS/HCC)    6/2/2019 Initial Diagnosis     Malignant neoplasm of lower-inner quadrant of right breast of female, estrogen receptor positive (CMS/HCC)           6/3/2019 Imaging     Right Diagnostic MMG & Right Breast US ([facility]):    MMG:  Small focal irregular  opacity is again noted along the 3:00 axis in the deep portion of the right breast about 9 cm from the nipple measuring about 8 mm. Linear opacities surround the lesion in a radial pattern. The morphology is concerning, particularly on tomogram images today.   US:  High-resolution grayscale ultrasound imaging directed to this portion of the breast today does show a subtle focus of acoustic shadowing, although mass is not clearly delineated.   BI-RADS 4: Suspicious.         6/13/2019 Biopsy     Right breast, Stereotactic biopsy ([facility]):    1. Breast, Right Deep Medial 3 O'clock Position, Core Biopsy: Breast parenchyma with               A.  Minimally represented invasive carcinoma of no special type (ductal) measuring 2.8 mm maximally                     on the slide, Meghan histologic grade I (tubules = 1, nuclei = 1, mitoses = 1), with associated ductal                     carcinoma in situ (DCIS) with cribriform and micropapillary architecture and low nuclear grade.    ER+ (%, strong)  MA+ (41-50%, moderate)  HER2 negative (IHC 1+)   Ki-67 1%         6/20/2019 Imaging     Bilateral Breast MRI ( [facility]):  The mammogram showed a very tiny cluster of microcalcifications in the lower inner quadrant of the right breast that were biopsied under stereotactic guidance. The biopsy marking clip is identified in the lower inner quadrant at approximately the 4:00 location. It is contained within the inferior aspect of a tubular shaped biopsy tract containing a small amount of subacute hemorrhage. This measures 9 mm in width and 4.7 cm in length. There is only a thin rind of low-level enhancement surrounding the biopsy clip and the tract that is consistent with enhancement due to postoperative change. No suspicious above threshold enhancement is identified. There is no MRI evidence of significant residual neoplasm. The patient certainly appears to be a candidate for breast conservation. No discrete mass is  identified. Incidental note is made of an 8 mm diameter cyst in the anterior aspect of the right breast at the 3:00 location. There are a few scattered benign-appearing stippled areas of enhancement within the left breast. A small briskly enhancing lymph node is also noted at the 3:00 location within the middle third of the left breast. There is no axillary or internal mammary lymphadenopathy. The chest wall is unremarkable.   BI-RADS 6: Known malignancy.         7/8/2019 Surgery     Right needle-localized partial mastectomy and sentinel lymph node biopsy        1.  Right Breast Needle Localization (44 grams):                A.  RESIDUAL FOCUS OF DUCTAL CARCINOMA IN SITU, 7 MM, CRIBRIFORM TYPE (LOW NUCLEAR                    GRADE) ADJACENT TO THE PREVIOUS BIOPSY SITE.                B.  Negative for residual invasive neoplasm.                C.  All surgical margins of excision are negative for DCIS (closest margin anterior, 1.1 mm).                D.  For receptor studies, see previous pathology report (ER 95%, W[place] 45%, HER2-Gloria negative, Ki-67 1%).      2.  Right [place] Lymph Node #1:                A.  Single benign lymph node.               B.  Multiple stepped sections are negative for tumor.                 3.  Right [place] Lymph Node #2:                A.  Single benign lymph node.               B.  Multiple stepped sections are negative for tumor.     4.  Right [place] Lymph Node #3:                A.  Single benign lymph node.               B.  Multiple stepped sections are negative for tumor.     5.  Right Breast, Additional Lateral Margin:                A.  Benign breast parenchyma.                B.  Negative for neoplasm.             7/30/2019 -  Hormonal Therapy     Anastrozole (Arimidex) 1 mg by mouth daily           8/5/2019 -  Radiation     Radiation OncologyTreatment Course:  Emily Azevedo received 5256 cGy in 21 fractions to right breast.                  Complications during Therapy:   No  concerns stated   Modification to Treatment Plan:  No Modifications      Lifetime Dose Tracking:   No doses have been documented on this patient for the following tracked chemicals: Doxorubicin, Epirubicin, Idarubicin, Daunorubicin, Mitoxantrone, Bleomycin, Mitomycin, Doxorubicin Liposomal             [No treatment plan]         Persistent Treatment-Associated Adverse Effects at Completion of Therapy   It is important to recognize that not every person experiences the following adverse events after treatment.  You may not have any of these issues, a few or many adverse effects.  Experiences are highly variable.  Please discuss any adverse effects of cancer treatment with your cancer care team.      After Surgical Therapy   Breast Conserving Surgery (Lumpectomy)     Breast conserving surgery (lumpectomy) involves the removal of the breast mass (cancer lump) and a surrounding area of normal tissue. After surgery, there may be pain and soreness in the chest, underarm or shoulder which should get better over time. Nerves may be damaged in the breast and areas of lymph node removal which may result in numbness and other changes in sensation. Ask your doctor or nurse if you have issues with pain, numbness or tingling, or any changes in function or mobility.      Breast conserving surgery allows women to keep their breast but the breast may look different than it did before surgery. The breast may be smaller and may be different in size and shape. There will be a scar from the surgery and scar tissue may feel different. Radiation therapy can also affect the way the breast looks and feels. How you think and feel about your body is important and coping with changes after breast surgery takes time.  It's important to look at your scar, which should become less red and swollen over time. It's important to touch your scar, too.  Your physician may give you instructions about massaging the scar to help with healing and to soften  scar tissue. If you have a partner, let your partner look at and feel the scar when you're ready.  Working through feelings about the cancer and changes as a result of surgery may take time and support. Talk with your doctor or nurse about any issues with body image and coping.      Survivors of breast cancer should speak with their health care provider regarding the possibility of a genetic or family syndrome. If there does appear to be a family history or possible genetic syndrome, genetic counseling and testing may be recommended.      Onyx Node Biopsy   Removal of the sentinel lymph node is the removal of the first lymph node to which cancer cells are most likely to spread. After surgery, there may be pain and soreness in the area where the node was removed.  Nerves may be damaged which may result in numbness or other changes in sensation. While sentinel node biopsy (as opposed to a lymph node dissection where more lymph nodes are removed) decreases the risk of developing lymphedema, the risk is not completely gone.     Lymphedema   Removal of lymph nodes can slow the normal flow of lymph in the area which can lead to swelling in that limb, also called lymphedema.  Survivors who also received radiation therapy to the area where a lymph node was removed may be at increased risk of developing lymphedema. In some cases, the lymphedema can occur years after cancer therapy was completed. Lymphedema can cause pain or discomfort, disfigurement, change in function, and increased risk of infection in the affected area and closest limb. Signs of lymphedema can include a feeling of fullness or heaviness, changes in the skin (red, thick, stiff), aching, tightness, and difficulty moving or flexing nearby joints.  Other signs could be that your jewelry or clothes, like socks, pants or sleeves, may begin to feel tight on the affected limb. As signs of lymphedema could develop months or years after treatment, continue to  monitor for signs and notify your doctor.      Several steps can be taken to help prevent and control lymphedema. Survivors should protect the potentially affected limb by avoiding cuts, scrapes, burns, insect bites, shots/vaccines, blood draws, and IV sticks in order to decrease the risk of developing an infection in the limb. In addition, the survivor should protect the limb from the sun to avoid sunburn.  Lastly, survivors should avoid tight clothing and jewelry, and blood pressures in the affected limb that might further slow the normal flow of lymph.      Survivors of cancer, including those at risk for lymphedema, can and should exercise. Survivors should start slow and gradually increase intensity while monitoring your limb for changes in swelling or redness. If either occurs, stop exercising and notify your doctor for further direction.            After Chemotherapy   No chemotherapy received.      After Radiation Therapy   Radiation therapy can cause early and late side effects.  Early side effects are those that happen during or shortly after treatment and are usually gone within a few weeks after treatment ends.  Late side effects may take months or years to develop and vary depending on the areas of the body included in the field of radiation and the radiation techniques that were used.      The most common early side effects are fatigue (feeling tired) and skin changes.  Other early side effects are usually related to the area being treated. If you continue to have some skin problems after treatment ends, be gentle with the skin in the treatment area until all signs of irritation are gone and continue to care for your skin as your doctors and nurses have advised. If you continue to have fatigue, you may need to plan your activities to maximize energy, get extra rest while your body is still recovering and follow other instructions from your doctors and nurses such as exercise and activity.    Long term  effects of radiation therapy vary greatly depending on the areas included in the field of radiation and the radiation techniques that were used. Breast tissue exposed to radiation may become more firm over time and you may notice changes in the size and shape of the breast. The skin where the breast was treated may have a different coloration, be more red or appear tanned. If the lymph nodes under the arm (axillary nodes) were in the radiation field, there may be an increased risk of developing lymphedema.  Lymphedema is a condition in which fluid collects in the arm or other areas such as the hand, fingers, chest or back and cause swelling. In rare cases, radiation therapy can increase the risk of a second cancer. Ask your doctors and nurses about the risk of long term effects. Keep your follow up appointments and keep up with recommended health screenings        Hormone Therapy    Hormones, like estrogen and progesterone, are naturally produced in the body. Typically these hormones help our body function in various ways, however, in some cases these hormones can also cause cancer to grow.  When your cancer is positive for hormone receptors, your doctor may recommend hormone therapy. Hormone therapy works by either blocking the effects of the hormone on the cancer cell, or by reducing the amount of hormone produced by the body.  In doing so, the cancer cells no longer receive or use the hormone to grow.      It is very important for you to take the medicine as prescribed by your doctor and keep your regular follow-up appointments. Some medications interfere with hormone therapy medications, so be sure and discuss all medications that you take with your doctor.  Common side effects women experience from hormone therapy includes hot flashes, vaginal dryness, and lack of a period in women who have not yet reached menopause.  Some less common but serious side effects of hormone therapy may include increased risk for  blood clots, joint pain, bone loss, weakness, mood changes, nausea, fatigue, and loss of interest in sexual activity, endometrial and uterine cancers, risk for stroke, heart attack, angina, and heart failure. Share with your doctor the side effects you experience so a plan can be made to minimize the effects. In addition, these medications will be taken long-term, in some cases 5-10 years. When taking oral hormone therapy, it may be helpful to create a calendar, use a pillbox, or set an alarm on your watch or phone to remind you daily to take the medication.      Care of your Venous Access Device   No Venous Access Device currently in place      General After Cancer Treatment        It is not uncommon for cancer to impact other areas of your life such as relationships, work and mental health.  If you develop financial concerns, resources are sometimes available to assist in these areas.  Depression and anxiety can present either during or after cancer diagnosis and treatment.  It is important to discuss with your physician any of these concerns so these resources can be made available to you.      General Cancer Support & Resources    [facility]    Cancer Resource Center & Multidisciplinary Clinic:  [address] [place] [address]  [phone]    Survivorship Clinical Nurse Specialist  Maddie Degroot [facility] - [phone]    Oncology Social Worker:  Karli Nuñez [facility] - [phone]     Psychiatric Nurse Practitioner:  Lily Raymond [facility] - [phone]    Financial Counselor and Contact Information:  [facility] - [phone]    Oncology Dietician Contact Information:  Roberta Uribe  - [phone]     Common Concerns After Treatment    Managing Anxiety or Depression:  Referral to support group, e.g. American Cancer Society (www.cancer.org, 931.328.5302), Cancer Support Community (www.wellnessandcancer.org, 239.297.9527)    Referral to a community  provider for cognitive behavioral therapy or counseling.    Psychiatric care or counseling and/or initiation of pharmacotherapy are available at the [facility] . Please call [phone] or talk to a member of your treatment team about a referral.    For anonymous information, resource requests or support you can also call the 24-hour Crisis and Information Center at [phone]      Fear of Cancer Coming Back:  Patients need to know that these feelings are normal, and they won’t cause the cancer to come back.    • Referral for cognitive behavioral therapy or counseling    • Referral to support group, e.g. American Cancer Society (www.cancer.org, 886.906.5184), Cancer Support Community (www.wellnessandcancer.org, 627.726.8355)      Fatigue:  Fatigue is one of the most common problems in patients with cancer. It may be related to the disease itself or cancer treatment and may continue beyond completion of treatment among long-term cancer survivors. Among people with cancer, 80% to 100% report fatigue.     Fatigue may be an isolated problem or occur as one part of a cluster of symptoms, such as pain, depression, dyspnea, anorexia, and sleep problems.    If you are taking an immunotherapy, fatigue may be a symptom of an endocrine disorder secondary to immunotherapy, such as thyroid or pituitary disorder, not just general cancer-related fatigue. It is important to report any changes to your doctor (ONS, 2017)    Regular physical activity (goal of 150 minutes of activity per week) is the most important thing you can do to manage fatigue. Other treatments that are likely to be helpful include:    · Progressive muscle relaxation and/or relaxation breathing with or without imagery or distraction  · Cognitive behavioral therapy for sleep  · Yoga  · Meditation, mindfulness based stress reduction, and cognitive behavioral stress management  · Cognitive behavioral therapy for  fatigue, depression, and pain with or without hypnosis  · Evaluation for other causes of fatigue including hypothyroidism, anemia and depression      Financial Concerns:  The financial challenges that people with cancer and their families face are very real.     For hospital bills, you may want to talk with a hospital financial counselor. You may be able to work out a monthly payment plan or even get a reduced rate. You may also want to stay in touch with your insurance company to make sure costs are covered.    For information about resources that are available you can go to the NCI database, “Organizations That Offer Support Services,” at http://www.cancer.gov, search terms “financial assistance.”     Or call the [facility] toll-free 3-678-3-CANCER (1-249.802.9841) to ask for help.      Managing Hot Flashes:  · Avoiding sugar may reduce night sweats  · New research studies suggest acupuncture may be helpful in control of hot flashes  · Regular meditation calms the adrenal glands and may reduce hot flashes and night sweats  · Ask your doctor about appropriate medical treatments. Medicines that may help manage hot flashes include Venlafaxine (Effexor) 37.5mg up to 75 mg daily OR Gabapentin (Neurontin) 300mg at bedtime up to 3 times/day.         Managing Insomnia  · Mindfulness based stress reduction  · Practice good sleep hygiene (reduce/eliminate TV and electronic device screen time one hour before bed)  · Avoid stimulants like caffeine and nicotine close to bedtime  · Avoid alcohol close to bedtime. While alcohol is well-known to help you fall asleep faster, too much close to bedtime can disrupt sleep in the second half of the night as the body begins to process the alcohol.     · Evaluation and management of other symptoms (hot flashes, anxiety, and pain)  · Regular physical activity (goal of 150 minutes of activity per week)  · The supplement melatonin may help with sleep disruption    More information at  www.sleepfoundation.org      Concerns about Sexuality, Intimacy and Body Image  · Ask your treatment team about referrals to counseling and/or support groups to address body image concerns.  · Non-hormonal remedies for vaginal dryness for sexual activity include water-based vaginal lubricants (Astroglide, [place] Jelly). Avoid products with spermicides or additives as they can be irritating.  · Some people use a non-hormonal vaginal moisturizer 1-3 times per week to improve general comfort (Replens, coconut oil).  · Discuss pain with intercourse or sexual activity with your doctor.    Look Good Feel Better is a non-medical, brand-neutral public service program that teaches beauty techniques to people with cancer to help them manage the appearance-related side effects of cancer treatment. The program includes lessons on skin and nail care, cosmetics, wigs and turbans, accessories and styling, helping people with cancer to find some normalcy in a life that is by no means normal.    For more information and helpful videos visit:  http://lookgoodfeelbetter.org/       About Lymphedema:  According to the National Cancer [place], anywhere from 5-17% of women who have sentinel lymph node biopsy develops lymphedema. Among women who have axillary lymph node dissection, the percentage is higher - from 20-53% - and risk increases with the number of nodes taken out.     · Referral to lymphedema physical therapy specialist for lymphatic massage or proper fitting of compression garments or bandages  · Weight training and regular exercise  · Achieve and maintain a healthy weight    Tips to reduce the risk of injury or infection to the arm:  · Treat infections of the at-risk arm and hand right away.  · Wear gloves when doing house or garden work.  · Keep skin clean and well-moisturized.  · Use the arm not at risk when having blood drawn, getting injections or having blood pressure taken.  · Avoid sunburn and excess heat from saunas,  "hot baths, tanning and other sources.  · Don't cut nail cuticles.  · Use insect repellant when outdoors.  · Avoid injuries, including scratches and bruises, to the at-risk arm.  · Rest the at-risk arm in an elevated position (above the heart or shoulder).     If you have an infection, injury or any of the symptoms listed above, see your health care provider.        Changes in Memory or \"Brain Fog\"  Patients should know that 25% of cancer patients have cognitive dysfunction (changes in thinking or memory) after treatment and it usually gets better over time    Some things you can do to manage \"brain fog\" or memory problems include:  · Mental exercises (e.g., word games, crossword puzzles)   · Setting up and following routines  · Repeating information  · Keeping a memory journal  · Avoiding multitasking  · Exercising  · Maintaining a healthy diet.   · There is some evidence that Group Cognitive Training is also effective  · Rule out depression, sleep disturbance      Maintain a Healthy Weight:  Aim for a target BMI of 20-25 m2  (Body mass index is 38.41 kg/m².)    An oncology specialized registered dietician is available at the [facility] for consultations to you. Please call [phone]      Prevention and Wellness:  · Achieve and maintain a healthy body weight as weight gain is a risk factor for development or recurrence of some cancers (BMI between 20-25m2).  · Current Body mass index is 38.41 kg/m².  · Regular physical activity (preferably daily). Strive for at least 150 minutes of moderate or 75 minutes of vigorous activity per week.  · Maintain a diet high in vegetables, fruits and whole grains and low in sugars and fats. Limit red meat and avoid processed foods.  · Avoid all tobacco and second hand smoke.  · Avoid all alcohol intake.  · If you do drink, minimize alcohol exposure - no more than one drink in a day for women and two drinks in a day for men.  · Continue all standard non-cancer " related healthcare with your primary care provider. Any new, unusual, and/or persistent symptoms should be brought to the attention of your provider.            Surveillance    How Frequent?    Medical Oncology visits 1 - 4 times per year as clinically appropriate for 5 years, then annually      Lab tests As directed    Imaging exams      Mammography Annual clinical breast exam needed    Schedule a mammogram one year after the first mammogram that led to diagnosis, but no earlier than six months after radiation therapy. Obtain a mammogram every six to 12 months thereafter.    Lymphedema Monitor for lymphedema    Genetic Counseling Periodic screening for changes in family history and referral to genetic counseling as indicated    Gynecologic assessment Pap smear: Beginning at age 30, every three years if the last three PAP tests in a row were normal. Every year if the last PAP was abnormal. every 3 years as long as the last 3 were normal, every year if abnormal.     Women age 70 and older who have had 3 or more normal PAP tests in a row, and no abnormal PAP tests results in the last 10 years, may choose to stop having PAP tests.    Pelvic exam:  Continue to visit a gynecologist annually.      Bone Density study For women on aromatase inhibitor or who experience ovarian failure secondary to treatment should have monitoring of bone health and bone mineral density determination at baseline and periodically thereafter    Bone densitometry (DEXA scan) every 1 to 2 years after initiation of aromatase inhibitor (if applicable) and/or at age 65 or older.    Calcium and Vitamin D.    Weight-bearing exercise most days of the week e.g. moderate intensity walking (can have a conversation but cannot sing).    Avoid all smoke and second hand smoke.     Bisphosphonate, if indicated by your doctor based on bone densitometry (DEXA scan) results.    Last DEXA scan of 2017 showed osteopenia    Immunizations       Influenza       Herpes  Zoster       Pneumococcal Yearly  Once  As appropriate    Tobacco Cessation The patient is not currently a tobacco user.  Counseling given: Not Answered         Monitor for ongoing toxicities:   None Specified      Call your doctor if you have any of these signs or symptoms   New or worsening symptoms.  Pain that is new, unrelieved or bothersome.  Difficulty with your emotions, anxiety, and/or depression.  Physical problems that affect your abilities in your daily life or those that are bothersome (for example, continued fatigue, trouble sleeping, sexual problems, or edema).      Referrals provided   There are no referral needs at this time.

## 2019-09-12 ENCOUNTER — APPOINTMENT (OUTPATIENT)
Dept: OTHER | Facility: HOSPITAL | Age: 59
End: 2019-09-12

## 2019-09-12 ENCOUNTER — DOCUMENTATION (OUTPATIENT)
Dept: NUTRITION | Facility: HOSPITAL | Age: 59
End: 2019-09-12

## 2019-09-12 NOTE — PROGRESS NOTES
"Oncology Nutrition     Patient Name:  Emily Azevedo  YOB: 1960  MRN: [id number]  Date:  09/12/19  Physician:  Referred by Maddie Degroot    Type of Cancer Treatment:   Surgery:   Type:  Lumpectomy, SNBx  Radiation/Cyberknife: completed    Patient Active Problem List   Diagnosis   • Postmenopausal bleeding   • Malignant neoplasm of lower-inner quadrant of right breast of female, estrogen receptor positive (CMS/HCC)       Current Outpatient Medications   Medication Sig Dispense Refill   • anastrozole (ARIMIDEX) 1 MG tablet Take 1 mg by mouth Daily.     • celecoxib (CeleBREX) 200 MG capsule Take 200 mg by mouth Daily. PATIENT TO CONTACT DR. TRA NAILS REGARDING HOLDING PRIOR TO SURGERY     • cetirizine (ZyrTEC) 10 MG tablet Take 10 mg by mouth daily.     • diclofenac (VOLTAREN) 1 % gel gel Apply 4 g topically to the appropriate area as directed 4 (Four) Times a Day As Needed. HOLD PRIOR TO SURGERY     • DULoxetine (CYMBALTA) 60 MG capsule Take 60 mg by mouth daily. LAST DOSE 7/17/16     • levothyroxine (SYNTHROID, LEVOTHROID) 100 MCG tablet Take 100 mcg by mouth daily.     • losartan-hydrochlorothiazide (HYZAAR) 100-12.5 MG per tablet Take 1 tablet by mouth Every Morning.     • SAXENDA 18 MG/3ML solution pen-injector        No current facility-administered medications for this visit.        Glycemic Risk:   n/a    Weight:   Height: 62\"  Weight: 215 lbs.    BMI: 39.1  Weight has been stable    Oral Food Intake:  Regular Diet - No Restrictions    Activity:   Normal with no limitations     reports that she has never smoked. She has never used smokeless tobacco. She reports that she does not drink alcohol or use drugs.    Evaluation of Nutritional Risk:   Patient identified to be at nutritional risk and/or for nutritional consultation; will follow patient through course of treatment.   Patient Education    Notes: Met with patient today in the Cancer Resource Center. Reviewed breast cancer nutrition " recommendations with emphasis on a plant based diet. Provided her with meal planning tools, sample menus, meal ideas, grocery list. Encouraged activity 30 minutes per day. Answered questions she had about vitamin/mineral supplements, organic foods and soy.   Will be available for any questions.  Goal is 1-2# weight loss per week.     Electronically signed by:  Roberta Uribe RD, LD  11:14 AM

## 2019-09-30 ENCOUNTER — HOSPITAL ENCOUNTER (OUTPATIENT)
Dept: PHYSICAL THERAPY | Facility: HOSPITAL | Age: 59
Setting detail: THERAPIES SERIES
Discharge: HOME | End: 2019-09-30

## 2019-09-30 ENCOUNTER — LAB (OUTPATIENT)
Dept: LAB | Facility: HOSPITAL | Age: 59
End: 2019-09-30

## 2019-09-30 ENCOUNTER — OFFICE VISIT (OUTPATIENT)
Dept: ONCOLOGY | Facility: CLINIC | Age: 59
End: 2019-09-30

## 2019-09-30 VITALS
DIASTOLIC BLOOD PRESSURE: 73 MMHG | HEART RATE: 75 BPM | OXYGEN SATURATION: 94 % | BODY MASS INDEX: 40.63 KG/M2 | TEMPERATURE: 98.4 F | WEIGHT: 220.8 LBS | HEIGHT: 62 IN | SYSTOLIC BLOOD PRESSURE: 127 MMHG | RESPIRATION RATE: 18 BRPM

## 2019-09-30 DIAGNOSIS — C50.311 MALIGNANT NEOPLASM OF LOWER-INNER QUADRANT OF RIGHT BREAST OF FEMALE, ESTROGEN RECEPTOR POSITIVE (HCC): Primary | ICD-10-CM

## 2019-09-30 DIAGNOSIS — Z91.89 AT RISK FOR LYMPHEDEMA: ICD-10-CM

## 2019-09-30 DIAGNOSIS — N95.0 POSTMENOPAUSAL BLEEDING: ICD-10-CM

## 2019-09-30 DIAGNOSIS — M25.50 ARTHRALGIA, UNSPECIFIED JOINT: ICD-10-CM

## 2019-09-30 DIAGNOSIS — Z17.0 MALIGNANT NEOPLASM OF LOWER-INNER QUADRANT OF RIGHT BREAST OF FEMALE, ESTROGEN RECEPTOR POSITIVE (HCC): Primary | ICD-10-CM

## 2019-09-30 DIAGNOSIS — R63.5 WEIGHT GAIN: ICD-10-CM

## 2019-09-30 DIAGNOSIS — C50.919 MALIGNANT NEOPLASM OF FEMALE BREAST, UNSPECIFIED ESTROGEN RECEPTOR STATUS, UNSPECIFIED LATERALITY, UNSPECIFIED SITE OF BREAST (HCC): ICD-10-CM

## 2019-09-30 DIAGNOSIS — Z90.11 S/P PARTIAL MASTECTOMY, RIGHT: ICD-10-CM

## 2019-09-30 LAB
ALBUMIN SERPL-MCNC: 4.2 G/DL (ref 3.5–5.2)
ALBUMIN/GLOB SERPL: 1.4 G/DL (ref 1.1–2.4)
ALP SERPL-CCNC: 88 U/L (ref 38–116)
ALT SERPL W P-5'-P-CCNC: 28 U/L (ref 0–33)
ANION GAP SERPL CALCULATED.3IONS-SCNC: 12.5 MMOL/L (ref 5–15)
AST SERPL-CCNC: 32 U/L (ref 0–32)
BASOPHILS # BLD AUTO: 0.03 10*3/MM3 (ref 0–0.2)
BASOPHILS NFR BLD AUTO: 0.5 % (ref 0–1.5)
BILIRUB SERPL-MCNC: 0.4 MG/DL (ref 0.2–1.2)
BUN BLD-MCNC: 20 MG/DL (ref 6–20)
BUN/CREAT SERPL: 26.7 (ref 7.3–30)
CALCIUM SPEC-SCNC: 9.8 MG/DL (ref 8.5–10.2)
CHLORIDE SERPL-SCNC: 102 MMOL/L (ref 98–107)
CO2 SERPL-SCNC: 28.5 MMOL/L (ref 22–29)
CREAT BLD-MCNC: 0.75 MG/DL (ref 0.6–1.1)
DEPRECATED RDW RBC AUTO: 48.9 FL (ref 37–54)
EOSINOPHIL # BLD AUTO: 0.34 10*3/MM3 (ref 0–0.4)
EOSINOPHIL NFR BLD AUTO: 5.5 % (ref 0.3–6.2)
ERYTHROCYTE [DISTWIDTH] IN BLOOD BY AUTOMATED COUNT: 13.8 % (ref 12.3–15.4)
GFR SERPL CREATININE-BSD FRML MDRD: 79 ML/MIN/1.73
GLOBULIN UR ELPH-MCNC: 3.1 GM/DL (ref 1.8–3.5)
GLUCOSE BLD-MCNC: 77 MG/DL (ref 74–124)
HCT VFR BLD AUTO: 42.6 % (ref 34–46.6)
HGB BLD-MCNC: 13.8 G/DL (ref 12–15.9)
IMM GRANULOCYTES # BLD AUTO: 0.02 10*3/MM3 (ref 0–0.05)
IMM GRANULOCYTES NFR BLD AUTO: 0.3 % (ref 0–0.5)
LYMPHOCYTES # BLD AUTO: 1.48 10*3/MM3 (ref 0.7–3.1)
LYMPHOCYTES NFR BLD AUTO: 23.9 % (ref 19.6–45.3)
MCH RBC QN AUTO: 31 PG (ref 26.6–33)
MCHC RBC AUTO-ENTMCNC: 32.4 G/DL (ref 31.5–35.7)
MCV RBC AUTO: 95.7 FL (ref 79–97)
MONOCYTES # BLD AUTO: 0.54 10*3/MM3 (ref 0.1–0.9)
MONOCYTES NFR BLD AUTO: 8.7 % (ref 5–12)
NEUTROPHILS # BLD AUTO: 3.78 10*3/MM3 (ref 1.7–7)
NEUTROPHILS NFR BLD AUTO: 61.1 % (ref 42.7–76)
NRBC BLD AUTO-RTO: 0 /100 WBC (ref 0–0.2)
PLATELET # BLD AUTO: 209 10*3/MM3 (ref 140–450)
PMV BLD AUTO: 10.5 FL (ref 6–12)
POTASSIUM BLD-SCNC: 4.4 MMOL/L (ref 3.5–4.7)
PROT SERPL-MCNC: 7.3 G/DL (ref 6.3–8)
RBC # BLD AUTO: 4.45 10*6/MM3 (ref 3.77–5.28)
SODIUM BLD-SCNC: 143 MMOL/L (ref 134–145)
WBC NRBC COR # BLD: 6.19 10*3/MM3 (ref 3.4–10.8)

## 2019-09-30 PROCEDURE — G0463 HOSPITAL OUTPT CLINIC VISIT: HCPCS | Performed by: INTERNAL MEDICINE

## 2019-09-30 PROCEDURE — 85025 COMPLETE CBC W/AUTO DIFF WBC: CPT | Performed by: INTERNAL MEDICINE

## 2019-09-30 PROCEDURE — 36415 COLL VENOUS BLD VENIPUNCTURE: CPT | Performed by: INTERNAL MEDICINE

## 2019-09-30 PROCEDURE — 80053 COMPREHEN METABOLIC PANEL: CPT | Performed by: INTERNAL MEDICINE

## 2019-09-30 PROCEDURE — 97110 THERAPEUTIC EXERCISES: CPT | Performed by: PHYSICAL THERAPIST

## 2019-09-30 PROCEDURE — 93702 BIS XTRACELL FLUID ANALYSIS: CPT | Performed by: PHYSICAL THERAPIST

## 2019-09-30 PROCEDURE — 99214 OFFICE O/P EST MOD 30 MIN: CPT | Performed by: INTERNAL MEDICINE

## 2019-09-30 RX ORDER — ANASTROZOLE 1 MG/1
1 TABLET ORAL DAILY
Qty: 90 TABLET | Refills: 2 | Status: SHIPPED | OUTPATIENT
Start: 2019-09-30 | End: 2020-07-27

## 2019-09-30 NOTE — PROGRESS NOTES
Subjective     REASON FOR FOLLOW UP: Newly diagnosed right breast cancer  · 08/17/19 - started Arimidiex                                HISTORY OF PRESENT ILLNESS:  The patient is a 59 y.o. year old female who is here for an opinion about the above issue.    History of Present Illness patient is a 59-year-old female with history of newly diagnosed right breast cancer.     Patient reports gaining about 20 pounds since June 2019.  She takes thyroid medicine, and it is under control and supervision.  She has not been exercising.  She has not been dieting, although she has gone to a nutritionist.  She now has joint pain as well, which she rates as 4-5/10.    She started Arimidiex 08/17/19.       ONCOLOGIC HISTORY  Back in October 2019 she had a mammogram which showed an abnormality in the right breast, which showed a new irregular nodular opacity in the deep medial right breast near the chest wall measuring over 1 cm in size.  She had circumscribed benign appearing nodule in the medial right breast near the nipple which is also new.  Likely thought to be a cyst.  Diagnostic mammogram was suggested.  Patient then underwent diagnostic mammogram November 7, 2018 and was thought to represent fibroglandular tissue.  Ultrasound showed no abnormality in the medial right breast that was seen on screening mammogram.  However a six-month follow-up suggested.    Repeat diagnostic mammogram Sabra 3, 2019 was abnormal and biopsy confirmed invasive ductal carcinoma, grade 1, ER CA positive HER-2/berenice negative.  Subsequently patient underwent lumpectomy on July 8, 2019 with sentinel lymph node biopsy.    9/19/2018: Bilateral screening mammogram  IMPRESSION:  1. New irregular nodular opacity in the deep medial right breast.  Suspicious imaging features. Recall for additional diagnostic imaging is  recommended.  2. Circumscribed benign-appearing nodule in the medial right breast near  the nipple is also new. Ultrasound imaging of this  nodule is recommended  to evaluate for a probable cyst or similar benign nodule.    November 7, 2018: Right diagnostic mammogram  MAMMOGRAM FINDINGS:  Small focal ill-defined opacity is again noted within the deep medial  right breast along the 3:00 axis. This is visible in retrospect on each  of the prior studies, and may therefore potentially represent focally  prominent normal fibroglandular tissue. Ultrasound imaging was then  Performed.    November 7, 2019: Ultrasound of right breast  IMPRESSION:  1. Probably benign examination.  2. Small ill-defined opacity within the deep medial right breast showing  no corresponding abnormality on directed ultrasound imaging today.  3. Follow-up unilateral mammogram in six months is indicated.    Sabra 3, 2019: Right diagnostic mammogram and right breast ultrasound  MAMMOGRAM FINDINGS:  Small focal irregular opacity is again noted along the 3:00 axis in the  deep portion of the right breast about 9 cm from the nipple measuring  about 8 mm. Linear opacities surround the lesion in a radial pattern.  The morphology is concerning, particularly on tomogram images today.     ULTRASOUND FINDINGS:  High-resolution grayscale ultrasound imaging directed to this portion of  the breast today does show a subtle focus of acoustic shadowing,  although mass is not clearly delineated.     IMPRESSION:  1.  Small irregular lesion in the deep medial right breast with  suspicious mammographic features, difficult to identify by ultrasound.  2.  Stereotactic core needle biopsy of the lesion is recommended for  further assessment.    June 13, 2019: Stereotactic biopsy of the right breast medial 3 o'clock position lesion shows invasive carcinoma of no special type, ductal, 2.8 mm, grade 1, Meghan score of 3 with associated DCIS with cribriform and micropapillary, low nuclear grade,    ER: %  MN: 41-50%  HER-2/berenice: 1+ negative  Ki-67 1%    June 21, 2019: MRI breast  bilateral  IMPRESSION:  Small marking clip and postbiopsy changes in the lower inner  quadrant of the right breast as described in more detail above. There is  no MRI evidence of residual neoplasm. There are no suspicious findings  in the left breast.    July 8, 2019: Status post right needle localized partial mastectomy with sentinel lymph node biopsy  Synoptic Checklist    INVASIVE CARCINOMA OF THE BREAST (Breast Invasive - All Specimens)  Resulting Labs  [phone]  CLINICAL  Radiologic Finding Mass or architectural distortion  [facility],  [address] [place] [address]  PATIENT: Emily Azevedo  MRN: [id number] CSN: [id number]  Page: 2 of 4 Printed: 7/10/2019 11:35 AM  Right  .  TUMOR  Tumor Site: Invasive Carcinoma Upper inner quadrant  Clock Position of Tumor Site 3 o'clock  Histologic Type Invasive carcinoma of no special type (ductal, not otherwise  specified)  Histologic Grade (Meghan Histologic Score) No residual invasive carcinoma  Tumor Size Greatest dimension of largest invasive focus in Millimeters (mm): 2.8  mm Millimeters (mm)  Tumor Focality Single focus of invasive carcinoma  Ductal Carcinoma In Situ (DCIS) Present  Negative for extensive intraductal component (EIC)  Size (Extent) of DCIS Estimated size of DCIS greatest dimension in Millimeters (mm) is at  least: 7 mm Millimeters (mm)  Number of Blocks with DCIS 2  Number of Blocks Examined 27  Architectural Patterns Cribriform  Nuclear Grade Grade I (low)  Necrosis Not identified  Lobular Carcinoma In Situ (LCIS) No LCIS in specimen  Tumor Extent  Accessory Findings  Lymphovascular Invasion Not identified  Dermal Lymphovascular Invasion No skin present  Microcalcifications Not identified  Treatment Effect No known presurgical therapy  .  MARGINS  DCIS Margins Uninvolved by DCIS  Distance of DCIS to Anterior Margin in Millimeters (mm) 1.1 Millimeters (mm)  Distance of DCIS to Posterior Margin in  Millimeters (mm) 32 Millimeters (mm)  Distance of DCIS to Superior Margin in Millimeters (mm) 5.5 Millimeters (mm)  Distance of DCIS to Inferior Margin in Millimeters (mm) 5.4 Millimeters (mm)  Distance of DCIS to Medial Margin in Millimeters (mm) 40 Millimeters (mm)  Distance of DCIS to Lateral Margin in Millimeters (mm) 21 Millimeters (mm)  Distance of DCIS from Closest Margin in Millimeters (mm) 1.1 mm Millimeters (mm)  Closest Margin Anterior  .  LYMPH NODES  Regional Lymph Nodes Uninvolved by tumor cells  Number of Lymph Nodes Examined 3  Number of [place] Nodes Examined 3  .  PATHOLOGIC STAGE CLASSIFICATION (pTNM, AJCC 8th Edition)  TNM Descriptors Not applicable  Primary Tumor (Invasive Carcinoma) (pT) pT1a  Regional Lymph Nodes (pN)  Modifier (sn): Only sentinel node(s) evaluated.  Category (pN) pN0  .  . Patient is here to discuss further options of treatment atient is here to discuss further options of treatment.  . Patient is here to discuss further options of treat  Patient is here to discuss further options of treatment.  She has a T1 a N0 ER MT positive HER-2 negative tumor in the right breast.  She has no family history of breast cancer.  Patient is already seen radiation oncology Dr. Maddie Glover who plans to start radiation next week.      Past Medical History:   Diagnosis Date   • Arthritis    • Carpal tunnel syndrome    • Disease of thyroid gland     Hypothyroidism   • Graves disease    • Hearing loss     earing aids    • History of urinary tract infection    • Hypertension    • Malignant neoplasm of lower-inner quadrant of right breast of female, estrogen receptor positive (CMS/HCC) 6/24/2019   • Palpitations     Dr. Cheng PMD Benign    • Seasonal allergies    • Vertigo         Past Surgical History:   Procedure Laterality Date   • BREAST BIOPSY Right     MALIGNANT   • BREAST BIOPSY Right 2013    BENIGN   • BREAST LUMPECTOMY Right 7/8/2019    Procedure: BREAST LUMPECTOMY WITH SENTINEL NODE  BIOPSY AND NEEDLE LOCALIZATION;  Surgeon: Evelin Ayala MD;  Location: [facility] OR [facility] [place];  Service: General   • CARPAL TUNNEL RELEASE Right    • CERVICAL CONE BIOPSY     • CHOLECYSTECTOMY  1983   • COLONOSCOPY     • GALLBLADDER SURGERY     • HYSTERECTOMY  2016   • LAPAROSCOPIC TUBAL LIGATION     • OOPHORECTOMY     • NE TOTAL ABDOM HYSTERECTOMY Bilateral 7/28/2016    Procedure: TOTAL ABDOMINAL HYSTERECTOMY W/ BSO ;  Surgeon: George Strange MD;  Location: [facility] OR;  Service: Obstetrics/Gynecology   • ROTATOR CUFF REPAIR Right    • TONSILLECTOMY  1963        Current Outpatient Medications on File Prior to Visit   Medication Sig Dispense Refill   • anastrozole (ARIMIDEX) 1 MG tablet Take 1 mg by mouth Daily.     • celecoxib (CeleBREX) 200 MG capsule Take 200 mg by mouth Daily. PATIENT TO CONTACT DR. EVELIN AYALA REGARDING HOLDING PRIOR TO SURGERY     • cetirizine (ZyrTEC) 10 MG tablet Take 10 mg by mouth daily.     • diclofenac (VOLTAREN) 1 % gel gel Apply 4 g topically to the appropriate area as directed 4 (Four) Times a Day As Needed. HOLD PRIOR TO SURGERY     • DULoxetine (CYMBALTA) 60 MG capsule Take 60 mg by mouth daily. LAST DOSE 7/17/16     • levothyroxine (SYNTHROID, LEVOTHROID) 100 MCG tablet Take 100 mcg by mouth daily.     • losartan-hydrochlorothiazide (HYZAAR) 100-12.5 MG per tablet Take 1 tablet by mouth Every Morning.     • Multiple Vitamins-Minerals (ALIVE ONCE DAILY WOMENS 50+ PO) Take  by mouth.     • SAXENDA 18 MG/3ML solution pen-injector        No current facility-administered medications on file prior to visit.         ALLERGIES:  No Known Allergies     Social History     Socioeconomic History   • Marital status:      Spouse name: Mendez   • Number of children: 2   • Years of education: High school   • Highest education level: Not on file   Occupational History     Employer: RETIRED   Tobacco Use   • Smoking status: Never Smoker   • Smokeless tobacco: Never Used  "  Substance and Sexual Activity   • Alcohol use: No     Frequency: Never     Comment: maybe once a year   • Drug use: No   • Sexual activity: Yes     Partners: Male     Birth control/protection: Surgical     Comment:    Social History Narrative    2 sons        Family History   Problem Relation Age of Onset   • Lymphoma Paternal Uncle 30        non hodgkins   • Lymphoma Nephew 16        non hodgkins   • Stroke Mother    • COPD Father    • Breast cancer Neg Hx    • Malig Hyperthermia Neg Hx         Review of Systems   Constitutional: Negative for appetite change, chills, diaphoresis, fatigue, fever and unexpected weight change.   HENT: Negative for hearing loss, sore throat and trouble swallowing.    Respiratory: Negative for cough, chest tightness, shortness of breath and wheezing.    Cardiovascular: Negative for chest pain, palpitations and leg swelling.   Gastrointestinal: Negative for abdominal distention, abdominal pain, constipation, diarrhea, nausea and vomiting.   Genitourinary: Negative for dysuria, frequency, hematuria and urgency.   Musculoskeletal: Negative for joint swelling.        No muscle weakness.   Skin: Negative for rash and wound.   Neurological: Negative for seizures, syncope, speech difficulty, weakness, numbness and headaches.   Hematological: Negative for adenopathy. Does not bruise/bleed easily.   Psychiatric/Behavioral: Negative for behavioral problems, confusion and suicidal ideas.        Objective     Vitals:    09/30/19 1124   BP: 127/73   Pulse: 75   Resp: 18   Temp: 98.4 °F (36.9 °C)   TempSrc: Oral   SpO2: 94%   Weight: 100 kg (220 lb 12.8 oz)   Height: 158 cm (62.21\")   PainSc: 0-No pain     Current Status 9/30/2019   ECOG score 0       Physical Exam      GENERAL:  Well-developed, well-nourished in no acute distress.   SKIN:  Warm, dry without rashes, purpura or petechiae.  NECK:  Supple with good range of motion; no thyromegaly or masses, no JVD.  LYMPHATICS:  No cervical, " supraclavicular, axillary or inguinal adenopathy.  CHEST:  Lungs clear to auscultation. Good airflow.  CARDIAC:  Regular rate and rhythm without murmurs, rubs or gallops. Normal S1,S2.  ABDOMEN:  Soft, nontender with no hepatosplenomegaly or masses.  EXTREMITIES:  No clubbing, cyanosis or edema.  NEUROLOGICAL:  Cranial Nerves II-XII grossly intact.  No focal neurological deficits.  PSYCHIATRIC:  Normal affect and mood.          RECENT LABS:  Hematology WBC   Date Value Ref Range Status   09/30/2019 6.19 3.40 - 10.80 10*3/mm3 Final   05/09/2018 7.39 4.5 - 11.0 10*3/uL Final     RBC   Date Value Ref Range Status   09/30/2019 4.45 3.77 - 5.28 10*6/mm3 Final   05/09/2018 4.86 4.0 - 5.2 10*6/uL Final     Hemoglobin   Date Value Ref Range Status   09/30/2019 13.8 12.0 - 15.9 g/dL Final   05/09/2018 15.0 12.0 - 16.0 g/dL Final     Hematocrit   Date Value Ref Range Status   09/30/2019 42.6 34.0 - 46.6 % Final   05/09/2018 45.9 36.0 - 46.0 % Final     Platelets   Date Value Ref Range Status   09/30/2019 209 140 - 450 10*3/mm3 Final   05/09/2018 243 140 - 440 10*3/uL Final            Assessment/Plan     1.  T1 a N0 M0 invasive ductal carcinoma of the right breast.  Patient is status post lumpectomy with sentinel lymph node surgery on July 2019.  She is ER positive, NH positive HER-2/berenice negative.  -September 19, 2018 screening mammogram showed new irregular nodular density in the deep medial right breast which is suspicious and six-month follow-up suggested.  -November 7, 2018, patient had right diagnostic mammogram which showed small ill-defined opacity in the deep medial right breast but ultrasound did not show that and six-month follow-up suggested  -Sabra 3, 2019, small irregular opacity in the 3:00 axis in the upper medial right breast, 8 mm, suspicious  -Biopsy consistent with invasive ductal carcinoma, 2.8 mm, grade 1, Dallas score of 3, associated DCIS, micro papillary and cribriform, ER +%, NH +41-50%,  HER-2/berenice 1+ negative, Ki-67 1%  -July 2019, status post right lumpectomy with sentinel lymph nodes, 3 o'clock position, no invasive carcinoma identified on the lumpectomy, single focus of invasive carcinoma 2.8 mm seen at biopsy, DCIS present 7 mm, DCIS in 2 of the 27 blocks, no lymphovascular space invasion, margins clear except close anterior margin from the DCIS is 1.1 mm, 3 lymph nodes negative    · Had a lengthy discussion with patient that she does not require any chemotherapy given the small size, low-grade nature of the tumor with it being ER VT positive HER-2/berenice negative.  · She will benefit from endocrine therapy and being postmenopausal likely the best option is Arimidex.  I have discussed in detail the side effects of tamoxifen/Arimidex/Evista.  She understands the side effects and length..  · . Will plan to start Arimidex 1 mg daily.  We discussed about decreasing bone density, joint pains, cataracts, hot flashes, and a chance for hair thinning, rare chance for diarrhea, weight gain, lower extremity edema.  We will start the last 2 weeks of radiation treatment.  · Patient has seen radiation oncology already with plans to start next week.  · 08/17/19 - started Arimidex  · 09/30/19 - Patient reports having gained about 20 pounds since the diagnosis of her cancer.  As a result, she also has arthralgia.      2.  Osteopenia, reviewed bone density from December 2017 and she does have osteopenia.  We encouraged her to take calcium and vitamin D supplements.    3.  Lifestyle modification: Discussed in length with patient to exercise 40 minutes/day 5 days a week with 20 minutes of aerobics.  Also encouraged healthy diet.    4.  Referral to survivorship clinic    Plan   1. Continue Arimidex 1 mg daily  2. Take calcium 600 mg p.o. twice daily and vitamin D 1000 international units daily  3. Follow up in 4 months with Carmen with labs  4. Patient advised to start exercising     I, Katrina Go, acted as scribe  for Reshma Morales MD  in documenting the service or procedure. To the best of my knowledge, I recorded what was dictated by MD Reshma Segundo MD      CC:  MD Evelni Cortez MD Crystal McMahan, MD Robert Schweitzer, MD Janet Leon, MD

## 2019-10-03 ENCOUNTER — OFFICE VISIT (OUTPATIENT)
Dept: RADIATION ONCOLOGY | Facility: CLINIC | Age: 59
End: 2019-10-03

## 2019-10-03 VITALS
OXYGEN SATURATION: 96 % | DIASTOLIC BLOOD PRESSURE: 77 MMHG | SYSTOLIC BLOOD PRESSURE: 140 MMHG | HEIGHT: 62 IN | WEIGHT: 221 LBS | HEART RATE: 80 BPM | BODY MASS INDEX: 40.67 KG/M2

## 2019-10-03 DIAGNOSIS — C50.311 MALIGNANT NEOPLASM OF LOWER-INNER QUADRANT OF RIGHT BREAST OF FEMALE, ESTROGEN RECEPTOR POSITIVE (HCC): Primary | ICD-10-CM

## 2019-10-03 DIAGNOSIS — Z17.0 MALIGNANT NEOPLASM OF LOWER-INNER QUADRANT OF RIGHT BREAST OF FEMALE, ESTROGEN RECEPTOR POSITIVE (HCC): Primary | ICD-10-CM

## 2019-10-03 PROCEDURE — 99024 POSTOP FOLLOW-UP VISIT: CPT | Performed by: RADIOLOGY

## 2019-10-03 NOTE — PROGRESS NOTES
Subjective     No ref. provider found     Diagnosis Plan   1. Malignant neoplasm of lower-inner quadrant of right breast of female, estrogen receptor positive (CMS/HCC)       CC: 4 week follow up stage I right breast cancer                              S:  I had the pleasure of seeing Emily [facility]evedo  [facility].  She returns today for follow up now 4 weeks out from completion of her radiation therapy.  She has been doing well since I last saw her.  She completed radiation to the right breast on 9/3/19 to a dose of 4256cGy followed by a tumor bed boost. She is on arimidex and experiencing some weight gain and arthralgias.      Review of Systems   Constitutional: Positive for unexpected weight change.   Respiratory: Negative.    Musculoskeletal: Positive for arthralgias.         Past Medical History:   Diagnosis Date   • Arthritis    • Carpal tunnel syndrome    • Disease of thyroid gland     Hypothyroidism   • Graves disease    • Hearing loss     earing aids    • History of urinary tract infection    • Hypertension    • Malignant neoplasm of lower-inner quadrant of right breast of female, estrogen receptor positive (CMS/HCC) 6/24/2019   • Palpitations     Dr. Cheng PMD Benign    • Seasonal allergies    • Vertigo          Past Surgical History:   Procedure Laterality Date   • BREAST BIOPSY Right     MALIGNANT   • BREAST BIOPSY Right 2013    BENIGN   • BREAST LUMPECTOMY Right 7/8/2019    Procedure: BREAST LUMPECTOMY WITH SENTINEL NODE BIOPSY AND NEEDLE LOCALIZATION;  Surgeon: Evelin Ayala MD;  Location: [facility] [place];  Service: General   • CARPAL TUNNEL RELEASE Right    • CERVICAL CONE BIOPSY     • CHOLECYSTECTOMY  1983   • COLONOSCOPY     • GALLBLADDER SURGERY     • HYSTERECTOMY  2016   • LAPAROSCOPIC TUBAL LIGATION     • OOPHORECTOMY     • [place] TOTAL ABDOM HYSTERECTOMY Bilateral 7/28/2016    Procedure: TOTAL ABDOMINAL HYSTERECTOMY W/ BSO ;  Surgeon: George Strange MD;  Location:  [facility] MAIN OR;  Service: Obstetrics/Gynecology   • ROTATOR CUFF REPAIR Right    • TONSILLECTOMY  1963         Social History     Socioeconomic History   • Marital status:      Spouse name: Mendez   • Number of children: 2   • Years of education: High school   • Highest education level: Not on file   Occupational History     Employer: RETIRED   Tobacco Use   • Smoking status: Never Smoker   • Smokeless tobacco: Never Used   Substance and Sexual Activity   • Alcohol use: No     Frequency: Never     Comment: maybe once a year   • Drug use: No   • Sexual activity: Yes     Partners: Male     Birth control/protection: Surgical     Comment:    Social History Narrative    2 sons         Family History   Problem Relation Age of Onset   • Lymphoma Paternal Uncle 30        non hodgkins   • Lymphoma Nephew 16        non hodgkins   • Stroke Mother    • COPD Father    • Breast cancer Neg Hx    • Malig Hyperthermia Neg Hx           Objective    Physical Exam   Constitutional: She appears well-developed and well-nourished.   Pulmonary/Chest:   Right breast slightly smaller than left no palpable masses or adenopathy, minimal residual hyperpigmentation             Current Outpatient Medications on File Prior to Visit   Medication Sig Dispense Refill   • anastrozole (ARIMIDEX) 1 MG tablet Take 1 tablet by mouth Daily. 90 tablet 2   • celecoxib (CeleBREX) 200 MG capsule Take 200 mg by mouth Daily. PATIENT TO CONTACT DR. TRA NAILS REGARDING HOLDING PRIOR TO SURGERY     • cetirizine (ZyrTEC) 10 MG tablet Take 10 mg by mouth daily.     • diclofenac (VOLTAREN) 1 % gel gel Apply 4 g topically to the appropriate area as directed 4 (Four) Times a Day As Needed. HOLD PRIOR TO SURGERY     • DULoxetine (CYMBALTA) 60 MG capsule Take 60 mg by mouth daily. LAST DOSE 7/17/16     • levothyroxine (SYNTHROID, LEVOTHROID) 100 MCG tablet Take 100 mcg by mouth daily.     • losartan-hydrochlorothiazide (HYZAAR) 100-12.5 MG per tablet  Take 1 tablet by mouth Every Morning.     • Multiple Vitamins-Minerals (ALIVE ONCE DAILY WOMENS 50+ PO) Take  by mouth.     • SAXENDA 18 MG/3ML solution pen-injector        No current facility-administered medications on file prior to visit.        ALLERGIES:  No Known Allergies    LMP  (LMP Unknown)      Current Status 9/30/2019   ECOG score 0         Assessment/Plan   58 yo female with hx of stage I right breast cancer now 4 weeks out from radiation with no evidence of disease.  She is due for her yearly mammogram for the left breast next month.  She will continue arimidex and have regular follow up with Dr. Morales.  I will see her back in one year or sooner if necessary.  She knows to call for this appointment.                Thank you very much for allowing me to participate in the care of this very pleasant patient.    Sincerely,      Maddie Blake MD

## 2019-10-11 ENCOUNTER — AMBULATORY SURGICAL CENTER (OUTPATIENT)
Dept: URBAN - METROPOLITAN AREA SURGERY 20 | Facility: SURGERY | Age: 59
End: 2019-10-11
Payer: COMMERCIAL

## 2019-10-11 DIAGNOSIS — K57.30 DIVERTICULOSIS OF LARGE INTESTINE WITHOUT PERFORATION OR ABS: ICD-10-CM

## 2019-10-11 DIAGNOSIS — K64.8 OTHER HEMORRHOIDS: ICD-10-CM

## 2019-10-11 DIAGNOSIS — Z12.11 ENCOUNTER FOR SCREENING FOR MALIGNANT NEOPLASM OF COLON: ICD-10-CM

## 2019-10-11 DIAGNOSIS — Z85.3 PERSONAL HISTORY OF MALIGNANT NEOPLASM OF BREAST: ICD-10-CM

## 2019-10-11 PROCEDURE — 45378 DIAGNOSTIC COLONOSCOPY: CPT | Mod: 33

## 2019-10-11 NOTE — SERVICEHPINOTES
FIDENCIO CLOUD  is a  59  female   who presents today for a  Colonoscopy   for   the indications listed below. The updated Patient Profile was reviewed prior to the procedure, in conjunction with the Physical Exam, including medical conditions, surgical procedures, medications, allergies, family history and social history. See Physical Exam time stamp below for date and time of HPI completion.Pre-operatively, I reviewed the indication(s) for the procedure, the risks of the procedure [including but not limited to: unexpected bleeding possibly requiring hospitalization and/or unplanned repeat procedures, perforation possibly requiring surgical treatment, missed lesions and complications of sedation/MAC (also explained by anesthesia staff)]. I have evaluated the patient for risks associated with the planned anesthesia and the procedure to be performed and find the patient an acceptable candidate for IV sedation.Multiple opportunities were provided for any questions or concerns, and all questions were answered satisfactorily before any anesthesia was administered. We will proceed with the planned procedure.BR

## 2019-10-18 ENCOUNTER — HOSPITAL ENCOUNTER (OUTPATIENT)
Dept: MAMMOGRAPHY | Facility: HOSPITAL | Age: 59
Discharge: HOME | End: 2019-10-18
Admitting: SURGERY

## 2019-10-18 DIAGNOSIS — Z12.39 BREAST SCREENING: ICD-10-CM

## 2019-10-18 PROCEDURE — 77063 BREAST TOMOSYNTHESIS BI: CPT

## 2019-10-18 PROCEDURE — 77067 SCR MAMMO BI INCL CAD: CPT

## 2019-10-25 ENCOUNTER — HOSPITAL ENCOUNTER (OUTPATIENT)
Dept: PHYSICAL THERAPY | Facility: HOSPITAL | Age: 59
Setting detail: THERAPIES SERIES
Discharge: HOME | End: 2019-10-25

## 2019-10-25 ENCOUNTER — OFFICE VISIT (OUTPATIENT)
Dept: SURGERY | Facility: CLINIC | Age: 59
End: 2019-10-25

## 2019-10-25 VITALS
WEIGHT: 222 LBS | BODY MASS INDEX: 40.85 KG/M2 | SYSTOLIC BLOOD PRESSURE: 126 MMHG | DIASTOLIC BLOOD PRESSURE: 86 MMHG | HEART RATE: 76 BPM | OXYGEN SATURATION: 97 % | HEIGHT: 62 IN

## 2019-10-25 DIAGNOSIS — Z90.11 S/P PARTIAL MASTECTOMY, RIGHT: ICD-10-CM

## 2019-10-25 DIAGNOSIS — E66.01 OBESITY, MORBID, BMI 40.0-49.9 (HCC): ICD-10-CM

## 2019-10-25 DIAGNOSIS — Z12.31 ENCOUNTER FOR SCREENING MAMMOGRAM FOR MALIGNANT NEOPLASM OF BREAST: ICD-10-CM

## 2019-10-25 DIAGNOSIS — R53.1 GENERALIZED WEAKNESS: ICD-10-CM

## 2019-10-25 DIAGNOSIS — Z17.0 MALIGNANT NEOPLASM OF LOWER-INNER QUADRANT OF RIGHT BREAST OF FEMALE, ESTROGEN RECEPTOR POSITIVE (HCC): Primary | ICD-10-CM

## 2019-10-25 DIAGNOSIS — M25.50 ARTHRALGIA, UNSPECIFIED JOINT: ICD-10-CM

## 2019-10-25 DIAGNOSIS — C50.311 MALIGNANT NEOPLASM OF LOWER-INNER QUADRANT OF RIGHT BREAST OF FEMALE, ESTROGEN RECEPTOR POSITIVE (HCC): Primary | ICD-10-CM

## 2019-10-25 DIAGNOSIS — Z91.89 AT RISK FOR LYMPHEDEMA: ICD-10-CM

## 2019-10-25 PROCEDURE — 99213 OFFICE O/P EST LOW 20 MIN: CPT | Performed by: SURGERY

## 2019-10-25 PROCEDURE — 97110 THERAPEUTIC EXERCISES: CPT | Performed by: PHYSICAL THERAPIST

## 2019-10-25 PROCEDURE — 93702 BIS XTRACELL FLUID ANALYSIS: CPT | Performed by: PHYSICAL THERAPIST

## 2019-10-25 RX ORDER — INFLUENZA A VIRUS A/MICHIGAN/45/2015 X-275 (H1N1) ANTIGEN (FORMALDEHYDE INACTIVATED), INFLUENZA A VIRUS A/SINGAPORE/INFIMH-16-0019/2016 IVR-186 (H3N2) ANTIGEN (FORMALDEHYDE INACTIVATED), INFLUENZA B VIRUS B/PHUKET/3073/2013 ANTIGEN (FORMALDEHYDE INACTIVATED), AND INFLUENZA B VIRUS B/MARYLAND/15/2016 BX-69A ANTIGEN (FORMALDEHYDE INACTIVATED) 15; 15; 15; 15 UG/.5ML; UG/.5ML; UG/.5ML; UG/.5ML
INJECTION, SUSPENSION INTRAMUSCULAR
Refills: 0 | COMMUNITY
Start: 2019-10-04 | End: 2019-11-10

## 2019-10-25 RX ORDER — ZOSTER VACCINE RECOMBINANT, ADJUVANTED 50 MCG/0.5
KIT INTRAMUSCULAR
Refills: 0 | COMMUNITY
Start: 2019-10-04 | End: 2019-11-10

## 2019-10-25 NOTE — THERAPY TREATMENT NOTE
Outpatient Physical Therapy Lymphedema Treatment Note  [facility]     Patient Name: Emily Azevedo  : 1960  MRN: [id number]  Today's Date: 10/25/2019        Visit Date: 10/25/2019    Visit Dx:    ICD-10-CM ICD-9-CM   1. Malignant neoplasm of lower-inner quadrant of right breast of female, estrogen receptor positive (CMS/HCC) C50.311 174.3    Z17.0 V86.0   2. At risk for lymphedema Z91.89 V49.89   3. S/P partial mastectomy, right Z90.11 V45.71   4. Arthralgia, unspecified joint M25.50 719.40   5. Generalized weakness R53.1 780.79   6. Obesity, morbid, BMI 40.0-49.9 (CMS/HCC) E66.01 278.01       Patient Active Problem List   Diagnosis   • Postmenopausal bleeding   • Malignant neoplasm of lower-inner quadrant of right breast of female, estrogen receptor positive (CMS/HCC)   • Weight gain   • Arthralgia        Lymphedema     Row Name 10/25/19 1015             Subjective Pain    Able to rate subjective pain?  yes  -SC      Pre-Treatment Pain Level  0  -SC      Subjective Pain Comment  does report joint pain, hips knees L > R however reports no issues breast  -SC         Subjective Comments    Subjective Comments  I am doing ok. Just saw Dr. Ayala today. My left mammogram was good. I go back to her in  after my mammogram for my right. I did see Dr. Morales. She thinks the joint pain is from the AI as well, but really wants me to try to stay on it until I return to her in 2020. I just don't know. My pain in my hips and knees are so bad. She recommended exercise too but I haven't found something I can do that helps. I can't walk because of the foot spur. I don't have equipment at home. I don't feel safe in a gym. I cannot swim so I don't feel safe in a pool. I just need some guidance. I did see radiation oncology, go back in one year. I need to call [facility] for the bra fitting. I would be interested in PT to help me with exercises.   -SC         Lymphedema Assessment    Lymphedema Assessment  Comments  low right UE, moderate risk right breast  -SC         General ROM    GENERAL ROM COMMENTS  B UEs WNLs  -SC         Lymphedema Edema Assessment    Edema Assessment Comment  negative  -SC         Skin Changes/Observations    Skin Observations Comment  very minimal scar tissue, reviewed bra fitting and breast massage  -SC         L-Dex Bioimpedence Screening    L-Dex Measurement Extremity  RUE  -SC      L-Dex Patient Position  Standing  -SC      L-Dex UE Dominate Side  Right  -SC      L-Dex UE At Risk Side  Right  -SC      L-Dex UE Pre Surgical Value  No  -SC      L-Dex UE Score  -1.5  -SC      L-Dex UE Baseline Score  -0.9  -SC      L-Dex UE Value Change  -0.6  -SC      L-Dex UE Comment  WNL, stable  -SC      $ L-Dex Charge  yes  -SC        User Key  (r) = Recorded By, (t) = Taken By, (c) = Cosigned By    Initials Name Provider Type    SC Karli Peter, PT Physical Therapist                        PT Assessment/Plan     Row Name 10/25/19 1015          PT Assessment    Assessment Comments  L-Dex stable -1.5. BMI 40.5 kg/m2. skeletal muscle mass 18.1%, fat mass 48.7%. Patient's chief compliant joint pain/arthralgias with AI. Would greatly benefit from progression of generalized exercise program. Lives in [place]. Would prefer to go to PT in [place] for compliance. Education of [facility] with cancer program. Will contact facility to see if patient can attend. Patient to return in Feb 2020 to reassess for lymphedema risk. Instructed to contact sooner if joint pain persisting or for bra check if indicated.   -SC        PT Plan    PT Frequency  Other (comment)  -SC     Predicted Duration of Therapy Intervention (Therapy Eval)  Feb 2020 for reassessment, transfer of care to local PT for exercise program  -SC     PT Plan Comments  reassessment, quick dash, exercise program, bra fitting, joint pain, bioimpedance, skin care post radiation  -SC       User Key  (r) = Recorded By, (t) = Taken By, (c) = Cosigned By     Initials Name Provider Type    Karli Guerin, PT Physical Therapist             OP Exercises     Row Name 10/25/19 1015             Subjective Comments    Subjective Comments  I am doing ok. Just saw Dr. Ayala today. My left mammogram was good. I go back to her in June after my mammogram for my right. I did see Dr. Morales. She thinks the joint pain is from the AI as well, but really wants me to try to stay on it until I return to her in Feb 2020. I just don't know. My pain in my hips and knees are so bad. She recommended exercise too but I haven't found something I can do that helps. I can't walk because of the foot spur. I don't have equipment at home. I don't feel safe in a gym. I cannot swim so I don't feel safe in a pool. I just need some guidance. I did see radiation oncology, go back in one year. I need to call [facility] for the bra fitting. I would be interested in PT to help me with exercises.   -SC         Subjective Pain    Able to rate subjective pain?  yes  -SC      Pre-Treatment Pain Level  0  -SC      Subjective Pain Comment  does report joint pain, hips knees L > R however reports no issues breast  -SC        User Key  (r) = Recorded By, (t) = Taken By, (c) = Cosigned By    Initials Name Provider Type    Karli Guerin, PT Physical Therapist                      PT OP Goals     Row Name 10/25/19 1015          Long Term Goals    LTG Date to Achieve  11/11/19  -SC     LTG 1  Patient's bioimpedance score to remain between -10 and 6.5 for decreased risk of developing stage II post lumpectomy lymphedema syndrome right UE and to establish treatment for early intervention of condition if/when indicated.  -SC     LTG 1 Progress  Progressing  -SC     LTG 1 Progress Comments  currently -1.5, WNL and stable  -SC     LTG 2  Patient demonstrate independence and compliance with proper skin care during/post radiation for improved mobility, decreased scar tissue, axillary cording and edema.  -SC      LTG 2 Progress  Progressing  -SC     LTG 2 Progress Comments  reviewed today  -SC     LTG 3  Patient independent and compliant with breast mobilization techniques for improved mobility, skin care and lymphatic flow.  -SC     LTG 3 Progress  Progressing  -SC     LTG 3 Progress Comments  [facility]  Patient able to wear fitted post lumpectomy/radiation bra with padding left (if indicated)  >/=6-8 hours for work schedule for improved compression to lateral flank/right breast.  -SC     LTG 4 Progress  Ongoing  -SC     LTG 4 Progress Comments  instructed to call [facility] to set up appointment  -SC     LTG 5  Initiate introductory level training at community exercise program such as [employer] or [employer] program to allow for transition to gym exercise program and self-care of condition.  -SC     LTG 5 Progress  Ongoing  -SC     LTG 5 Progress Comments  education of transfer of care to PT clinic in [place] to educate exercise program safely  -SC        Time Calculation    PT Goal Re-Cert Due Date  11/11/19  -SC       User Key  (r) = Recorded By, (t) = Taken By, (c) = Cosigned By    Initials Name Provider Type    Karli Guerin, PT Physical Therapist          Therapy Education  Education Details: bioimpedance test and results, in depth education importance of exercise program for cardiovascular health, joint pain, weight loss, BMI, education of [employer], aquatics, and then physical therapy for generalized strengthening program, reviewed bra fitting, scar massage, breast massage and skin care.  Given: HEP, Symptoms/condition management, Pain management, Posture/body mechanics, Mobility training, Edema management, Other (comment)  Program: Progressed, Modified  How Provided: Verbal, Demonstration, Written  Provided to: Patient  Level of Understanding: Teach back education performed, Verbalized, Demonstrated              Time Calculation:   Start Time: 1015  Stop Time: 1039  Time  Calculation (min): 24 min   Therapy Charges for Today     Code Description Service Date Service Provider Modifiers Qty    78948058327 HC PT BIS XTRACELL FLUID ANALYSIS 10/25/2019 Karli Peter, PT  1    04869745539 HC PT THER PROC EA 15 MIN 10/25/2019 Karli Peter, PT GP 1                    Karli Estrada, PT  10/25/2019

## 2019-10-25 NOTE — PROGRESS NOTES
[facility]     Referring Provider: George Strange MD     Chief complaint: Routine followup breast cancer     HPI:   6/24/19:  Ms. Emily Azevedo is a 60 yo woman, seen at the request of Dr. George Strange, for a new diagnosis of right breast cancer. This was initially detected as an imaging abnormality. Her work-up is detailed in the oncologic history below. She denies any  breast lumps, pain, skin changes, or nipple discharge. She has a past history of a benign right breast biopsy in 2006. She denies any family history of breast or ovarian cancer.      7/23/19:  She underwent right partial mastectomy & SLNB on 7/8/19. See surgery & pathology details below in oncologic history. She has been recovering well and has no complaints.     10/25/19, Interval History:  She returns today for scheduled follow-up. I have reviewed the interval records since her last visit. She completed radiation on 9/3/19 and tolerated this well. She started Arimidex on 8/17/19 and has been having problems with joint pains and weight gain. She last saw PT on 9/30/19 and her L-Dex bioimpedance score was WNL. She underwent left screening MMG prior to her appointment which was benign (see imaging report details below). She still has occasional right breast pain, but otherwise denies any new breast-related complaints.       Oncology/Hematology History    9/19/17, Screening MMG with [facility] ( [facility]):  BI-RADS 2: Benign.     10/17/18, Screening MMG with [facility] ( [facility]):  There are scattered areas of fibroglandular density. Previous percutaneous biopsy clip marker in the upper central right breast. There is a new, irregular nodular opacity in the deep medial right breast along the 3:00 axis near the chest wall measuring just over 1 cm in size, newly visible since prior studies. There is a new circumscribed nodule in the medial right breast about 4 cm from the nipple that is enlarged since the prior exam. This may represent a cyst.                                  BI-RADS 0: Incomplete.    11/07/18, Right Diagnostic MMG & Right Breast US ( [facility]):  MMG:  Small focal ill-defined opacity is again noted within the deep medial right breast along the 3:00 axis. This is visible in retrospect on each of the prior studies, and may therefore potentially represent focally prominent normal fibroglandular tissue.   US:  Despite prolonged imaging, no focal lesion could be identified within this portion of the medial right breast. Two benign cysts in the medial right breast near the nipple measuring 0.9 cm and 0.6 cm, corresponding to benign-appearing nodular opacities visible on mammogram today.        BI-RADS 3: Probably benign. Follow-up MMG in six months.        Malignant neoplasm of lower-inner quadrant of right breast of female, estrogen receptor positive (CMS/HCC)    6/2/2019 Initial Diagnosis     Malignant neoplasm of lower-inner quadrant of right breast of female, estrogen receptor positive (CMS/HCC)         6/3/2019 Imaging     Right Diagnostic MMG & Right Breast US ( [facility]):    MMG:  Small focal irregular opacity is again noted along the 3:00 axis in the deep portion of the right breast about 9 cm from the nipple measuring about 8 mm. Linear opacities surround the lesion in a radial pattern. The morphology is concerning, particularly on tomogram images today.   US:  High-resolution grayscale ultrasound imaging directed to this portion of the breast today does show a subtle focus of acoustic shadowing, although mass is not clearly delineated.   BI-RADS 4: Suspicious.         6/13/2019 Biopsy     Right breast, Stereotactic biopsy ( [facility]):    1. Breast, Right Deep Medial 3 O'clock Position, Core Biopsy: Breast parenchyma with               A.  Minimally represented invasive carcinoma of no special type (ductal) measuring 2.8 mm maximally                     on the slide, Meghan histologic grade I (tubules = 1, nuclei = 1, mitoses = 1), with  associated ductal                     carcinoma in situ (DCIS) with cribriform and micropapillary architecture and low nuclear grade.    ER+ (%, strong)  DE+ (41-50%, moderate)  HER2 negative (IHC 1+)   Ki-67 1%         6/20/2019 Imaging     Bilateral Breast MRI ([facility]):  The mammogram showed a very tiny cluster of microcalcifications in the lower inner quadrant of the right breast that were biopsied under stereotactic guidance. The biopsy marking clip is identified in the lower inner quadrant at approximately the 4:00 location. It is contained within the inferior aspect of a tubular shaped biopsy tract containing a small amount of subacute hemorrhage. This measures 9 mm in width and 4.7 cm in length. There is only a thin rind of low-level enhancement surrounding the biopsy clip and the tract that is consistent with enhancement due to postoperative change. No suspicious above threshold enhancement is identified. There is no MRI evidence of significant residual neoplasm. The patient certainly appears to be a candidate for breast conservation. No discrete mass is identified. Incidental note is made of an 8 mm diameter cyst in the anterior aspect of the right breast at the 3:00 location. There are a few scattered benign-appearing stippled areas of enhancement within the left breast. A small briskly enhancing lymph node is also noted at the 3:00 location within the middle third of the left breast. There is no axillary or internal mammary lymphadenopathy. The chest wall is unremarkable.   BI-RADS 6: Known malignancy.         7/8/2019 Surgery     Right needle-localized partial mastectomy and sentinel lymph node biopsy        1.  Right Breast Needle Localization (44 grams):                A.  RESIDUAL FOCUS OF DUCTAL CARCINOMA IN SITU, 7 MM, CRIBRIFORM TYPE (LOW NUCLEAR                    GRADE) ADJACENT TO THE PREVIOUS BIOPSY SITE.                B.  Negative for residual invasive neoplasm.                C.  All  surgical margins of excision are negative for DCIS (closest margin anterior, 1.1 mm).                D.  For receptor studies, see previous pathology report (ER 95%, SC 45%, HER2-Gloria negative, Ki-67 1%).      2.  Right [place] Lymph Node #1:                A.  Single benign lymph node.               B.  Multiple stepped sections are negative for tumor.                 3.  Right [place] Lymph Node #2:                A.  Single benign lymph node.               B.  Multiple stepped sections are negative for tumor.     4.  Right [place] Lymph Node #3:                A.  Single benign lymph node.               B.  Multiple stepped sections are negative for tumor.     5.  Right Breast, Additional Lateral Margin:                A.  Benign breast parenchyma.                B.  Negative for neoplasm.           8/5/2019 - 9/3/2019 Radiation     Radiation OncologyTreatment Course:  Emily Azevedo received 5256 cGy in 21 fractions to right breast.         8/17/2019 -  Hormonal Therapy     Anastrozole (Arimidex) 1 mg by mouth daily         10/18/2019 Imaging     Left Screening MMG with [facility] ([facility]):  Scattered areas of fibroglandular density. Focal density in the left upper-outer quadrant is stable from 09/19/2017. There are no masses or suspicious calcifications.  BI-RADS 2: Benign.            Review of Systems:  See interval history.       Medications:    Current Outpatient Medications:   •  anastrozole (ARIMIDEX) 1 MG tablet, Take 1 tablet by mouth Daily., Disp: 90 tablet, Rfl: 2  •  celecoxib (CeleBREX) 200 MG capsule, Take 200 mg by mouth Daily. PATIENT TO CONTACT DR. TRA NAILS REGARDING HOLDING PRIOR TO SURGERY, Disp: , Rfl:   •  cetirizine (ZyrTEC) 10 MG tablet, Take 10 mg by mouth daily., Disp: , Rfl:   •  diclofenac (VOLTAREN) 1 % gel gel, Apply 4 g topically to the appropriate area as directed 4 (Four) Times a Day As Needed. HOLD PRIOR TO SURGERY, Disp: , Rfl:   •  DULoxetine (CYMBALTA) 60 MG capsule, Take  60 mg by mouth daily. LAST DOSE 7/17/16, Disp: , Rfl:   •  FLUZONE QUADRIVALENT 0.5 ML suspension prefilled syringe injection, TO BE ADMINISTERED BY PHARMACIST FOR IMMUNIZATION, Disp: , Rfl: 0  •  levothyroxine (SYNTHROID, LEVOTHROID) 100 MCG tablet, Take 100 mcg by mouth daily., Disp: , Rfl:   •  losartan-hydrochlorothiazide (HYZAAR) 100-12.5 MG per tablet, Take 1 tablet by mouth Every Morning., Disp: , Rfl:   •  Multiple Vitamins-Minerals (ALIVE ONCE DAILY WOMENS 50+ PO), Take  by mouth., Disp: , Rfl:   •  SAXENDA 18 MG/3ML solution pen-injector, , Disp: , Rfl:   •  SHINGRIX 50 MCG/0.5ML reconstituted suspension, TO BE ADMINISTERED BY PHARMACIST FOR IMMUNIZATION, Disp: , Rfl: 0      Allergies:  No Known Allergies      Family History   Problem Relation Age of Onset   • Lymphoma Paternal Uncle 30        non hodgkins   • Lymphoma Nephew 16        non hodgkins   • Stroke Mother    • COPD Father    • Breast cancer Neg Hx    • Malig Hyperthermia Neg Hx        PHYSICAL EXAMINATION:   Vitals:    10/25/19 0932   BP: 126/86   Pulse: 76   SpO2: 97%     ECOG 0 - Asymptomatic  General: NAD, well appearing  Psych: a&o x 3, normal mood and affect  Eyes: EOMI, no scleral icterus  ENMT: neck supple without masses or thyromegaly, mucus membranes moist  MSK: normal gait, normal ROM in bilateral shoulders  Lymph nodes: Well-healed right axillary incision; no cervical, supraclavicular or axillary lymphadenopathy  Breast: large size, grade 3 ptosis  Right: On inspection, the breast is slightly smaller and uplifted vs the left and there is mild hyperpigmentation. Well-healed LIQ radial incision. Scar is soft. No concavity or fullness at the surgical site. No masses or nipple abnormalities.  Left: No visible abnormalities on inspection while seated, with arms raised or hands on hips. No masses, skin changes, or nipple abnormalities.    I have independently reviewed her imaging and here are my findings:   No suspicious findings in the  left breast.       Assessment:  59 y.o. F with a diagnosis of right breast cancer: low grade, invasive ductal carcinoma, ER/SD+, Her2 negative, with associated DCIS. She is sp right partial mastectomy & SLNB on 7/8/19, pT1aN0, anatomic stage IA, prognostic stage IA. She completed radiation on 9/3/19. She is currently on Arimidex.     Plan:  -Continue f/u with Dr. Morales.   -Continue f/u with Dr. Blake.  -Continue f/u with PT/LE clinic.   -F/u in 6/2020 with bilateral screening MMG.  -She was instructed to call sooner with any questions, concerns or changes on BSE.    Evelin Ayala MD      CC:  MD Gopal Prince MD

## 2019-10-29 ENCOUNTER — TELEPHONE (OUTPATIENT)
Dept: SURGERY | Facility: CLINIC | Age: 59
End: 2019-10-29

## 2019-10-29 NOTE — TELEPHONE ENCOUNTER
MMG is scheduled on 6/3/2020 @ 10:00am.    F/u appointment with Dr. Ayala is scheduled on 6/10/2020 @ 10:00am.    Called and spoke to patient, patient expressed verbal understanding of appointment times.    Sent patient a reminder letter in the mail.

## 2019-10-31 DIAGNOSIS — Z79.811 USE OF ANASTROZOLE (ARIMIDEX): Primary | ICD-10-CM

## 2019-10-31 DIAGNOSIS — M85.851 OSTEOPENIA OF NECK OF RIGHT FEMUR: ICD-10-CM

## 2019-10-31 DIAGNOSIS — Z79.899 ENCOUNTER FOR LONG-TERM (CURRENT) USE OF HIGH-RISK MEDICATION: ICD-10-CM

## 2019-12-10 ENCOUNTER — APPOINTMENT (OUTPATIENT)
Dept: BONE DENSITY | Facility: HOSPITAL | Age: 59
End: 2019-12-10

## 2019-12-10 DIAGNOSIS — M85.851 OSTEOPENIA OF NECK OF RIGHT FEMUR: ICD-10-CM

## 2019-12-10 DIAGNOSIS — Z79.899 ENCOUNTER FOR LONG-TERM (CURRENT) USE OF HIGH-RISK MEDICATION: ICD-10-CM

## 2019-12-10 DIAGNOSIS — Z79.811 USE OF ANASTROZOLE (ARIMIDEX): ICD-10-CM

## 2019-12-10 PROCEDURE — 77080 DXA BONE DENSITY AXIAL: CPT

## 2019-12-27 ENCOUNTER — TELEPHONE (OUTPATIENT)
Dept: ONCOLOGY | Facility: CLINIC | Age: 59
End: 2019-12-27

## 2019-12-27 NOTE — TELEPHONE ENCOUNTER
Pt calling for bone density results. Informed her results were normal and showed no osteopenia or osteoporosis. Pt v/u.

## 2020-02-03 ENCOUNTER — HOSPITAL ENCOUNTER (OUTPATIENT)
Dept: PHYSICAL THERAPY | Facility: HOSPITAL | Age: 60
Setting detail: THERAPIES SERIES
Discharge: HOME | End: 2020-02-03

## 2020-02-03 ENCOUNTER — OFFICE VISIT (OUTPATIENT)
Dept: ONCOLOGY | Facility: CLINIC | Age: 60
End: 2020-02-03

## 2020-02-03 ENCOUNTER — LAB (OUTPATIENT)
Dept: LAB | Facility: HOSPITAL | Age: 60
End: 2020-02-03

## 2020-02-03 VITALS
BODY MASS INDEX: 38.77 KG/M2 | RESPIRATION RATE: 16 BRPM | DIASTOLIC BLOOD PRESSURE: 78 MMHG | HEART RATE: 84 BPM | TEMPERATURE: 98.3 F | OXYGEN SATURATION: 94 % | HEIGHT: 63 IN | WEIGHT: 218.8 LBS | SYSTOLIC BLOOD PRESSURE: 126 MMHG

## 2020-02-03 DIAGNOSIS — C50.311 MALIGNANT NEOPLASM OF LOWER-INNER QUADRANT OF RIGHT BREAST OF FEMALE, ESTROGEN RECEPTOR POSITIVE (HCC): Primary | ICD-10-CM

## 2020-02-03 DIAGNOSIS — Z91.89 AT RISK FOR LYMPHEDEMA: ICD-10-CM

## 2020-02-03 DIAGNOSIS — E66.01 OBESITY, MORBID, BMI 40.0-49.9 (HCC): ICD-10-CM

## 2020-02-03 DIAGNOSIS — R23.2 HOT FLASHES RELATED TO AROMATASE INHIBITOR THERAPY: ICD-10-CM

## 2020-02-03 DIAGNOSIS — Z17.0 MALIGNANT NEOPLASM OF LOWER-INNER QUADRANT OF RIGHT BREAST OF FEMALE, ESTROGEN RECEPTOR POSITIVE (HCC): Primary | ICD-10-CM

## 2020-02-03 DIAGNOSIS — M25.50 ARTHRALGIA, UNSPECIFIED JOINT: ICD-10-CM

## 2020-02-03 DIAGNOSIS — T45.1X5A HOT FLASHES RELATED TO AROMATASE INHIBITOR THERAPY: ICD-10-CM

## 2020-02-03 DIAGNOSIS — R53.1 GENERALIZED WEAKNESS: ICD-10-CM

## 2020-02-03 DIAGNOSIS — R63.5 WEIGHT GAIN: ICD-10-CM

## 2020-02-03 DIAGNOSIS — M19.90 OSTEOARTHRITIS, UNSPECIFIED OSTEOARTHRITIS TYPE, UNSPECIFIED SITE: ICD-10-CM

## 2020-02-03 DIAGNOSIS — N95.0 POSTMENOPAUSAL BLEEDING: ICD-10-CM

## 2020-02-03 DIAGNOSIS — Z79.811 USE OF ANASTROZOLE (ARIMIDEX): ICD-10-CM

## 2020-02-03 DIAGNOSIS — C50.311 MALIGNANT NEOPLASM OF LOWER-INNER QUADRANT OF RIGHT BREAST OF FEMALE, ESTROGEN RECEPTOR POSITIVE (HCC): ICD-10-CM

## 2020-02-03 DIAGNOSIS — Z90.11 S/P PARTIAL MASTECTOMY, RIGHT: ICD-10-CM

## 2020-02-03 DIAGNOSIS — Z17.0 MALIGNANT NEOPLASM OF LOWER-INNER QUADRANT OF RIGHT BREAST OF FEMALE, ESTROGEN RECEPTOR POSITIVE (HCC): ICD-10-CM

## 2020-02-03 LAB
ALBUMIN SERPL-MCNC: 4.1 G/DL (ref 3.5–5.2)
ALBUMIN/GLOB SERPL: 1.4 G/DL (ref 1.1–2.4)
ALP SERPL-CCNC: 88 U/L (ref 38–116)
ALT SERPL W P-5'-P-CCNC: 36 U/L (ref 0–33)
ANION GAP SERPL CALCULATED.3IONS-SCNC: 12.5 MMOL/L (ref 5–15)
AST SERPL-CCNC: 38 U/L (ref 0–32)
BASOPHILS # BLD AUTO: 0.04 10*3/MM3 (ref 0–0.2)
BASOPHILS NFR BLD AUTO: 0.6 % (ref 0–1.5)
BILIRUB SERPL-MCNC: 0.4 MG/DL (ref 0.2–1.2)
BUN BLD-MCNC: 16 MG/DL (ref 6–20)
BUN/CREAT SERPL: 21.3 (ref 7.3–30)
CALCIUM SPEC-SCNC: 9.7 MG/DL (ref 8.5–10.2)
CHLORIDE SERPL-SCNC: 101 MMOL/L (ref 98–107)
CO2 SERPL-SCNC: 28.5 MMOL/L (ref 22–29)
CREAT BLD-MCNC: 0.75 MG/DL (ref 0.6–1.1)
DEPRECATED RDW RBC AUTO: 45.8 FL (ref 37–54)
EOSINOPHIL # BLD AUTO: 0.38 10*3/MM3 (ref 0–0.4)
EOSINOPHIL NFR BLD AUTO: 5.3 % (ref 0.3–6.2)
ERYTHROCYTE [DISTWIDTH] IN BLOOD BY AUTOMATED COUNT: 13.3 % (ref 12.3–15.4)
GFR SERPL CREATININE-BSD FRML MDRD: 79 ML/MIN/1.73
GLOBULIN UR ELPH-MCNC: 3 GM/DL (ref 1.8–3.5)
GLUCOSE BLD-MCNC: 101 MG/DL (ref 74–124)
HCT VFR BLD AUTO: 42 % (ref 34–46.6)
HGB BLD-MCNC: 14 G/DL (ref 12–15.9)
IMM GRANULOCYTES # BLD AUTO: 0.02 10*3/MM3 (ref 0–0.05)
IMM GRANULOCYTES NFR BLD AUTO: 0.3 % (ref 0–0.5)
LYMPHOCYTES # BLD AUTO: 1.71 10*3/MM3 (ref 0.7–3.1)
LYMPHOCYTES NFR BLD AUTO: 24.1 % (ref 19.6–45.3)
MCH RBC QN AUTO: 31.4 PG (ref 26.6–33)
MCHC RBC AUTO-ENTMCNC: 33.3 G/DL (ref 31.5–35.7)
MCV RBC AUTO: 94.2 FL (ref 79–97)
MONOCYTES # BLD AUTO: 0.6 10*3/MM3 (ref 0.1–0.9)
MONOCYTES NFR BLD AUTO: 8.4 % (ref 5–12)
NEUTROPHILS # BLD AUTO: 4.36 10*3/MM3 (ref 1.7–7)
NEUTROPHILS NFR BLD AUTO: 61.3 % (ref 42.7–76)
NRBC BLD AUTO-RTO: 0 /100 WBC (ref 0–0.2)
PLATELET # BLD AUTO: 220 10*3/MM3 (ref 140–450)
PMV BLD AUTO: 10.2 FL (ref 6–12)
POTASSIUM BLD-SCNC: 3.9 MMOL/L (ref 3.5–4.7)
PROT SERPL-MCNC: 7.1 G/DL (ref 6.3–8)
RBC # BLD AUTO: 4.46 10*6/MM3 (ref 3.77–5.28)
SODIUM BLD-SCNC: 142 MMOL/L (ref 134–145)
WBC NRBC COR # BLD: 7.11 10*3/MM3 (ref 3.4–10.8)

## 2020-02-03 PROCEDURE — 85025 COMPLETE CBC W/AUTO DIFF WBC: CPT

## 2020-02-03 PROCEDURE — 99213 OFFICE O/P EST LOW 20 MIN: CPT | Performed by: INTERNAL MEDICINE

## 2020-02-03 PROCEDURE — 80053 COMPREHEN METABOLIC PANEL: CPT

## 2020-02-03 PROCEDURE — 36415 COLL VENOUS BLD VENIPUNCTURE: CPT

## 2020-02-03 PROCEDURE — 93702 BIS XTRACELL FLUID ANALYSIS: CPT | Performed by: PHYSICAL THERAPIST

## 2020-02-03 PROCEDURE — 97110 THERAPEUTIC EXERCISES: CPT | Performed by: PHYSICAL THERAPIST

## 2020-02-03 NOTE — THERAPY RE-EVALUATION
Physical Therapy Lymphedema Re-Evaluation  [facility]     Patient Name: Emily Azevedo  : 1960  MRN: [id number]  Today's Date: 2/3/2020      Visit Date: 2020    Visit Dx:    ICD-10-CM ICD-9-CM   1. Malignant neoplasm of lower-inner quadrant of right breast of female, estrogen receptor positive (CMS/HCC) C50.311 174.3    Z17.0 V86.0   2. At risk for lymphedema Z91.89 V49.89   3. S/P partial mastectomy, right Z90.11 V45.71   4. Arthralgia, unspecified joint M25.50 719.40   5. Generalized weakness R53.1 780.79   6. Obesity, morbid, BMI 40.0-49.9 (CMS/HCC) E66.01 278.01   7. Postmenopausal bleeding N95.0 627.1       Patient Active Problem List   Diagnosis   • Postmenopausal bleeding   • Malignant neoplasm of lower-inner quadrant of right breast of female, estrogen receptor positive (CMS/HCC)   • Weight gain   • Arthralgia        Past Medical History:   Diagnosis Date   • Arthritis    • Carpal tunnel syndrome    • Disease of thyroid gland     Hypothyroidism   • Graves disease    • Hearing loss     earing aids    • History of urinary tract infection    • Hypertension    • Malignant neoplasm of lower-inner quadrant of right breast of female, estrogen receptor positive (CMS/HCC) 2019   • Palpitations     Dr. Cheng PMD Benign    • Seasonal allergies    • Vertigo         Past Surgical History:   Procedure Laterality Date   • BREAST BIOPSY Right     MALIGNANT   • BREAST BIOPSY Right     BENIGN   • BREAST LUMPECTOMY Right 2019    Procedure: BREAST LUMPECTOMY WITH SENTINEL NODE BIOPSY AND NEEDLE LOCALIZATION;  Surgeon: Evelin Ayala MD;  [place] [facility] [place] [facility] [place]  Service: General   • CARPAL TUNNEL RELEASE Right    • CERVICAL CONE BIOPSY     • CHOLECYSTECTOMY     • COLONOSCOPY     • GALLBLADDER SURGERY     • HYSTERECTOMY     • LAPAROSCOPIC TUBAL LIGATION     • OOPHORECTOMY     • NV TOTAL ABDOM HYSTERECTOMY Bilateral 2016    Procedure: TOTAL ABDOMINAL HYSTERECTOMY W/ BSO ;   Surgeon: George Strange MD;  Location: [facility] OR;  Service: Obstetrics/Gynecology   • ROTATOR CUFF REPAIR Right    • TONSILLECTOMY  1963       Visit Dx:    ICD-10-CM ICD-9-CM   1. Malignant neoplasm of lower-inner quadrant of right breast of female, estrogen receptor positive (CMS/[facility]) C50.311 174.3    Z17.0 V86.0   2. At risk for lymphedema Z91.89 V49.89   3. S/P partial mastectomy, right Z90.11 V45.71   4. Arthralgia, unspecified joint M25.50 719.40   5. Generalized weakness R53.1 780.79   6. Obesity, morbid, BMI 40.0-49.9 (CMS/[facility]) E66.01 278.01   7. Postmenopausal bleeding N95.0 627.1           Lymphedema     Row Name 02/03/20 1220             Subjective Pain    Able to rate subjective pain?  yes  -SC      Pre-Treatment Pain Level  0  -SC         Subjective Comments    Subjective Comments  head cold about 3 weeks. I went to [facility] 2x/week for 6 weeks helping all joint pains. I did get a diet program from them and I am going to start that. I bought the book and down 10 pounds. I am motivated now.   -SC         Lymphedema Assessment    Lymphedema Classification  RUE:;Other:;secondary;at risk/stage 0 right breast, at risk  -SC      Lymphedema Cancer Related Sx  right;lumpectomy;sentinel node biopsy  -SC      Lymphedema Surgery Comments  07/08/2019  -SC      Lymph Nodes Removed #  3  -SC      Positive Lymph Nodes #  0  -SC      Stage of Cancer  Stage I  -SC      Chemo Received  no  -SC      Radiation Therapy Received  yes  -SC      Radiation Treatments #/Timeframe  completed 09/30/2019  -SC      Adverse Radiation Reactions/Complication  mild skin changes  -SC      Infections or Cellulitis?  no  -SC      Lymphedema Assessment Comments  low right UE, moderate risk right breast  -SC         Posture/Observations    Forward Head  Mild;Increased;Sitting posture  -SC      Cervical Lordosis  Normal  -SC      Thoracic Kyphosis  Mild;Increased;Sitting posture  -SC      Rounded Shoulders  Mild;Increased;Sitting  posture  -SC         General ROM    GENERAL ROM COMMENTS  B UEs WNLs  -SC         MMT (Manual Muscle Testing)    General MMT Comments  B UEs WFLs  -SC         Lymphedema Edema Assessment    Edema Assessment Comment  negative  -SC         Skin Changes/Observations    Skin Observations Comment  very minimal scar tissue  -SC         Lymphedema Sensation    Lymphedema Sensation Tests  light touch  -SC      Lymphedema Light Touch  RUE:;WNL  -SC         Lymphedema Pulses/Capillary Refill    Lymph Pulses Capillary Refill Comments  intact  -SC         Manual Lymphatic Drainage    Manual Therapy  reviewed scar massage  -SC         Compression/Skin Care    Compression/Skin Care Comments  proper fitting bra  -SC         L-Dex Bioimpedence Screening    L-Dex Measurement Extremity  RUE  -SC      L-Dex Patient Position  Standing  -SC      L-Dex UE Dominate Side  Right  -SC      L-Dex UE At Risk Side  Right  -SC      L-Dex UE Pre Surgical Value  No  -SC      L-Dex UE Score  0.8  -SC      L-Dex UE Baseline Score  -0.9  -SC      L-Dex UE Value Change  1.7  -SC      L-Dex UE Comment  WNL, stable  -SC      $ L-Dex Charge  yes  -SC        User Key  (r) = Recorded By, (t) = Taken By, (c) = Cosigned By    Initials Name Provider Type    Karli Guerin, PT Physical Therapist                          Therapy Education  Education Details: bioimpedance test and results, diet/exercise, continuation of care, lymphedema risk factors  Given: Symptoms/condition management, Other (comment)  Program: Reinforced  How Provided: Verbal  Provided to: Patient  Level of Understanding: Verbalized      OP Exercises     Row Name 02/03/20 1220             Subjective Comments    Subjective Comments  head cold about 3 weeks. [facility] pains. I did get a diet program from them and I am going to start that. I bought the book and down 10 pounds. I am motivated now.   -SC         Subjective Pain    Able to rate  subjective pain?  yes  -SC      Pre-Treatment Pain Level  0  -SC        User Key  (r) = Recorded By, (t) = Taken By, (c) = Cosigned By    Initials Name Provider Type    Karli Guerin, PT Physical Therapist                      PT OP Goals     Row Name 02/03/20 1220          Long Term Goals    LTG Date to Achieve  05/04/20  -SC     LTG 1  Patient's bioimpedance score to remain between -10 and 6.5 for decreased risk of developing stage II post lumpectomy lymphedema syndrome right UE and to establish treatment for early intervention of condition if/when indicated.  -SC     LTG 1 Progress  Progressing  -SC     LTG 1 Progress Comments  currently 0.8, WNL and stable for patient  -SC     LTG 2  Patient demonstrate independence and compliance with proper skin care during/post radiation for improved mobility, decreased scar tissue, axillary cording and edema.  -SC     LTG 2 Progress  Met  -SC     LTG 3  Patient independent and compliant with breast mobilization techniques for improved mobility, skin care and lymphatic flow.  -SC     LTG 3 Progress  Met  -SC     LTG 4  Patient able to wear fitted post lumpectomy/radiation bra with padding left (if indicated)  >/=6-8 hours for work schedule for improved compression to lateral flank/right breast.  -SC     LTG 4 Progress  Met  -SC     LTG 5  Initiate introductory level training at community exercise program such as [employer] or [employer] program to allow for transition to gym exercise program and self-care of condition.  -SC     LTG 5 Progress  Met  -SC     LTG 5 Progress Comments  with [facility] in hometown  -SC        Time Calculation    PT Goal Re-Cert Due Date  05/04/20  -SC       User Key  (r) = Recorded By, (t) = Taken By, (c) = Cosigned By    Initials Name Provider Type    Karli Guerin PT Physical Therapist          PT Assessment/Plan     Row Name 02/03/20 1220          PT Assessment    Functional Limitations  Limitation in home management;Limitations  in community activities;Performance in sport activities  -SC     Impairments  Impaired lymphatic circulation;Impaired flexibility;Integumentary integrity  -SC     Assessment Comments  L-Dex stable -1.5. BMI 40.5 kg/m2. skeletal muscle mass 18.1%, fat mass 48.7%. Patient's chief compliant joint pain/arthralgias with [facility]. Would greatly benefit from progression of generalized exercise program. Lives in [place]. Education of [facility] with cancer program. Will contact facility to see if patient can attend. Patient to return in Feb 2020 to reassess for lymphedema risk. Instructed to contact sooner if joint pain persisting or for bra check if indicated.   -SC        PT Plan    PT Frequency  Other (comment)  -SC     Predicted Duration of Therapy Intervention (Therapy Eval)  due for bioimpedance 3-6 months  -SC     PT Plan Comments  patient to call and schedule next appointment with oncology appointment  -SC       User Key  (r) = Recorded By, (t) = Taken By, (c) = Cosigned By    Initials Name Provider Type    SC Karli Peter PT Physical Therapist                       Time Calculation:   Start Time: 1220  Stop Time: 1240  Time Calculation (min): 20 min   Therapy Charges for Today     Code Description Service Date Service Provider Modifiers Qty    47378427320 HC PT BIS XTRACELL FLUID ANALYSIS 2/3/2020 Karli Peter, PT  1    98029361725 HC PT THER PROC EA 15 MIN 2/3/2020 Karli Petre PT GP 1                    Karli Estrada PT  2/3/2020

## 2020-06-03 ENCOUNTER — HOSPITAL ENCOUNTER (OUTPATIENT)
Dept: MAMMOGRAPHY | Facility: HOSPITAL | Age: 60
Discharge: HOME | End: 2020-06-03
Admitting: SURGERY

## 2020-06-03 ENCOUNTER — APPOINTMENT (OUTPATIENT)
Dept: MAMMOGRAPHY | Facility: HOSPITAL | Age: 60
End: 2020-06-03

## 2020-06-03 DIAGNOSIS — Z12.31 ENCOUNTER FOR SCREENING MAMMOGRAM FOR MALIGNANT NEOPLASM OF BREAST: ICD-10-CM

## 2020-06-03 PROCEDURE — 77063 BREAST TOMOSYNTHESIS BI: CPT

## 2020-06-03 PROCEDURE — 77067 SCR MAMMO BI INCL CAD: CPT

## 2020-06-10 ENCOUNTER — OFFICE VISIT (OUTPATIENT)
Dept: SURGERY | Facility: CLINIC | Age: 60
End: 2020-06-10

## 2020-06-10 VITALS
SYSTOLIC BLOOD PRESSURE: 114 MMHG | HEART RATE: 82 BPM | BODY MASS INDEX: 41.22 KG/M2 | WEIGHT: 224 LBS | HEIGHT: 62 IN | OXYGEN SATURATION: 96 % | DIASTOLIC BLOOD PRESSURE: 76 MMHG

## 2020-06-10 DIAGNOSIS — C50.311 MALIGNANT NEOPLASM OF LOWER-INNER QUADRANT OF RIGHT BREAST OF FEMALE, ESTROGEN RECEPTOR POSITIVE (HCC): Primary | ICD-10-CM

## 2020-06-10 DIAGNOSIS — Z17.0 MALIGNANT NEOPLASM OF LOWER-INNER QUADRANT OF RIGHT BREAST OF FEMALE, ESTROGEN RECEPTOR POSITIVE (HCC): Primary | ICD-10-CM

## 2020-06-10 PROCEDURE — 99213 OFFICE O/P EST LOW 20 MIN: CPT | Performed by: SURGERY

## 2020-06-10 NOTE — PROGRESS NOTES
[facility]     Referring Provider: George Strange MD     Chief complaint: Routine followup breast cancer     HPI:   6/24/19:  Ms. Emily Azevedo is a 58 yo woman, seen at the request of Dr. George Strange, for a new diagnosis of right breast cancer. This was initially detected as an imaging abnormality. Her work-up is detailed in the oncologic history below. She denies any  breast lumps, pain, skin changes, or nipple discharge. She has a past history of a benign right breast biopsy in 2006. She denies any family history of breast or ovarian cancer.      7/23/19:  She underwent right partial mastectomy & SLNB on 7/8/19. See surgery & pathology details below in oncologic history. She has been recovering well and has no complaints.      10/25/19:  She returns today for scheduled follow-up. I have reviewed the interval records since her last visit. She completed radiation on 9/3/19 and tolerated this well. She started Arimidex on 8/17/19 and has been having problems with joint pains and weight gain. She last saw PT on 9/30/19 and her L-Dex bioimpedance score was WNL. She underwent left screening MMG prior to her appointment which was benign (see imaging report details below). She still has occasional right breast pain, but otherwise denies any new breast-related complaints.    6/10/20, Interval History:  She returns today for scheduled follow-up. She underwent screening MMG prior to her appointment which was benign (see imaging report details below). She continues on Arimidex and is still having issues with hot flashes and difficulty losing weight. She last saw PT in 2/2020 and her bioimpedance score was within normal limits. She denies any new complaints.       Oncology/Hematology History    9/19/17, Screening MMG with [facility] ( [facility]):  BI-RADS 2: Benign.     10/17/18, Screening MMG with [facility] ( [facility]):  There are scattered areas of fibroglandular density. Previous percutaneous biopsy clip  marker in the upper central right breast. There is a new, irregular nodular opacity in the deep medial right breast along the 3:00 axis near the chest wall measuring just over 1 cm in size, newly visible since prior studies. There is a new circumscribed nodule in the medial right breast about 4 cm from the nipple that is enlarged since the prior exam. This may represent a cyst.                                 BI-RADS 0: Incomplete.    11/07/18, Right Diagnostic MMG & Right Breast US ( [facility]):  MMG:  Small focal ill-defined opacity is again noted within the deep medial right breast along the 3:00 axis. This is visible in retrospect on each of the prior studies, and may therefore potentially represent focally prominent normal fibroglandular tissue.   US:  Despite prolonged imaging, no focal lesion could be identified within this portion of the medial right breast. Two benign cysts in the medial right breast near the nipple measuring 0.9 cm and 0.6 cm, corresponding to benign-appearing nodular opacities visible on mammogram today.        BI-RADS 3: Probably benign. Follow-up MMG in six months.        Malignant neoplasm of lower-inner quadrant of right breast of female, estrogen receptor positive (CMS/HCC)    6/2/2019 Initial Diagnosis     Malignant neoplasm of lower-inner quadrant of right breast of female, estrogen receptor positive (CMS/HCC)      6/3/2019 Imaging     Right Diagnostic MMG & Right Breast US ( [facility]):    MMG:  Small focal irregular opacity is again noted along the 3:00 axis in the deep portion of the right breast about 9 cm from the nipple measuring about 8 mm. Linear opacities surround the lesion in a radial pattern. The morphology is concerning, particularly on tomogram images today.   US:  High-resolution grayscale ultrasound imaging directed to this portion of the breast today does show a subtle focus of acoustic shadowing, although mass is not clearly delineated.   BI-RADS 4: Suspicious.       6/13/2019 Biopsy     Right breast, Stereotactic biopsy ([facility]):    1. Breast, Right Deep Medial 3 O'clock Position, Core Biopsy: Breast parenchyma with               A.  Minimally represented invasive carcinoma of no special type (ductal) measuring 2.8 mm maximally                     on the slide, Meghan histologic grade I (tubules = 1, nuclei = 1, mitoses = 1), with associated ductal                     carcinoma in situ (DCIS) with cribriform and micropapillary architecture and low nuclear grade.    ER+ (%, strong)  CT+ (41-50%, moderate)  HER2 negative (IHC 1+)   Ki-67 1%      6/20/2019 Imaging     Bilateral Breast MRI ( [facility]):  The mammogram showed a very tiny cluster of microcalcifications in the lower inner quadrant of the right breast that were biopsied under stereotactic guidance. The biopsy marking clip is identified in the lower inner quadrant at approximately the 4:00 location. It is contained within the inferior aspect of a tubular shaped biopsy tract containing a small amount of subacute hemorrhage. This measures 9 mm in width and 4.7 cm in length. There is only a thin rind of low-level enhancement surrounding the biopsy clip and the tract that is consistent with enhancement due to postoperative change. No suspicious above threshold enhancement is identified. There is no MRI evidence of significant residual neoplasm. The patient certainly appears to be a candidate for breast conservation. No discrete mass is identified. Incidental note is made of an 8 mm diameter cyst in the anterior aspect of the right breast at the 3:00 location. There are a few scattered benign-appearing stippled areas of enhancement within the left breast. A small briskly enhancing lymph node is also noted at the 3:00 location within the middle third of the left breast. There is no axillary or internal mammary lymphadenopathy. The chest wall is unremarkable.   BI-RADS 6: Known malignancy.      7/8/2019 Surgery     Right  needle-localized partial mastectomy and sentinel lymph node biopsy        1.  Right Breast Needle Localization (44 grams):                A.  RESIDUAL FOCUS OF DUCTAL CARCINOMA IN SITU, 7 MM, CRIBRIFORM TYPE (LOW NUCLEAR                    GRADE) ADJACENT TO THE PREVIOUS BIOPSY SITE.                B.  Negative for residual invasive neoplasm.                C.  All surgical margins of excision are negative for DCIS (closest margin anterior, 1.1 mm).                D.  For receptor studies, see previous pathology report (ER 95%, OK 45%, HER2-Gloria negative, Ki-67 1%).      2.  Right [place] Lymph Node #1:                A.  Single benign lymph node.               B.  Multiple stepped sections are negative for tumor.                 3.  Right [place] Lymph Node #2:                A.  Single benign lymph node.               B.  Multiple stepped sections are negative for tumor.     4.  Right [place] Lymph Node #3:                A.  Single benign lymph node.               B.  Multiple stepped sections are negative for tumor.     5.  Right Breast, Additional Lateral Margin:                A.  Benign breast parenchyma.                B.  Negative for neoplasm.        8/5/2019 - 9/3/2019 Radiation     Radiation OncologyTreatment Course:  Emily Azevedo received 5256 cGy in 21 fractions to right breast.      8/17/2019 -  Hormonal Therapy     Anastrozole (Arimidex) 1 mg by mouth daily      10/18/2019 Imaging     Left Screening MMG with [facility] ([facility]):  Scattered areas of fibroglandular density. Focal density in the left upper-outer quadrant is stable from 09/19/2017. There are no masses or suspicious calcifications.  BI-RADS 2: Benign.      6/3/2020 Imaging     Screening MMG with Daniel ([facility]):  Scattered areas of fibroglandular density.  Since the prior mammogram, the patient has had a right lumpectomy for cancer. A stellate mass in the posterior medial right breast has been removed. An anterior nodule or lymph node in  the right breast is smaller. It now measures about 7 mm in diameter compared with 11 mm in diameter. There is a central biopsy clip in the right breast.  Intramammary lymph node left breast laterally with focal parenchymal density is unchanged from 10/17/2018.   BI-RADS 2: Benign.         Review of Systems:  See interval history.       Medications:    Current Outpatient Medications:   •  anastrozole (ARIMIDEX) 1 MG tablet, Take 1 tablet by mouth Daily., Disp: 90 tablet, Rfl: 2  •  celecoxib (CeleBREX) 200 MG capsule, Take 200 mg by mouth Daily. PATIENT TO CONTACT DR. TRA NAILS REGARDING HOLDING PRIOR TO SURGERY, Disp: , Rfl:   •  cetirizine (ZyrTEC) 10 MG tablet, Take 10 mg by mouth daily., Disp: , Rfl:   •  cyclobenzaprine (FLEXERIL) 10 MG tablet, Take 0.5 tablets by mouth 3 (Three) Times a Day As Needed for Muscle Spasms., Disp: 20 tablet, Rfl: 0  •  diclofenac (VOLTAREN) 1 % gel gel, Apply 4 g topically to the appropriate area as directed 4 (Four) Times a Day As Needed. HOLD PRIOR TO SURGERY, Disp: , Rfl:   •  DULoxetine (CYMBALTA) 60 MG capsule, Take 60 mg by mouth daily. LAST DOSE 7/17/16, Disp: , Rfl:   •  irbesartan-hydrochlorothiazide (AVALIDE) 300-12.5 MG tablet, Take 1 tablet by mouth Daily., Disp: , Rfl: 0  •  levothyroxine (SYNTHROID, LEVOTHROID) 100 MCG tablet, Take 100 mcg by mouth daily., Disp: , Rfl:   •  Multiple Vitamins-Minerals (ALIVE ONCE DAILY WOMENS 50+ PO), Take  by mouth., Disp: , Rfl:   •  SAXENDA 18 MG/3ML solution pen-injector, , Disp: , Rfl:       Allergies:  No Known Allergies      Family History   Problem Relation Age of Onset   • Lymphoma Paternal Uncle 30        non hodgkins   • Lymphoma Nephew 16        non hodgkins   • Stroke Mother    • COPD Father    • Breast cancer Neg Hx    • Malig Hyperthermia Neg Hx        PHYSICAL EXAMINATION:   Vitals:    06/10/20 1001   BP: 114/76   Pulse: 82   SpO2: 96%     ECOG 0 - Asymptomatic  General: NAD, well appearing  Psych: a&o x 3, normal  mood and affect  Eyes: EOMI, no scleral icterus  ENMT: neck supple without masses or thyromegaly, mucus membranes moist  MSK: normal gait, normal ROM in bilateral shoulders  Lymph nodes: Well-healed soft right axillary scar; no cervical, supraclavicular or axillary lymphadenopathy  Breast: large size, grade 3 ptosis  Right: On inspection, the breast is slightly smaller and uplifted vs the left with a well-healed LIQ radial scar. Scar is soft. No concavity or fullness at the surgical site. No masses or nipple abnormalities.  Left: No visible abnormalities on inspection while seated, with arms raised or hands on hips. No masses, skin changes, or nipple abnormalities.      I have independently reviewed her imaging and here are my findings:   Expected postsurgical changes. No suspicious findings.      Assessment:  60 y.o. F with a diagnosis of right breast cancer: low grade, invasive ductal carcinoma, ER/TN+, Her2 negative, with associated DCIS. She is sp right partial mastectomy & SLNB on 7/8/19, pT1aN0, anatomic stage IA, prognostic stage IA. She completed radiation on 9/3/19. She is currently on Arimidex.     Plan:  -Continue f/u with Dr. Morales.   -Continue f/u with PT/LE clinic.   -F/u in 6 months for CBE.  -She was instructed to call sooner with any questions, concerns or changes on BSE.    Evelin Ayala MD      CC:  Gopal Cheng MD

## 2020-07-27 ENCOUNTER — OFFICE VISIT (OUTPATIENT)
Dept: ONCOLOGY | Facility: CLINIC | Age: 60
End: 2020-07-27

## 2020-07-27 ENCOUNTER — APPOINTMENT (OUTPATIENT)
Dept: LAB | Facility: HOSPITAL | Age: 60
End: 2020-07-27

## 2020-07-27 ENCOUNTER — LAB (OUTPATIENT)
Dept: LAB | Facility: HOSPITAL | Age: 60
End: 2020-07-27

## 2020-07-27 VITALS
BODY MASS INDEX: 40.94 KG/M2 | WEIGHT: 222.5 LBS | DIASTOLIC BLOOD PRESSURE: 67 MMHG | HEIGHT: 62 IN | OXYGEN SATURATION: 98 % | SYSTOLIC BLOOD PRESSURE: 117 MMHG | TEMPERATURE: 97.3 F | RESPIRATION RATE: 18 BRPM | HEART RATE: 78 BPM

## 2020-07-27 DIAGNOSIS — C50.311 MALIGNANT NEOPLASM OF LOWER-INNER QUADRANT OF RIGHT BREAST OF FEMALE, ESTROGEN RECEPTOR POSITIVE (HCC): Primary | ICD-10-CM

## 2020-07-27 DIAGNOSIS — Z17.0 MALIGNANT NEOPLASM OF LOWER-INNER QUADRANT OF RIGHT BREAST OF FEMALE, ESTROGEN RECEPTOR POSITIVE (HCC): Primary | ICD-10-CM

## 2020-07-27 DIAGNOSIS — Z17.0 MALIGNANT NEOPLASM OF LOWER-INNER QUADRANT OF RIGHT BREAST OF FEMALE, ESTROGEN RECEPTOR POSITIVE (HCC): ICD-10-CM

## 2020-07-27 DIAGNOSIS — C50.311 MALIGNANT NEOPLASM OF LOWER-INNER QUADRANT OF RIGHT BREAST OF FEMALE, ESTROGEN RECEPTOR POSITIVE (HCC): ICD-10-CM

## 2020-07-27 LAB
ALBUMIN SERPL-MCNC: 4.1 G/DL (ref 3.5–5.2)
ALBUMIN/GLOB SERPL: 1.3 G/DL (ref 1.1–2.4)
ALP SERPL-CCNC: 92 U/L (ref 38–116)
ALT SERPL W P-5'-P-CCNC: 31 U/L (ref 0–33)
ANION GAP SERPL CALCULATED.3IONS-SCNC: 14.1 MMOL/L (ref 5–15)
AST SERPL-CCNC: 32 U/L (ref 0–32)
BASOPHILS # BLD AUTO: 0.03 10*3/MM3 (ref 0–0.2)
BASOPHILS NFR BLD AUTO: 0.5 % (ref 0–1.5)
BILIRUB SERPL-MCNC: 0.5 MG/DL (ref 0.2–1.2)
BUN SERPL-MCNC: 23 MG/DL (ref 6–20)
BUN/CREAT SERPL: 29.5 (ref 7.3–30)
CALCIUM SPEC-SCNC: 9.9 MG/DL (ref 8.5–10.2)
CHLORIDE SERPL-SCNC: 101 MMOL/L (ref 98–107)
CO2 SERPL-SCNC: 24.9 MMOL/L (ref 22–29)
CREAT SERPL-MCNC: 0.78 MG/DL (ref 0.6–1.1)
DEPRECATED RDW RBC AUTO: 45.2 FL (ref 37–54)
EOSINOPHIL # BLD AUTO: 0.3 10*3/MM3 (ref 0–0.4)
EOSINOPHIL NFR BLD AUTO: 4.9 % (ref 0.3–6.2)
ERYTHROCYTE [DISTWIDTH] IN BLOOD BY AUTOMATED COUNT: 13.4 % (ref 12.3–15.4)
GFR SERPL CREATININE-BSD FRML MDRD: 75 ML/MIN/1.73
GLOBULIN UR ELPH-MCNC: 3.2 GM/DL (ref 1.8–3.5)
GLUCOSE SERPL-MCNC: 114 MG/DL (ref 74–124)
HCT VFR BLD AUTO: 41.2 % (ref 34–46.6)
HGB BLD-MCNC: 13.8 G/DL (ref 12–15.9)
IMM GRANULOCYTES # BLD AUTO: 0.02 10*3/MM3 (ref 0–0.05)
IMM GRANULOCYTES NFR BLD AUTO: 0.3 % (ref 0–0.5)
LYMPHOCYTES # BLD AUTO: 1.43 10*3/MM3 (ref 0.7–3.1)
LYMPHOCYTES NFR BLD AUTO: 23.5 % (ref 19.6–45.3)
MCH RBC QN AUTO: 30.9 PG (ref 26.6–33)
MCHC RBC AUTO-ENTMCNC: 33.5 G/DL (ref 31.5–35.7)
MCV RBC AUTO: 92.2 FL (ref 79–97)
MONOCYTES # BLD AUTO: 0.48 10*3/MM3 (ref 0.1–0.9)
MONOCYTES NFR BLD AUTO: 7.9 % (ref 5–12)
NEUTROPHILS NFR BLD AUTO: 3.83 10*3/MM3 (ref 1.7–7)
NEUTROPHILS NFR BLD AUTO: 62.9 % (ref 42.7–76)
NRBC BLD AUTO-RTO: 0 /100 WBC (ref 0–0.2)
PLATELET # BLD AUTO: 186 10*3/MM3 (ref 140–450)
PMV BLD AUTO: 11.9 FL (ref 6–12)
POTASSIUM SERPL-SCNC: 3.9 MMOL/L (ref 3.5–4.7)
PROT SERPL-MCNC: 7.3 G/DL (ref 6.3–8)
RBC # BLD AUTO: 4.47 10*6/MM3 (ref 3.77–5.28)
SODIUM SERPL-SCNC: 140 MMOL/L (ref 134–145)
WBC # BLD AUTO: 6.09 10*3/MM3 (ref 3.4–10.8)

## 2020-07-27 PROCEDURE — 85025 COMPLETE CBC W/AUTO DIFF WBC: CPT

## 2020-07-27 PROCEDURE — 80053 COMPREHEN METABOLIC PANEL: CPT

## 2020-07-27 PROCEDURE — 36415 COLL VENOUS BLD VENIPUNCTURE: CPT

## 2020-07-27 PROCEDURE — 99214 OFFICE O/P EST MOD 30 MIN: CPT | Performed by: NURSE PRACTITIONER

## 2020-07-27 RX ORDER — LETROZOLE 2.5 MG/1
2.5 TABLET, FILM COATED ORAL DAILY
Qty: 30 TABLET | Refills: 3 | Status: SHIPPED | OUTPATIENT
Start: 2020-07-27 | End: 2020-11-09 | Stop reason: SDUPTHER

## 2020-07-27 NOTE — PROGRESS NOTES
Subjective     REASON FOR FOLLOW UP:   T1 a N0 M0 invasive ductal carcinoma of the right breast.  Patient is status post lumpectomy with sentinel lymph node surgery on July 2019.  She is ER positive, AL positive HER-2/berenice negative.  · XRT from 8/5/2019 - 9/3/2019  · 08/17/19 - started Arimidex                             HISTORY OF PRESENT ILLNESS:  The patient is a 60 y.o. year old female the above-mentioned history who is here today for follow-up visit, continuing on Arimidex.  She does complain of worsening arthralgias, particularly in her hips.  She is also having significant hot flashes.   She typically takes Celebrex for her arthritis, and is not helping.    Patient just had her mammogram in June and follow-up with Dr. Ayala.  Mammogram was negative.  Patient denies any new palpable breast masses or nodularity, nipple discharge or bleeding, or skin changes.    ONCOLOGIC HISTORY  Back in October 2019 she had a mammogram which showed an abnormality in the right breast, which showed a new irregular nodular opacity in the deep medial right breast near the chest wall measuring over 1 cm in size.  She had circumscribed benign appearing nodule in the medial right breast near the nipple which is also new.  Likely thought to be a cyst.  Diagnostic mammogram was suggested.  Patient then underwent diagnostic mammogram November 7, 2018 and was thought to represent fibroglandular tissue.  Ultrasound showed no abnormality in the medial right breast that was seen on screening mammogram.  However a six-month follow-up suggested.    Repeat diagnostic mammogram Sabra 3, 2019 was abnormal and biopsy confirmed invasive ductal carcinoma, grade 1, ER AL positive HER-2/berenice negative.  Subsequently patient underwent lumpectomy on July 8, 2019 with sentinel lymph node biopsy.    9/19/2018: Bilateral screening mammogram  IMPRESSION:  1. New irregular nodular opacity in the deep medial right breast.  Suspicious imaging features. Recall  for additional diagnostic imaging is  recommended.  2. Circumscribed benign-appearing nodule in the medial right breast near  the nipple is also new. Ultrasound imaging of this nodule is recommended  to evaluate for a probable cyst or similar benign nodule.    November 7, 2018: Right diagnostic mammogram  MAMMOGRAM FINDINGS:  Small focal ill-defined opacity is again noted within the deep medial  right breast along the 3:00 axis. This is visible in retrospect on each  of the prior studies, and may therefore potentially represent focally  prominent normal fibroglandular tissue. Ultrasound imaging was then  Performed.    November 7, 2019: Ultrasound of right breast  IMPRESSION:  1. Probably benign examination.  2. Small ill-defined opacity within the deep medial right breast showing  no corresponding abnormality on directed ultrasound imaging today.  3. Follow-up unilateral mammogram in six months is indicated.    Sabra 3, 2019: Right diagnostic mammogram and right breast ultrasound  MAMMOGRAM FINDINGS:  Small focal irregular opacity is again noted along the 3:00 axis in the  deep portion of the right breast about 9 cm from the nipple measuring  about 8 mm. Linear opacities surround the lesion in a radial pattern.  The morphology is concerning, particularly on tomogram images today.     ULTRASOUND FINDINGS:  High-resolution grayscale ultrasound imaging directed to this portion of  the breast today does show a subtle focus of acoustic shadowing,  although mass is not clearly delineated.     IMPRESSION:  1.  Small irregular lesion in the deep medial right breast with  suspicious mammographic features, difficult to identify by ultrasound.  2.  Stereotactic core needle biopsy of the lesion is recommended for  further assessment.    June 13, 2019: Stereotactic biopsy of the right breast medial 3 o'clock position lesion shows invasive carcinoma of no special type, ductal, 2.8 mm, grade 1, Sanford score of 3 with associated  DCIS with cribriform and micropapillary, low nuclear grade,    ER: %  RI: 41-50%  HER-2/berenice: 1+ negative  Ki-67 1%    June 21, 2019: MRI breast bilateral  IMPRESSION:  Small marking clip and postbiopsy changes in the lower inner  quadrant of the right breast as described in more detail above. There is  no MRI evidence of residual neoplasm. There are no suspicious findings  in the left breast.    July 8, 2019: Status post right needle localized partial mastectomy with sentinel lymph node biopsy  Synoptic Checklist    INVASIVE CARCINOMA OF THE BREAST (Breast Invasive - All Specimens)  Resulting Labs  [phone]  CLINICAL  Radiologic Finding Mass or architectural distortion   [facility],  [address] [address]  PATIENT: Emily Azevedo  MRN: [id number] CSN: [id number]  Page: 2 of 4 Printed: 7/10/2019 11:35 AM  Right  .  TUMOR  Tumor Site: Invasive Carcinoma Upper inner quadrant  Clock Position of Tumor Site 3 o'clock  Histologic Type Invasive carcinoma of no special type (ductal, not otherwise  specified)  Histologic Grade ([place] Histologic Score) No residual invasive carcinoma  Tumor Size Greatest dimension of largest invasive focus in Millimeters (mm): 2.8  mm Millimeters (mm)  Tumor Focality Single focus of invasive carcinoma  Ductal Carcinoma In Situ (DCIS) Present  Negative for extensive intraductal component (EIC)  Size (Extent) of DCIS Estimated size of DCIS greatest dimension in Millimeters (mm) is at  least: 7 mm Millimeters (mm)  Number of Blocks with DCIS 2  Number of Blocks Examined 27  Architectural Patterns Cribriform  Nuclear Grade Grade I (low)  Necrosis Not identified  Lobular Carcinoma In Situ (LCIS) No LCIS in specimen  Tumor Extent  Accessory Findings  Lymphovascular Invasion Not identified  Dermal Lymphovascular Invasion No skin present  Microcalcifications Not identified  Treatment Effect No known presurgical  therapy  .  MARGINS  DCIS Margins Uninvolved by DCIS  Distance of DCIS to Anterior Margin in Millimeters (mm) 1.1 Millimeters (mm)  Distance of DCIS to Posterior Margin in Millimeters (mm) 32 Millimeters (mm)  Distance of DCIS to Superior Margin in Millimeters (mm) 5.5 Millimeters (mm)  Distance of DCIS to Inferior Margin in Millimeters (mm) 5.4 Millimeters (mm)  Distance of DCIS to Medial Margin in Millimeters (mm) 40 Millimeters (mm)  Distance of DCIS to Lateral Margin in Millimeters (mm) 21 Millimeters (mm)  Distance of DCIS from Closest Margin in Millimeters (mm) 1.1 mm Millimeters (mm)  Closest Margin Anterior  .  LYMPH NODES  Regional Lymph Nodes Uninvolved by tumor cells  Number of Lymph Nodes Examined 3  Number of [place] Nodes Examined 3  .  PATHOLOGIC STAGE CLASSIFICATION (pTNM, AJCC 8th Edition)  TNM Descriptors Not applicable  Primary Tumor (Invasive Carcinoma) (pT) pT1a  Regional Lymph Nodes (pN)  Modifier (sn): Only sentinel node(s) evaluated.  Category (pN) pN0  .e to discuss further options of treat  Patient is here to discuss further options of treatment.  She has a T1 a N0 ER [place] positive HER-2 negative tumor in the right breast.  She has no family history of breast cancer.    XRT from 8/5/2019 - 9/3/2019  08/17/19 - started Arimidex    Past Medical History:   Diagnosis Date   • Arthritis    • Carpal tunnel syndrome    • Disease of thyroid gland     Hypothyroidism   • Graves disease    • Hearing loss     earing aids    • History of urinary tract infection    • Hypertension    • Malignant neoplasm of lower-inner quadrant of right breast of female, estrogen receptor positive (CMS/HCC) 6/24/2019   • Palpitations     Dr. Cheng PMD Benign    • Seasonal allergies    • Vertigo         Past Surgical History:   Procedure Laterality Date   • BREAST BIOPSY Right     MALIGNANT   • BREAST BIOPSY Right 2013    BENIGN   • BREAST LUMPECTOMY Right 7/8/2019    Procedure: BREAST LUMPECTOMY WITH SENTINEL NODE BIOPSY  AND NEEDLE LOCALIZATION;  Surgeon: Evelin Ayala MD;  Location:  [facility] [place];  Service: General   • CARPAL TUNNEL RELEASE Right    • CERVICAL CONE BIOPSY     • CHOLECYSTECTOMY  1983   • COLONOSCOPY     • GALLBLADDER SURGERY     • HYSTERECTOMY  2016   • LAPAROSCOPIC TUBAL LIGATION     • OOPHORECTOMY     • CA TOTAL ABDOM HYSTERECTOMY Bilateral 7/28/2016    Procedure: TOTAL ABDOMINAL HYSTERECTOMY W/ BSO ;  Surgeon: George Strange MD;  Location: [facility] MAIN OR;  Service: Obstetrics/Gynecology   • ROTATOR CUFF REPAIR Right    • TONSILLECTOMY  1963        Current Outpatient Medications on File Prior to Visit   Medication Sig Dispense Refill   • celecoxib (CeleBREX) 200 MG capsule Take 200 mg by mouth Daily. PATIENT TO CONTACT DR. EVELIN AYALA REGARDING HOLDING PRIOR TO SURGERY     • cetirizine (ZyrTEC) 10 MG tablet Take 10 mg by mouth daily.     • cyclobenzaprine (FLEXERIL) 10 MG tablet Take 0.5 tablets by mouth 3 (Three) Times a Day As Needed for Muscle Spasms. 20 tablet 0   • diclofenac (VOLTAREN) 1 % gel gel Apply 4 g topically to the appropriate area as directed 4 (Four) Times a Day As Needed. HOLD PRIOR TO SURGERY     • DULoxetine (CYMBALTA) 60 MG capsule Take 60 mg by mouth daily. LAST DOSE 7/17/16     • irbesartan-hydrochlorothiazide (AVALIDE) 300-12.5 MG tablet Take 1 tablet by mouth Daily.  0   • levothyroxine (SYNTHROID, LEVOTHROID) 100 MCG tablet Take 100 mcg by mouth daily.     • Multiple Vitamins-Minerals (ALIVE ONCE DAILY WOMENS 50+ PO) Take  by mouth.     • SAXENDA 18 MG/3ML solution pen-injector      • [DISCONTINUED] anastrozole (ARIMIDEX) 1 MG tablet Take 1 tablet by mouth Daily. 90 tablet 2     No current facility-administered medications on file prior to visit.         ALLERGIES:  No Known Allergies     Social History     Socioeconomic History   • Marital status:      Spouse name: Mendez   • Number of children: 2   • Years of education: High school   • Highest education  "level: Not on file   Occupational History     Employer: RETIRED   Tobacco Use   • Smoking status: Never Smoker   • Smokeless tobacco: Never Used   Substance and Sexual Activity   • Alcohol use: No     Frequency: Never     Comment: maybe once a year   • Drug use: No   • Sexual activity: Yes     Partners: Male     Birth control/protection: Surgical     Comment:    Social History Narrative    2 sons        Family History   Problem Relation Age of Onset   • Lymphoma Paternal Uncle 30        non hodgkins   • Lymphoma Nephew 16        non hodgkins   • Stroke Mother    • COPD Father    • Breast cancer Neg Hx    • Malig Hyperthermia Neg Hx         Review of Systems   Constitutional: Negative for activity change, appetite change, chills, fatigue and fever.   HENT: Negative for mouth sores, nosebleeds and trouble swallowing.    Respiratory: Negative for cough and shortness of breath.    Cardiovascular: Negative for chest pain and leg swelling.   Gastrointestinal: Negative for abdominal pain, constipation, diarrhea, nausea and vomiting.   Endocrine: Positive for heat intolerance ( Hot flashes).   Genitourinary: Negative for difficulty urinating.   Musculoskeletal: Positive for arthralgias.   Skin: Negative for rash.   Neurological: Negative for dizziness, weakness and numbness.   Hematological: Negative for adenopathy. Does not bruise/bleed easily.   Psychiatric/Behavioral: Negative for sleep disturbance.        Objective     Vitals:    07/27/20 0915   BP: 117/67   Pulse: 78   Resp: 18   Temp: 97.3 °F (36.3 °C)   TempSrc: Temporal   SpO2: 98%   Weight: 101 kg (222 lb 8 oz)   Height: 157.5 cm (62.01\")   PainSc: 0-No pain      Current Status 7/27/2020   ECOG score 0       Physical Exam   Constitutional: She is oriented to person, place, and time. She appears well-developed and well-nourished. No distress.   HENT:   Head: Normocephalic and atraumatic.   Mouth/Throat: Oropharynx is clear and moist and mucous membranes are " normal. No oropharyngeal exudate.   Eyes: Pupils are equal, round, and reactive to light.   Neck: Normal range of motion.   Cardiovascular: Normal rate, regular rhythm and normal heart sounds.   No murmur heard.  Pulmonary/Chest: Effort normal and breath sounds normal. No respiratory distress. She has no wheezes. She has no rhonchi. She has no rales.   Abdominal: Soft. Normal appearance and bowel sounds are normal. She exhibits no distension. There is no hepatosplenomegaly.   Musculoskeletal: Normal range of motion. She exhibits no edema.   Neurological: She is alert and oriented to person, place, and time.   Skin: Skin is warm and dry. No rash noted.   Psychiatric: She has a normal mood and affect.   Vitals reviewed.    Patient screened negative for depression based on a PHQ-9 score of 0 on 7/27/2020.        RECENT LABS:  Hematology WBC   Date Value Ref Range Status   07/27/2020 6.09 3.40 - 10.80 10*3/mm3 Final   05/09/2018 7.39 4.5 - 11.0 10*3/uL Final     RBC   Date Value Ref Range Status   07/27/2020 4.47 3.77 - 5.28 10*6/mm3 Final   05/09/2018 4.86 4.0 - 5.2 10*6/uL Final     Hemoglobin   Date Value Ref Range Status   07/27/2020 13.8 12.0 - 15.9 g/dL Final   05/09/2018 15.0 12.0 - 16.0 g/dL Final     Hematocrit   Date Value Ref Range Status   07/27/2020 41.2 34.0 - 46.6 % Final   05/09/2018 45.9 36.0 - 46.0 % Final     Platelets   Date Value Ref Range Status   07/27/2020 186 140 - 450 10*3/mm3 Final   05/09/2018 243 140 - 440 10*3/uL Final          EXAM, 06/03/2020:  1. Bilateral digital screening mammogram with CAD.  2. Bilateral digital screening breast tomosynthesis.     INDICATION:  Routine Screening  Family History: Routine screening. No family history breast cancer.  Right breast cancer in July 2019     TECHNIQUE:  Bilateral digital screening mammogram images were obtained including  digital breast tomosynthesis and CAD review.     COMPARISON:  Screening mammogram, 10/18/2019, 07/08/2019, 06/03/2019  10/17/2018     FINDINGS:  There are scattered areas of fibroglandular density.   Since the prior mammogram, the patient has had a right lumpectomy for  cancer. A stellate mass in the posterior medial right breast has been  removed. An anterior nodule or lymph node in the right breast is  smaller. It now measures about 7 mm in diameter compared with 11 mm in  diameter. There is a central biopsy clip in the right breast.     Intramammary lymph node left breast laterally with focal parenchymal  density is unchanged from 10/17/2018     IMPRESSION:  Status post lumpectomy since prior examination. No evidence  of new or residual malignancy.. Left breast is stable.     BI-RADS CATEGORY 2: Benign Findings.  Recommend routine screening mammogram    Assessment/Plan     1.  T1 a N0 M0 invasive ductal carcinoma of the right breast.  Patient is status post lumpectomy with sentinel lymph node surgery on July 2019.  She is ER positive, [place] positive HER-2/berenice negative.  -September 19, 2018 screening mammogram showed new irregular nodular density in the deep medial right breast which is suspicious and six-month follow-up suggested.  -November 7, 2018, patient had right diagnostic mammogram which showed small ill-defined opacity in the deep medial right breast but ultrasound did not show that and six-month follow-up suggested  -Sabra 3, 2019, small irregular opacity in the 3:00 axis in the upper medial right breast, 8 mm, suspicious  -Biopsy consistent with invasive ductal carcinoma, 2.8 mm, grade 1, [place] score of 3, associated DCIS, micro papillary and cribriform, ER +%, ND +41-50%, HER-2/berenice 1+ negative, Ki-67 1%  -July 2019, status post right lumpectomy with sentinel lymph nodes, 3 o'clock position, no invasive carcinoma identified on the lumpectomy, single focus of invasive carcinoma 2.8 mm seen at biopsy, DCIS present 7 mm, DCIS in 2 of the 27 blocks, no lymphovascular space invasion, margins clear except close  anterior margin from the DCIS is 1.1 mm, 3 lymph nodes negative    · Had a lengthy discussion with patient that she does not require any chemotherapy given the small size, low-grade nature of the tumor with it being ER [place] positive HER-2/berenice negative.  · XRT from 8/5/2019 - 9/3/2019  · 08/17/19 - started Arimidex  · 09/30/19 - Patient reports having gained about 20 pounds since the diagnosis of her cancer.  As a result, she also has arthralgia.  · 7/27/2020 patient complained of worsening arthralgias, particularly in her hips since starting Arimidex.  After discussion with Dr. Morales, we will have her hold Arimidex x1 month.  Then start Femara 2.5 mg daily.  I will see her back in 2 months for reevaluation.    2.  Osteopenia, reviewed bone density from December 2017 and she does have osteopenia.  We encouraged her to take calcium and vitamin D supplements.  · DEXA scan December 2019 normal bone density.    3.  Lifestyle modification: Discussed in length with patient to exercise 40 minutes/day 5 days a week with 20 minutes of aerobics.  Also encouraged healthy diet.    4.  Referral to survivorship clinic    5.  Osteoarthritis    6.  Hot flashes secondary to Arimidex.    Plan   1. Hold Arimidex for 1 month.  2. In 1 month if symptoms have improved start Femara 2.5 mg daily.  3. Return for follow-up visit with nurse practitioner in 2 months for reevaluation.  4. Dr. Morales would like to see the patient back in 4 months.    5. Continue calcium 600 mg twice daily and vitamin D 1000 IU daily.        Kelsey Guaman, APRN  07/27/2020        CC:  MD Evelin Cortez MD Crystal McMahan, MD Robert Schweitzer, MD Janet Leon, MD

## 2020-08-25 ENCOUNTER — TELEPHONE (OUTPATIENT)
Dept: PHARMACY | Facility: HOSPITAL | Age: 60
End: 2020-08-25

## 2020-08-25 RX ORDER — ANASTROZOLE 1 MG/1
TABLET ORAL
OUTPATIENT
Start: 2020-08-25

## 2020-08-25 NOTE — TELEPHONE ENCOUNTER
Called pt to ask if pt was needing the Arimidex right now or if she was going to be starting the Femara. She stating that she was going to be starting the Femara at the end of the month and try it out for a month. But, that she might need the Arimidex later on. I told her that I would deny this request but if she needed it in the future to call it in again. She v/u.

## 2020-09-17 DIAGNOSIS — C50.311 MALIGNANT NEOPLASM OF LOWER-INNER QUADRANT OF RIGHT BREAST OF FEMALE, ESTROGEN RECEPTOR POSITIVE (HCC): Primary | ICD-10-CM

## 2020-09-17 DIAGNOSIS — Z17.0 MALIGNANT NEOPLASM OF LOWER-INNER QUADRANT OF RIGHT BREAST OF FEMALE, ESTROGEN RECEPTOR POSITIVE (HCC): Primary | ICD-10-CM

## 2020-09-22 ENCOUNTER — LAB (OUTPATIENT)
Dept: LAB | Facility: HOSPITAL | Age: 60
End: 2020-09-22

## 2020-09-22 ENCOUNTER — OFFICE VISIT (OUTPATIENT)
Dept: ONCOLOGY | Facility: CLINIC | Age: 60
End: 2020-09-22

## 2020-09-22 VITALS
TEMPERATURE: 97.5 F | RESPIRATION RATE: 18 BRPM | SYSTOLIC BLOOD PRESSURE: 121 MMHG | HEART RATE: 74 BPM | WEIGHT: 221.6 LBS | OXYGEN SATURATION: 95 % | HEIGHT: 62 IN | DIASTOLIC BLOOD PRESSURE: 78 MMHG | BODY MASS INDEX: 40.78 KG/M2

## 2020-09-22 DIAGNOSIS — Z17.0 MALIGNANT NEOPLASM OF LOWER-INNER QUADRANT OF RIGHT BREAST OF FEMALE, ESTROGEN RECEPTOR POSITIVE (HCC): Primary | ICD-10-CM

## 2020-09-22 DIAGNOSIS — C50.311 MALIGNANT NEOPLASM OF LOWER-INNER QUADRANT OF RIGHT BREAST OF FEMALE, ESTROGEN RECEPTOR POSITIVE (HCC): Primary | ICD-10-CM

## 2020-09-22 DIAGNOSIS — Z17.0 MALIGNANT NEOPLASM OF LOWER-INNER QUADRANT OF RIGHT BREAST OF FEMALE, ESTROGEN RECEPTOR POSITIVE (HCC): ICD-10-CM

## 2020-09-22 DIAGNOSIS — C50.311 MALIGNANT NEOPLASM OF LOWER-INNER QUADRANT OF RIGHT BREAST OF FEMALE, ESTROGEN RECEPTOR POSITIVE (HCC): ICD-10-CM

## 2020-09-22 LAB
ALBUMIN SERPL-MCNC: 4.3 G/DL (ref 3.5–5.2)
ALBUMIN/GLOB SERPL: 1.3 G/DL (ref 1.1–2.4)
ALP SERPL-CCNC: 99 U/L (ref 38–116)
ALT SERPL W P-5'-P-CCNC: 29 U/L (ref 0–33)
ANION GAP SERPL CALCULATED.3IONS-SCNC: 9.7 MMOL/L (ref 5–15)
AST SERPL-CCNC: 33 U/L (ref 0–32)
BASOPHILS # BLD AUTO: 0.04 10*3/MM3 (ref 0–0.2)
BASOPHILS NFR BLD AUTO: 0.7 % (ref 0–1.5)
BILIRUB SERPL-MCNC: 0.4 MG/DL (ref 0.2–1.2)
BUN SERPL-MCNC: 16 MG/DL (ref 6–20)
BUN/CREAT SERPL: 20.3 (ref 7.3–30)
CALCIUM SPEC-SCNC: 10.1 MG/DL (ref 8.5–10.2)
CHLORIDE SERPL-SCNC: 102 MMOL/L (ref 98–107)
CO2 SERPL-SCNC: 30.3 MMOL/L (ref 22–29)
CREAT SERPL-MCNC: 0.79 MG/DL (ref 0.6–1.1)
DEPRECATED RDW RBC AUTO: 46 FL (ref 37–54)
EOSINOPHIL # BLD AUTO: 0.41 10*3/MM3 (ref 0–0.4)
EOSINOPHIL NFR BLD AUTO: 6.9 % (ref 0.3–6.2)
ERYTHROCYTE [DISTWIDTH] IN BLOOD BY AUTOMATED COUNT: 13.2 % (ref 12.3–15.4)
GFR SERPL CREATININE-BSD FRML MDRD: 74 ML/MIN/1.73
GLOBULIN UR ELPH-MCNC: 3.2 GM/DL (ref 1.8–3.5)
GLUCOSE SERPL-MCNC: 86 MG/DL (ref 74–124)
HCT VFR BLD AUTO: 44.2 % (ref 34–46.6)
HGB BLD-MCNC: 14.2 G/DL (ref 12–15.9)
IMM GRANULOCYTES # BLD AUTO: 0.02 10*3/MM3 (ref 0–0.05)
IMM GRANULOCYTES NFR BLD AUTO: 0.3 % (ref 0–0.5)
LYMPHOCYTES # BLD AUTO: 1.45 10*3/MM3 (ref 0.7–3.1)
LYMPHOCYTES NFR BLD AUTO: 24.2 % (ref 19.6–45.3)
MCH RBC QN AUTO: 30.5 PG (ref 26.6–33)
MCHC RBC AUTO-ENTMCNC: 32.1 G/DL (ref 31.5–35.7)
MCV RBC AUTO: 95.1 FL (ref 79–97)
MONOCYTES # BLD AUTO: 0.46 10*3/MM3 (ref 0.1–0.9)
MONOCYTES NFR BLD AUTO: 7.7 % (ref 5–12)
NEUTROPHILS NFR BLD AUTO: 3.6 10*3/MM3 (ref 1.7–7)
NEUTROPHILS NFR BLD AUTO: 60.2 % (ref 42.7–76)
NRBC BLD AUTO-RTO: 0 /100 WBC (ref 0–0.2)
PLATELET # BLD AUTO: 242 10*3/MM3 (ref 140–450)
PMV BLD AUTO: 10.8 FL (ref 6–12)
POTASSIUM SERPL-SCNC: 4.3 MMOL/L (ref 3.5–4.7)
PROT SERPL-MCNC: 7.5 G/DL (ref 6.3–8)
RBC # BLD AUTO: 4.65 10*6/MM3 (ref 3.77–5.28)
SODIUM SERPL-SCNC: 142 MMOL/L (ref 134–145)
WBC # BLD AUTO: 5.98 10*3/MM3 (ref 3.4–10.8)

## 2020-09-22 PROCEDURE — 85025 COMPLETE CBC W/AUTO DIFF WBC: CPT

## 2020-09-22 PROCEDURE — 36415 COLL VENOUS BLD VENIPUNCTURE: CPT

## 2020-09-22 PROCEDURE — 80053 COMPREHEN METABOLIC PANEL: CPT

## 2020-09-22 PROCEDURE — 99213 OFFICE O/P EST LOW 20 MIN: CPT | Performed by: NURSE PRACTITIONER

## 2020-09-22 NOTE — PROGRESS NOTES
Subjective     REASON FOR FOLLOW UP:   T1 a N0 M0 invasive ductal carcinoma of the right breast.  Patient is status post lumpectomy with sentinel lymph node surgery on July 2019.  She is ER positive, MA positive HER-2/berenice negative.  · XRT from 8/5/2019 - 9/3/2019  · 08/17/19 - started Arimidex                             HISTORY OF PRESENT ILLNESS:  The patient is a 60 y.o. year old female with the above-mentioned issue is here today for short interval follow-up.  She was previously taken Arimidex, and had to discontinue it due to worsening arthralgias.  Her symptoms improved off the Arimidex.  She waited about a month and then started on letrozole 2.5 mg daily.  She has been taking that for about 1 month, and so far is tolerating quite well.  She does have some mild hot flashes, but they are tolerable.  She denies any worsening in her arthralgias.  She does continue on Celebrex for her arthritis.    ONCOLOGIC HISTORY  Back in October 2019 she had a mammogram which showed an abnormality in the right breast, which showed a new irregular nodular opacity in the deep medial right breast near the chest wall measuring over 1 cm in size.  She had circumscribed benign appearing nodule in the medial right breast near the nipple which is also new.  Likely thought to be a cyst.  Diagnostic mammogram was suggested.  Patient then underwent diagnostic mammogram November 7, 2018 and was thought to represent fibroglandular tissue.  Ultrasound showed no abnormality in the medial right breast that was seen on screening mammogram.  However a six-month follow-up suggested.    Repeat diagnostic mammogram Sabra 3, 2019 was abnormal and biopsy confirmed invasive ductal carcinoma, grade 1, ER MA positive HER-2/berenice negative.  Subsequently patient underwent lumpectomy on July 8, 2019 with sentinel lymph node biopsy.    9/19/2018: Bilateral screening mammogram  IMPRESSION:  1. New irregular nodular opacity in the deep medial right  breast.  Suspicious imaging features. Recall for additional diagnostic imaging is  recommended.  2. Circumscribed benign-appearing nodule in the medial right breast near  the nipple is also new. Ultrasound imaging of this nodule is recommended  to evaluate for a probable cyst or similar benign nodule.    November 7, 2018: Right diagnostic mammogram  MAMMOGRAM FINDINGS:  Small focal ill-defined opacity is again noted within the deep medial  right breast along the 3:00 axis. This is visible in retrospect on each  of the prior studies, and may therefore potentially represent focally  prominent normal fibroglandular tissue. Ultrasound imaging was then  Performed.    November 7, 2019: Ultrasound of right breast  IMPRESSION:  1. Probably benign examination.  2. Small ill-defined opacity within the deep medial right breast showing  no corresponding abnormality on directed ultrasound imaging today.  3. Follow-up unilateral mammogram in six months is indicated.    Sabra 3, 2019: Right diagnostic mammogram and right breast ultrasound  MAMMOGRAM FINDINGS:  Small focal irregular opacity is again noted along the 3:00 axis in the  deep portion of the right breast about 9 cm from the nipple measuring  about 8 mm. Linear opacities surround the lesion in a radial pattern.  The morphology is concerning, particularly on tomogram images today.     ULTRASOUND FINDINGS:  High-resolution grayscale ultrasound imaging directed to this portion of  the breast today does show a subtle focus of acoustic shadowing,  although mass is not clearly delineated.     IMPRESSION:  1.  Small irregular lesion in the deep medial right breast with  suspicious mammographic features, difficult to identify by ultrasound.  2.  Stereotactic core needle biopsy of the lesion is recommended for  further assessment.    June 13, 2019: Stereotactic biopsy of the right breast medial 3 o'clock position lesion shows invasive carcinoma of no special type, ductal, 2.8 mm,  grade 1, Meghan score of 3 with associated DCIS with cribriform and micropapillary, low nuclear grade,    ER: %  [place]: 41-50%  HER-2/berenice: 1+ negative  Ki-67 1%    June 21, 2019: MRI breast bilateral  IMPRESSION:  Small marking clip and postbiopsy changes in the lower inner  quadrant of the right breast as described in more detail above. There is  no MRI evidence of residual neoplasm. There are no suspicious findings  in the left breast.    July 8, 2019: Status post right needle localized partial mastectomy with sentinel lymph node biopsy  Synoptic Checklist    INVASIVE CARCINOMA OF THE BREAST (Breast Invasive - All Specimens)  Resulting Labs  [phone]  CLINICAL  Radiologic Finding Mass or architectural distortion  [facility],  [address] [place] [address]  PATIENT: Emily Azevedo  MRN: [id number] CSN: [id number]  Page: 2 of 4 Printed: 7/10/2019 11:35 AM  Right  .  TUMOR  Tumor Site: Invasive Carcinoma Upper inner quadrant  Clock Position of Tumor Site 3 o'clock  Histologic Type Invasive carcinoma of no special type (ductal, not otherwise  specified)  Histologic Grade (Meghan Histologic Score) No residual invasive carcinoma  Tumor Size Greatest dimension of largest invasive focus in Millimeters (mm): 2.8  mm Millimeters (mm)  Tumor Focality Single focus of invasive carcinoma  Ductal Carcinoma In Situ (DCIS) Present  Negative for extensive intraductal component (EIC)  Size (Extent) of DCIS Estimated size of DCIS greatest dimension in Millimeters (mm) is at  least: 7 mm Millimeters (mm)  Number of Blocks with DCIS 2  Number of Blocks Examined 27  Architectural Patterns Cribriform  Nuclear Grade Grade I (low)  Necrosis Not identified  Lobular Carcinoma In Situ (LCIS) No LCIS in specimen  Tumor Extent  Accessory Findings  Lymphovascular Invasion Not identified  Dermal Lymphovascular Invasion No skin present  Microcalcifications Not identified  Treatment  Effect No known presurgical therapy  .  MARGINS  DCIS Margins Uninvolved by DCIS  Distance of DCIS to Anterior Margin in Millimeters (mm) 1.1 Millimeters (mm)  Distance of DCIS to Posterior Margin in Millimeters (mm) 32 Millimeters (mm)  Distance of DCIS to Superior Margin in Millimeters (mm) 5.5 Millimeters (mm)  Distance of DCIS to Inferior Margin in Millimeters (mm) 5.4 Millimeters (mm)  Distance of DCIS to Medial Margin in Millimeters (mm) 40 Millimeters (mm)  Distance of DCIS to Lateral Margin in Millimeters (mm) 21 Millimeters (mm)  Distance of DCIS from Closest Margin in Millimeters (mm) 1.1 mm Millimeters (mm)  Closest Margin Anterior  .  LYMPH NODES  Regional Lymph Nodes Uninvolved by tumor cells  Number of Lymph Nodes Examined 3  Number of [place] Nodes Examined 3  .  PATHOLOGIC STAGE CLASSIFICATION (pTNM, AJCC 8th Edition)  TNM Descriptors Not applicable  Primary Tumor (Invasive Carcinoma) (pT) pT1a  Regional Lymph Nodes (pN)  Modifier (sn): Only sentinel node(s) evaluated.  Category (pN) pN0  .e to discuss further options of treat  Patient is here to discuss further options of treatment.  She has a T1 a N0 ER CT positive HER-2 negative tumor in the right breast.  She has no family history of breast cancer.    XRT from 8/5/2019 - 9/3/2019  08/17/19 - started Arimidex    Past Medical History:   Diagnosis Date   • Arthritis    • Carpal tunnel syndrome    • Disease of thyroid gland     Hypothyroidism   • Graves disease    • Hearing loss     earing aids    • History of urinary tract infection    • Hypertension    • Malignant neoplasm of lower-inner quadrant of right breast of female, estrogen receptor positive (CMS/HCC) 6/24/2019   • Palpitations     Dr. Cheng PMD Benign    • Seasonal allergies    • Vertigo         Past Surgical History:   Procedure Laterality Date   • BREAST BIOPSY Right     MALIGNANT   • BREAST BIOPSY Right 2013    BENIGN   • BREAST LUMPECTOMY Right 7/8/2019    Procedure: BREAST LUMPECTOMY  WITH SENTINEL NODE BIOPSY AND NEEDLE LOCALIZATION;  Surgeon: Evelin Ayala MD;  Location:  [facility] [place];  Service: General   • CARPAL TUNNEL RELEASE Right    • CERVICAL CONE BIOPSY     • CHOLECYSTECTOMY  1983   • COLONOSCOPY     • GALLBLADDER SURGERY     • HYSTERECTOMY  2016   • LAPAROSCOPIC TUBAL LIGATION     • OOPHORECTOMY     • AR TOTAL ABDOM HYSTERECTOMY Bilateral 7/28/2016    Procedure: TOTAL ABDOMINAL HYSTERECTOMY W/ BSO ;  Surgeon: George Strange MD;  Location: [facility] MAIN OR;  Service: Obstetrics/Gynecology   • ROTATOR CUFF REPAIR Right    • TONSILLECTOMY  1963        Current Outpatient Medications on File Prior to Visit   Medication Sig Dispense Refill   • celecoxib (CeleBREX) 200 MG capsule Take 200 mg by mouth Daily. PATIENT TO CONTACT DR. EVELIN AYALA REGARDING HOLDING PRIOR TO SURGERY     • cetirizine (ZyrTEC) 10 MG tablet Take 10 mg by mouth daily.     • cyclobenzaprine (FLEXERIL) 10 MG tablet Take 0.5 tablets by mouth 3 (Three) Times a Day As Needed for Muscle Spasms. 20 tablet 0   • diclofenac (VOLTAREN) 1 % gel gel Apply 4 g topically to the appropriate area as directed 4 (Four) Times a Day As Needed. HOLD PRIOR TO SURGERY     • DULoxetine (CYMBALTA) 60 MG capsule Take 60 mg by mouth daily. LAST DOSE 7/17/16     • irbesartan-hydrochlorothiazide (AVALIDE) 300-12.5 MG tablet Take 1 tablet by mouth Daily.  0   • letrozole (FEMARA) 2.5 MG tablet Take 1 tablet by mouth Daily. 30 tablet 3   • levothyroxine (SYNTHROID, LEVOTHROID) 100 MCG tablet Take 100 mcg by mouth daily.     • Multiple Vitamins-Minerals (ALIVE ONCE DAILY WOMENS 50+ PO) Take  by mouth.     • SAXENDA 18 MG/3ML solution pen-injector        No current facility-administered medications on file prior to visit.         ALLERGIES:  No Known Allergies     Social History     Socioeconomic History   • Marital status:      Spouse name: Mendez   • Number of children: 2   • Years of education: High school   • Highest  "education level: Not on file   Occupational History     Employer: RETIRED   Tobacco Use   • Smoking status: Never Smoker   • Smokeless tobacco: Never Used   Substance and Sexual Activity   • Alcohol use: No     Frequency: Never     Comment: maybe once a year   • Drug use: No   • Sexual activity: Yes     Partners: Male     Birth control/protection: Surgical     Comment:    Social History Narrative    2 sons        Family History   Problem Relation Age of Onset   • Lymphoma Paternal Uncle 30        non hodgkins   • Lymphoma Nephew 16        non hodgkins   • Stroke Mother    • COPD Father    • Breast cancer Neg Hx    • Malig Hyperthermia Neg Hx         Review of Systems   Constitutional: Negative for activity change, appetite change, chills, fatigue and fever.   HENT: Negative for mouth sores, nosebleeds and trouble swallowing.    Respiratory: Negative for cough and shortness of breath.    Cardiovascular: Negative for chest pain and leg swelling.   Gastrointestinal: Negative for abdominal pain, constipation, diarrhea, nausea and vomiting.   Endocrine: Positive for heat intolerance ( Hot flashes).   Genitourinary: Negative for difficulty urinating.   Musculoskeletal: Positive for arthralgias ( See HPI).   Skin: Negative for rash.   Neurological: Negative for dizziness, weakness and numbness.   Hematological: Negative for adenopathy. Does not bruise/bleed easily.   Psychiatric/Behavioral: Negative for sleep disturbance.   9/22/2020 ROS unchanged from above except as updated.    Objective     Vitals:    09/22/20 1022   BP: 121/78   Pulse: 74   Resp: 18   Temp: 97.5 °F (36.4 °C)   TempSrc: Temporal   SpO2: 95%   Weight: 101 kg (221 lb 9.6 oz)   Height: 157.5 cm (62.01\")   PainSc: 0-No pain      Current Status 9/22/2020   ECOG score 0       Physical Exam   Constitutional: She is oriented to person, place, and time. She appears well-developed. No distress.   HENT:   Head: Normocephalic and atraumatic. "   Pulmonary/Chest: Effort normal. No respiratory distress.   Musculoskeletal: Normal range of motion.   Neurological: She is alert and oriented to person, place, and time.   Skin: Skin is warm and dry.     Patient screened negative for depression based on a PHQ-9 score of 0 on 7/27/2020.        RECENT LABS:  Hematology WBC   Date Value Ref Range Status   09/22/2020 5.98 3.40 - 10.80 10*3/mm3 Final   05/09/2018 7.39 4.5 - 11.0 10*3/uL Final     RBC   Date Value Ref Range Status   09/22/2020 4.65 3.77 - 5.28 10*6/mm3 Final   05/09/2018 4.86 4.0 - 5.2 10*6/uL Final     Hemoglobin   Date Value Ref Range Status   09/22/2020 14.2 12.0 - 15.9 g/dL Final   05/09/2018 15.0 12.0 - 16.0 g/dL Final     Hematocrit   Date Value Ref Range Status   09/22/2020 44.2 34.0 - 46.6 % Final   05/09/2018 45.9 36.0 - 46.0 % Final     Platelets   Date Value Ref Range Status   09/22/2020 242 140 - 450 10*3/mm3 Final   05/09/2018 243 140 - 440 10*3/uL Final          EXAM, 06/03/2020:  1. Bilateral digital screening mammogram with CAD.  2. Bilateral digital screening breast tomosynthesis.     INDICATION:  Routine Screening  Family History: Routine screening. No family history breast cancer.  Right breast cancer in July 2019     TECHNIQUE:  Bilateral digital screening mammogram images were obtained including  digital breast tomosynthesis and CAD review.     COMPARISON:  Screening mammogram, 10/18/2019, 07/08/2019, 06/03/2019 10/17/2018     FINDINGS:  There are scattered areas of fibroglandular density.   Since the prior mammogram, the patient has had a right lumpectomy for  cancer. A stellate mass in the posterior medial right breast has been  removed. An anterior nodule or lymph node in the right breast is  smaller. It now measures about 7 mm in diameter compared with 11 mm in  diameter. There is a central biopsy clip in the right breast.     Intramammary lymph node left breast laterally with focal parenchymal  density is unchanged from  10/17/2018     IMPRESSION:  Status post lumpectomy since prior examination. No evidence  of new or residual malignancy.. Left breast is stable.     BI-RADS CATEGORY 2: Benign Findings.  Recommend routine screening mammogram    Assessment/Plan     1.  T1 a N0 M0 invasive ductal carcinoma of the right breast.  Patient is status post lumpectomy with sentinel lymph node surgery on July 2019.  She is ER positive, [place] positive HER-2/berenice negative.  -September 19, 2018 screening mammogram showed new irregular nodular density in the deep medial right breast which is suspicious and six-month follow-up suggested.  -November 7, 2018, patient had right diagnostic mammogram which showed small ill-defined opacity in the deep medial right breast but ultrasound did not show that and six-month follow-up suggested  -Sabra 3, 2019, small irregular opacity in the 3:00 axis in the upper medial right breast, 8 mm, suspicious  -Biopsy consistent with invasive ductal carcinoma, 2.8 mm, grade 1, Meghan score of 3, associated DCIS, micro papillary and cribriform, ER +%, [place] +41-50%, HER-2/berenice 1+ negative, Ki-67 1%  -July 2019, status post right lumpectomy with sentinel lymph nodes, 3 o'clock position, no invasive carcinoma identified on the lumpectomy, single focus of invasive carcinoma 2.8 mm seen at biopsy, DCIS present 7 mm, DCIS in 2 of the 27 blocks, no lymphovascular space invasion, margins clear except close anterior margin from the DCIS is 1.1 mm, 3 lymph nodes negative    · Had a lengthy discussion with patient that she does not require any chemotherapy given the small size, low-grade nature of the tumor with it being ER [place] positive HER-2/berenice negative.  · XRT from 8/5/2019 - 9/3/2019  · 08/17/19 - started Arimidex  · 09/30/19 - Patient reports having gained about 20 pounds since the diagnosis of her cancer.  As a result, she also has arthralgia.  · 7/27/2020 patient complained of worsening arthralgias, particularly in her hips  since starting Arimidex.  After discussion with Dr. Morales, we will have her hold Arimidex x1 month.  Then start Femara 2.5 mg daily.  I will see her back in 2 months for reevaluation.  · Patient returns 9/22/2020 having started Femara about 1 month ago, and so far she is tolerating it quite well.    2.  Osteopenia, reviewed bone density from December 2017 and she does have osteopenia.  We encouraged her to take calcium and vitamin D supplements.  · DEXA scan December 2019 normal bone density.    3.  Lifestyle modification: Discussed in length with patient to exercise 40 minutes/day 5 days a week with 20 minutes of aerobics.  Also encouraged healthy diet.    4.  Referral to survivorship clinic    5.  Osteoarthritis    6.  Hot flashes secondary to Arimidex.-Now on letrozole and still having hot flashes.    Plan   1. Continue letrozole 2.5 mg daily.  2. Return in 2 months for follow-up visit with Dr. Morales for reevaluation.  3. Continue calcium 600 mg twice daily and vitamin D 1000 IU daily.          MARILEE Knott          CC:  MD Evelin Cortez MD Crystal McMahan, MD Robert Schweitzer, MD Janet Leon, MD

## 2020-10-06 ENCOUNTER — OFFICE VISIT (OUTPATIENT)
Dept: RADIATION ONCOLOGY | Facility: CLINIC | Age: 60
End: 2020-10-06

## 2020-10-06 VITALS
TEMPERATURE: 98.6 F | DIASTOLIC BLOOD PRESSURE: 78 MMHG | OXYGEN SATURATION: 98 % | HEART RATE: 83 BPM | BODY MASS INDEX: 39.86 KG/M2 | SYSTOLIC BLOOD PRESSURE: 133 MMHG | WEIGHT: 218 LBS

## 2020-10-06 DIAGNOSIS — C50.311 MALIGNANT NEOPLASM OF LOWER-INNER QUADRANT OF RIGHT BREAST OF FEMALE, ESTROGEN RECEPTOR POSITIVE (HCC): Primary | ICD-10-CM

## 2020-10-06 DIAGNOSIS — Z17.0 MALIGNANT NEOPLASM OF LOWER-INNER QUADRANT OF RIGHT BREAST OF FEMALE, ESTROGEN RECEPTOR POSITIVE (HCC): Primary | ICD-10-CM

## 2020-10-06 PROCEDURE — 99213 OFFICE O/P EST LOW 20 MIN: CPT | Performed by: RADIOLOGY

## 2020-10-06 NOTE — PROGRESS NOTES
Subjective     No ref. provider found    Cancer Staging  No matching staging information was found for the patient.   No chief complaint on file.   1 year follow up     S:      I had the pleasure of seeing Emily Azevedo  [facility].  She returns today for follow up now 1 year out from completion of her radiation therapy.  She has been doing well since I last saw her.  She completed radiation to the right breast on 9/3/19 to a dose of 4256cGy followed by a tumor bed boost. She was on arimidex and experiencing some weight gain and arthralgias.  She had her yearly mammogram on 6/3/20 which showed post lumpectomy since prior examination. No evidence of new or residual malignancy. Left breast is stable birads 2.she has switched to letrozole due to muscle aches and weight gain.          Review of Systems   Constitutional: Positive for unexpected weight change.   Musculoskeletal: Positive for arthralgias.         Past Medical History:   Diagnosis Date   • Arthritis    • Carpal tunnel syndrome    • Disease of thyroid gland     Hypothyroidism   • Graves disease    • Hearing loss     earing aids    • History of urinary tract infection    • Hypertension    • Malignant neoplasm of lower-inner quadrant of right breast of female, estrogen receptor positive (CMS/HCC) 6/24/2019   • Palpitations     Dr. Cheng PMD Benign    • Seasonal allergies    • Vertigo          Past Surgical History:   Procedure Laterality Date   • BREAST BIOPSY Right     MALIGNANT   • BREAST BIOPSY Right 2013    BENIGN   • BREAST LUMPECTOMY Right 7/8/2019    Procedure: BREAST LUMPECTOMY WITH SENTINEL NODE BIOPSY AND NEEDLE LOCALIZATION;  Surgeon: Evelin Ayala MD;  Location: [facility] OR [facility] [place];  Service: General   • CARPAL TUNNEL RELEASE Right    • CERVICAL CONE BIOPSY     • CHOLECYSTECTOMY  1983   • COLONOSCOPY     • GALLBLADDER SURGERY     • HYSTERECTOMY  2016   • LAPAROSCOPIC TUBAL LIGATION     • OOPHORECTOMY     • OK TOTAL ABDOM  HYSTERECTOMY Bilateral 7/28/2016    Procedure: TOTAL ABDOMINAL HYSTERECTOMY W/ BSO ;  Surgeon: George Strange MD;  Location: [facility] OR;  Service: Obstetrics/Gynecology   • ROTATOR CUFF REPAIR Right    • TONSILLECTOMY  1963         Social History     Socioeconomic History   • Marital status:      Spouse name: Mendez   • Number of children: 2   • Years of education: High school   • Highest education level: Not on file   Occupational History     Employer: RETIRED   Tobacco Use   • Smoking status: Never Smoker   • Smokeless tobacco: Never Used   Substance and Sexual Activity   • Alcohol use: No     Frequency: Never     Comment: maybe once a year   • Drug use: No   • Sexual activity: Yes     Partners: Male     Birth control/protection: Surgical     Comment:    Social History Narrative    2 sons         Family History   Problem Relation Age of Onset   • Lymphoma Paternal Uncle 30        non hodgkins   • Lymphoma Nephew 16        non hodgkins   • Stroke Mother    • COPD Father    • Breast cancer Neg Hx    • Malig Hyperthermia Neg Hx           Objective    Physical Exam  Constitutional:       Appearance: Normal appearance.   Chest:          Comments: Right breast slightly smaller, no palpable masses in either breast or axilla  Neurological:      Mental Status: She is alert.           Current Outpatient Medications on File Prior to Visit   Medication Sig Dispense Refill   • celecoxib (CeleBREX) 200 MG capsule Take 200 mg by mouth Daily. PATIENT TO CONTACT DR. TRA NAILS REGARDING HOLDING PRIOR TO SURGERY     • cetirizine (ZyrTEC) 10 MG tablet Take 10 mg by mouth daily.     • cyclobenzaprine (FLEXERIL) 10 MG tablet Take 0.5 tablets by mouth 3 (Three) Times a Day As Needed for Muscle Spasms. 20 tablet 0   • diclofenac (VOLTAREN) 1 % gel gel Apply 4 g topically to the appropriate area as directed 4 (Four) Times a Day As Needed. HOLD PRIOR TO SURGERY     • DULoxetine (CYMBALTA) 60 MG capsule  Take 60 mg by mouth daily. LAST DOSE 7/17/16     • irbesartan-hydrochlorothiazide (AVALIDE) 300-12.5 MG tablet Take 1 tablet by mouth Daily.  0   • letrozole (FEMARA) 2.5 MG tablet Take 1 tablet by mouth Daily. 30 tablet 3   • levothyroxine (SYNTHROID, LEVOTHROID) 100 MCG tablet Take 100 mcg by mouth daily.     • Multiple Vitamins-Minerals (ALIVE ONCE DAILY WOMENS 50+ PO) Take  by mouth.     • SAXENDA 18 MG/3ML solution pen-injector        No current facility-administered medications on file prior to visit.        ALLERGIES:  No Known Allergies    LMP  (LMP Unknown)      Current Status 9/22/2020   ECOG score 0         Assessment/Plan     60 yo female with hx of stage I right breast cancer now 1 year out from radiation with no evidence of disease.  She is due for her yearly mammogram June 2021 and she is aware.  She will continue arimidex and have regular follow up with Dr. Morales. I have not scheduled a follow up with me but I asked her to call with any questions or concerns.       Thank you very much for allowing me to participate in the care of this very pleasant patient.    Sincerely,      Maddie Blake MD

## 2020-11-09 ENCOUNTER — OFFICE VISIT (OUTPATIENT)
Dept: ONCOLOGY | Facility: CLINIC | Age: 60
End: 2020-11-09

## 2020-11-09 ENCOUNTER — LAB (OUTPATIENT)
Dept: LAB | Facility: HOSPITAL | Age: 60
End: 2020-11-09

## 2020-11-09 VITALS
TEMPERATURE: 98 F | DIASTOLIC BLOOD PRESSURE: 73 MMHG | RESPIRATION RATE: 16 BRPM | SYSTOLIC BLOOD PRESSURE: 108 MMHG | HEIGHT: 62 IN | WEIGHT: 219.9 LBS | HEART RATE: 80 BPM | BODY MASS INDEX: 40.46 KG/M2 | OXYGEN SATURATION: 97 %

## 2020-11-09 DIAGNOSIS — C50.311 MALIGNANT NEOPLASM OF LOWER-INNER QUADRANT OF RIGHT BREAST OF FEMALE, ESTROGEN RECEPTOR POSITIVE (HCC): Primary | ICD-10-CM

## 2020-11-09 DIAGNOSIS — Z17.0 MALIGNANT NEOPLASM OF LOWER-INNER QUADRANT OF RIGHT BREAST OF FEMALE, ESTROGEN RECEPTOR POSITIVE (HCC): Primary | ICD-10-CM

## 2020-11-09 LAB
ALBUMIN SERPL-MCNC: 4.3 G/DL (ref 3.5–5.2)
ALBUMIN/GLOB SERPL: 1.3 G/DL (ref 1.1–2.4)
ALP SERPL-CCNC: 94 U/L (ref 38–116)
ALT SERPL W P-5'-P-CCNC: 29 U/L (ref 0–33)
ANION GAP SERPL CALCULATED.3IONS-SCNC: 12.2 MMOL/L (ref 5–15)
AST SERPL-CCNC: 41 U/L (ref 0–32)
BASOPHILS # BLD AUTO: 0.02 10*3/MM3 (ref 0–0.2)
BASOPHILS NFR BLD AUTO: 0.3 % (ref 0–1.5)
BILIRUB SERPL-MCNC: 0.5 MG/DL (ref 0.2–1.2)
BUN SERPL-MCNC: 19 MG/DL (ref 6–20)
BUN/CREAT SERPL: 23.2 (ref 7.3–30)
CALCIUM SPEC-SCNC: 10 MG/DL (ref 8.5–10.2)
CHLORIDE SERPL-SCNC: 99 MMOL/L (ref 98–107)
CO2 SERPL-SCNC: 26.8 MMOL/L (ref 22–29)
CREAT SERPL-MCNC: 0.82 MG/DL (ref 0.6–1.1)
DEPRECATED RDW RBC AUTO: 45.1 FL (ref 37–54)
EOSINOPHIL # BLD AUTO: 0.32 10*3/MM3 (ref 0–0.4)
EOSINOPHIL NFR BLD AUTO: 4.7 % (ref 0.3–6.2)
ERYTHROCYTE [DISTWIDTH] IN BLOOD BY AUTOMATED COUNT: 13.2 % (ref 12.3–15.4)
GFR SERPL CREATININE-BSD FRML MDRD: 71 ML/MIN/1.73
GLOBULIN UR ELPH-MCNC: 3.2 GM/DL (ref 1.8–3.5)
GLUCOSE SERPL-MCNC: 99 MG/DL (ref 74–124)
HCT VFR BLD AUTO: 42.8 % (ref 34–46.6)
HGB BLD-MCNC: 14 G/DL (ref 12–15.9)
IMM GRANULOCYTES # BLD AUTO: 0.01 10*3/MM3 (ref 0–0.05)
IMM GRANULOCYTES NFR BLD AUTO: 0.1 % (ref 0–0.5)
LYMPHOCYTES # BLD AUTO: 1.37 10*3/MM3 (ref 0.7–3.1)
LYMPHOCYTES NFR BLD AUTO: 20.3 % (ref 19.6–45.3)
MCH RBC QN AUTO: 30.4 PG (ref 26.6–33)
MCHC RBC AUTO-ENTMCNC: 32.7 G/DL (ref 31.5–35.7)
MCV RBC AUTO: 93 FL (ref 79–97)
MONOCYTES # BLD AUTO: 0.51 10*3/MM3 (ref 0.1–0.9)
MONOCYTES NFR BLD AUTO: 7.6 % (ref 5–12)
NEUTROPHILS NFR BLD AUTO: 4.51 10*3/MM3 (ref 1.7–7)
NEUTROPHILS NFR BLD AUTO: 67 % (ref 42.7–76)
NRBC BLD AUTO-RTO: 0 /100 WBC (ref 0–0.2)
PLATELET # BLD AUTO: 230 10*3/MM3 (ref 140–450)
PMV BLD AUTO: 11 FL (ref 6–12)
POTASSIUM SERPL-SCNC: 4.3 MMOL/L (ref 3.5–4.7)
PROT SERPL-MCNC: 7.5 G/DL (ref 6.3–8)
RBC # BLD AUTO: 4.6 10*6/MM3 (ref 3.77–5.28)
SODIUM SERPL-SCNC: 138 MMOL/L (ref 134–145)
WBC # BLD AUTO: 6.74 10*3/MM3 (ref 3.4–10.8)

## 2020-11-09 PROCEDURE — 80053 COMPREHEN METABOLIC PANEL: CPT

## 2020-11-09 PROCEDURE — 99213 OFFICE O/P EST LOW 20 MIN: CPT | Performed by: INTERNAL MEDICINE

## 2020-11-09 PROCEDURE — 36415 COLL VENOUS BLD VENIPUNCTURE: CPT

## 2020-11-09 PROCEDURE — 85025 COMPLETE CBC W/AUTO DIFF WBC: CPT

## 2020-11-09 RX ORDER — PEN NEEDLE, DIABETIC 32GX 5/32"
NEEDLE, DISPOSABLE MISCELLANEOUS SEE ADMIN INSTRUCTIONS
COMMUNITY
Start: 2020-09-20

## 2020-11-09 RX ORDER — LETROZOLE 2.5 MG/1
2.5 TABLET, FILM COATED ORAL DAILY
Qty: 90 TABLET | Refills: 3 | Status: SHIPPED | OUTPATIENT
Start: 2020-11-09 | End: 2021-11-09

## 2020-11-09 NOTE — PROGRESS NOTES
Subjective     REASON FOR FOLLOW UP:   T1 a N0 M0 invasive ductal carcinoma of the right breast.  Patient is status post lumpectomy with sentinel lymph node surgery on July 2019.  She is ER positive, SD positive HER-2/berenice negative.  · XRT from 8/5/2019 - 9/3/2019  · 08/17/19 - started Arimidex  · Switched to Femara August 27, 2020 which she is tolerating well                             HISTORY OF PRESENT ILLNESS:  The patient is a 60 y.o. year old female with the above-mentioned issue is here today for short interval follow-up.  She was previously taken Arimidex, and had to discontinue it due to worsening arthralgias.  Her symptoms improved off the Arimidex.  She waited about a month and then started on letrozole 2.5 mg daily.  She has been taking that for about 1 month, and so far is tolerating quite well.  She does have some mild hot flashes, but they are tolerable.  She denies any worsening in her arthralgias.  She does continue on Celebrex for her arthritis.    Interval history: Patient denies any complaints.  She is tolerating Femara very well.  Screening mammogram 1/30/2020 which is negative and I reviewed it with her patient.  Her last bone density was December 10, 2019 which showed evidence of normal bone mineral density.  She is not due till December 2021.    ONCOLOGIC HISTORY  Back in October 2019 she had a mammogram which showed an abnormality in the right breast, which showed a new irregular nodular opacity in the deep medial right breast near the chest wall measuring over 1 cm in size.  She had circumscribed benign appearing nodule in the medial right breast near the nipple which is also new.  Likely thought to be a cyst.  Diagnostic mammogram was suggested.  Patient then underwent diagnostic mammogram November 7, 2018 and was thought to represent fibroglandular tissue.  Ultrasound showed no abnormality in the medial right breast that was seen on screening mammogram.  However a six-month follow-up  suggested.    Repeat diagnostic mammogram Sabra 3, 2019 was abnormal and biopsy confirmed invasive ductal carcinoma, grade 1, ER [place] positive HER-2/berenice negative.  Subsequently patient underwent lumpectomy on July 8, 2019 with sentinel lymph node biopsy.    9/19/2018: Bilateral screening mammogram  IMPRESSION:  1. New irregular nodular opacity in the deep medial right breast.  Suspicious imaging features. Recall for additional diagnostic imaging is  recommended.  2. Circumscribed benign-appearing nodule in the medial right breast near  the nipple is also new. Ultrasound imaging of this nodule is recommended  to evaluate for a probable cyst or similar benign nodule.    November 7, 2018: Right diagnostic mammogram  MAMMOGRAM FINDINGS:  Small focal ill-defined opacity is again noted within the deep medial  right breast along the 3:00 axis. This is visible in retrospect on each  of the prior studies, and may therefore potentially represent focally  prominent normal fibroglandular tissue. Ultrasound imaging was then  Performed.    November 7, 2019: Ultrasound of right breast  IMPRESSION:  1. Probably benign examination.  2. Small ill-defined opacity within the deep medial right breast showing  no corresponding abnormality on directed ultrasound imaging today.  3. Follow-up unilateral mammogram in six months is indicated.    Sabra 3, 2019: Right diagnostic mammogram and right breast ultrasound  MAMMOGRAM FINDINGS:  Small focal irregular opacity is again noted along the 3:00 axis in the  deep portion of the right breast about 9 cm from the nipple measuring  about 8 mm. Linear opacities surround the lesion in a radial pattern.  The morphology is concerning, particularly on tomogram images today.     ULTRASOUND FINDINGS:  High-resolution grayscale ultrasound imaging directed to this portion of  the breast today does show a subtle focus of acoustic shadowing,  although mass is not clearly delineated.     IMPRESSION:  1.  Small  irregular lesion in the deep medial right breast with  suspicious mammographic features, difficult to identify by ultrasound.  2.  Stereotactic core needle biopsy of the lesion is recommended for  further assessment.    June 13, 2019: Stereotactic biopsy of the right breast medial 3 o'clock position lesion shows invasive carcinoma of no special type, ductal, 2.8 mm, grade 1, Meghan score of 3 with associated DCIS with cribriform and micropapillary, low nuclear grade,    ER: %  MI: 41-50%  HER-2/berenice: 1+ negative  Ki-67 1%    June 21, 2019: MRI breast bilateral  IMPRESSION:  Small marking clip and postbiopsy changes in the lower inner  quadrant of the right breast as described in more detail above. There is  no MRI evidence of residual neoplasm. There are no suspicious findings  in the left breast.    July 8, 2019: Status post right needle localized partial mastectomy with sentinel lymph node biopsy  Synoptic Checklist    INVASIVE CARCINOMA OF THE BREAST (Breast Invasive - All Specimens)  Resulting Labs  [phone]  CLINICAL  Radiologic Finding Mass or architectural distortion  [facility],  [address] [place] [address]  PATIENT: Emily Azevedo  MRN: [id number] CSN: [id number]  Page: 2 of 4 Printed: 7/10/2019 11:35 AM  Right  .  TUMOR  Tumor Site: Invasive Carcinoma Upper inner quadrant  Clock Position of Tumor Site 3 o'clock  Histologic Type Invasive carcinoma of no special type (ductal, not otherwise  specified)  Histologic Grade (Meghan Histologic Score) No residual invasive carcinoma  Tumor Size Greatest dimension of largest invasive focus in Millimeters (mm): 2.8  mm Millimeters (mm)  Tumor Focality Single focus of invasive carcinoma  Ductal Carcinoma In Situ (DCIS) Present  Negative for extensive intraductal component (EIC)  Size (Extent) of DCIS Estimated size of DCIS greatest dimension in Millimeters (mm) is at  least: 7 mm Millimeters (mm)  Number  of Blocks with DCIS 2  Number of Blocks Examined 27  Architectural Patterns Cribriform  Nuclear Grade Grade I (low)  Necrosis Not identified  Lobular Carcinoma In Situ (LCIS) No LCIS in specimen  Tumor Extent  Accessory Findings  Lymphovascular Invasion Not identified  Dermal Lymphovascular Invasion No skin present  Microcalcifications Not identified  Treatment Effect No known presurgical therapy  .  MARGINS  DCIS Margins Uninvolved by DCIS  Distance of DCIS to Anterior Margin in Millimeters (mm) 1.1 Millimeters (mm)  Distance of DCIS to Posterior Margin in Millimeters (mm) 32 Millimeters (mm)  Distance of DCIS to Superior Margin in Millimeters (mm) 5.5 Millimeters (mm)  Distance of DCIS to Inferior Margin in Millimeters (mm) 5.4 Millimeters (mm)  Distance of DCIS to Medial Margin in Millimeters (mm) 40 Millimeters (mm)  Distance of DCIS to Lateral Margin in Millimeters (mm) 21 Millimeters (mm)  Distance of DCIS from Closest Margin in Millimeters (mm) 1.1 mm Millimeters (mm)  Closest Margin Anterior  .  LYMPH NODES  Regional Lymph Nodes Uninvolved by tumor cells  Number of Lymph Nodes Examined 3  Number of [place] Nodes Examined 3  .  PATHOLOGIC STAGE CLASSIFICATION (pTNM, AJCC 8th Edition)  TNM Descriptors Not applicable  Primary Tumor (Invasive Carcinoma) (pT) pT1a  Regional Lymph Nodes (pN)  Modifier (sn): Only sentinel node(s) evaluated.  Category (pN) pN0  .e to discuss further options of treat  Patient is here to discuss further options of treatment.  She has a T1 a N0 ER [place] positive HER-2 negative tumor in the right breast.  She has no family history of breast cancer.    XRT from 8/5/2019 - 9/3/2019  08/17/19 - started Arimidex    Past Medical History:   Diagnosis Date   • Arthritis    • Carpal tunnel syndrome    • Disease of thyroid gland     Hypothyroidism   • Graves disease    • Hearing loss     earing aids    • History of urinary tract infection    • Hypertension    • Malignant neoplasm of lower-inner  quadrant of right breast of female, estrogen receptor positive (CMS/HCC) 6/24/2019   • Palpitations     Dr. Cheng PMD Benign    • Seasonal allergies    • Vertigo         Past Surgical History:   Procedure Laterality Date   • BREAST BIOPSY Right     MALIGNANT   • BREAST BIOPSY Right 2013    BENIGN   • BREAST LUMPECTOMY Right 7/8/2019    Procedure: BREAST LUMPECTOMY WITH SENTINEL NODE BIOPSY AND NEEDLE LOCALIZATION;  Surgeon: Evelin Ayala MD;  Location: [facility];  Service: General   • CARPAL TUNNEL RELEASE Right    • CERVICAL CONE BIOPSY     • CHOLECYSTECTOMY  1983   • COLONOSCOPY     • GALLBLADDER SURGERY     • HYSTERECTOMY  2016   • LAPAROSCOPIC TUBAL LIGATION     • OOPHORECTOMY     • NC TOTAL ABDOM HYSTERECTOMY Bilateral 7/28/2016    Procedure: TOTAL ABDOMINAL HYSTERECTOMY W/ BSO ;  Surgeon: George Strange MD;  Location: [facility] OR;  Service: Obstetrics/Gynecology   • ROTATOR CUFF REPAIR Right    • TONSILLECTOMY  1963        Current Outpatient Medications on File Prior to Visit   Medication Sig Dispense Refill   • BD Pen Needle Sri U/F 32G X 4 MM misc See Admin Instructions.     • calcium carb-cholecalciferol 600-800 MG-UNIT tablet 2 tab po daily     • celecoxib (CeleBREX) 200 MG capsule Take 200 mg by mouth Daily. PATIENT TO CONTACT DR. EVELIN AYALA REGARDING HOLDING PRIOR TO SURGERY     • cetirizine (ZyrTEC) 10 MG tablet Take 10 mg by mouth daily.     • cyclobenzaprine (FLEXERIL) 10 MG tablet Take 0.5 tablets by mouth 3 (Three) Times a Day As Needed for Muscle Spasms. 20 tablet 0   • DULoxetine (CYMBALTA) 60 MG capsule Take 60 mg by mouth daily. LAST DOSE 7/17/16     • irbesartan-hydrochlorothiazide (AVALIDE) 300-12.5 MG tablet Take 1 tablet by mouth Daily.  0   • levothyroxine (SYNTHROID, LEVOTHROID) 100 MCG tablet Take 100 mcg by mouth daily.     • SAXENDA 18 MG/3ML solution pen-injector      • [DISCONTINUED] letrozole (FEMARA) 2.5 MG tablet Take 1 tablet by mouth Daily. 30  tablet 3   • [DISCONTINUED] diclofenac (VOLTAREN) 1 % gel gel Apply 4 g topically to the appropriate area as directed 4 (Four) Times a Day As Needed. HOLD PRIOR TO SURGERY     • [DISCONTINUED] Multiple Vitamins-Minerals (ALIVE ONCE DAILY WOMENS 50+ PO) Take  by mouth.       No current facility-administered medications on file prior to visit.         ALLERGIES:  No Known Allergies     Social History     Socioeconomic History   • Marital status:      Spouse name: Mendez   • Number of children: 2   • Years of education: High school   • Highest education level: Not on file   Occupational History     Employer: RETIRED   Tobacco Use   • Smoking status: Never Smoker   • Smokeless tobacco: Never Used   Substance and Sexual Activity   • Alcohol use: No     Frequency: Never     Comment: maybe once a year   • Drug use: No   • Sexual activity: Yes     Partners: Male     Birth control/protection: Surgical     Comment:    Social History Narrative    2 sons        Family History   Problem Relation Age of Onset   • Lymphoma Paternal Uncle 30        non hodgkins   • Lymphoma Nephew 16        non hodgkins   • Stroke Mother    • COPD Father    • Breast cancer Neg Hx    • Malig Hyperthermia Neg Hx         Review of Systems   Constitutional: Negative for activity change, appetite change, chills, diaphoresis, fatigue, fever and unexpected weight change.   HENT: Negative for hearing loss, mouth sores, nosebleeds, sore throat and trouble swallowing.    Respiratory: Negative for cough, chest tightness, shortness of breath and wheezing.    Cardiovascular: Negative for chest pain, palpitations and leg swelling.   Gastrointestinal: Negative for abdominal distention, abdominal pain, constipation, diarrhea, nausea and vomiting.   Endocrine: Positive for heat intolerance ( Hot flashes 11/09/20-Unchanged).   Genitourinary: Negative for difficulty urinating, dysuria, frequency, hematuria and urgency.   Musculoskeletal: Positive for  "arthralgias ( See HPI 11/09/20-Improved). Negative for joint swelling.        No muscle weakness.   Skin: Negative for rash and wound.   Neurological: Negative for dizziness, seizures, syncope, speech difficulty, weakness, numbness and headaches.   Hematological: Negative for adenopathy. Does not bruise/bleed easily.   Psychiatric/Behavioral: Negative for behavioral problems, confusion, sleep disturbance and suicidal ideas.   All other systems reviewed and are negative.  I have reviewed and confirmed the accuracy of the ROS as documented by the MA/LPN/RN Reshma Morales MD        Objective     Vitals:    11/09/20 0951   BP: 108/73   Pulse: 80   Resp: 16   Temp: 98 °F (36.7 °C)   TempSrc: Skin   SpO2: 97%   Weight: 99.7 kg (219 lb 14.4 oz)   Height: 157.5 cm (62.01\")   PainSc: 0-No pain      Current Status 11/9/2020   ECOG score 0         CONSTITUTIONAL:  Vital signs reviewed.    No distress, looks comfortable.  EYES:  Conjunctiva and lids unremarkable.  Extraocular eye movements intact.  EARS,NOSE,MOUTH,THROAT:  Ears and nose appear unremarkable.  Lips, teeth, gums appear unremarkable.  RESPIRATORY:  Normal respiratory effort.  , no rales  or wheezing, clear   CARDIOVASCULAR:  Regular rate and rhythm, no murmur  BREAST: Right breast: No skin changes, no evidence of breast mass, no nipple discharge, no evidence of any right axillary adenopathy or right supraclavicular adenopathy  Left breast: No evidence of any skin changes, no evidence of any left breast mass and no evidence of left nipple discharge as well as no left axillary adenopathy or left supraclavicular adenopathy.  No significant lower extremity edema.  SKIN: No wounds.  No rashes.  MUSCULOSKELETAL/EXTREMITIES: No clubbing or cyanosis.  No apparent unilateral weakness.  NEURO: CN 2-12 appear intact. No focal neurological deficits noted.  PSYCHIATRIC:  Normal judgment and insight.  Normal mood and affect.  Patient screened negative for depression based on a " PHQ-9 score of 0 on 7/27/2020.  I have reexamined the patient and the results are consistent with the previously documented exam. Reshma Morales MD         RECENT LABS:  Hematology WBC   Date Value Ref Range Status   11/09/2020 6.74 3.40 - 10.80 10*3/mm3 Final   05/09/2018 7.39 4.5 - 11.0 10*3/uL Final     RBC   Date Value Ref Range Status   11/09/2020 4.60 3.77 - 5.28 10*6/mm3 Final   05/09/2018 4.86 4.0 - 5.2 10*6/uL Final     Hemoglobin   Date Value Ref Range Status   11/09/2020 14.0 12.0 - 15.9 g/dL Final   05/09/2018 15.0 12.0 - 16.0 g/dL Final     Hematocrit   Date Value Ref Range Status   11/09/2020 42.8 34.0 - 46.6 % Final   05/09/2018 45.9 36.0 - 46.0 % Final     Platelets   Date Value Ref Range Status   11/09/2020 230 140 - 450 10*3/mm3 Final   05/09/2018 243 140 - 440 10*3/uL Final          EXAM, 06/03/2020:  1. Bilateral digital screening mammogram with CAD.  2. Bilateral digital screening breast tomosynthesis.     INDICATION:  Routine Screening  Family History: Routine screening. No family history breast cancer.  Right breast cancer in July 2019     TECHNIQUE:  Bilateral digital screening mammogram images were obtained including  digital breast tomosynthesis and CAD review.     COMPARISON:  Screening mammogram, 10/18/2019, 07/08/2019, 06/03/2019 10/17/2018     FINDINGS:  There are scattered areas of fibroglandular density.   Since the prior mammogram, the patient has had a right lumpectomy for  cancer. A stellate mass in the posterior medial right breast has been  removed. An anterior nodule or lymph node in the right breast is  smaller. It now measures about 7 mm in diameter compared with 11 mm in  diameter. There is a central biopsy clip in the right breast.     Intramammary lymph node left breast laterally with focal parenchymal  density is unchanged from 10/17/2018     IMPRESSION:  Status post lumpectomy since prior examination. No evidence  of new or residual malignancy.. Left breast is stable.      BI-RADS CATEGORY 2: Benign Findings.  Recommend routine screening mammogram    Assessment/Plan     1.  T1 a N0 M0 invasive ductal carcinoma of the right breast.  Patient is status post lumpectomy with sentinel lymph node surgery on July 2019.  She is ER positive, [place] positive HER-2/berenice negative.  -September 19, 2018 screening mammogram showed new irregular nodular density in the deep medial right breast which is suspicious and six-month follow-up suggested.  -November 7, 2018, patient had right diagnostic mammogram which showed small ill-defined opacity in the deep medial right breast but ultrasound did not show that and six-month follow-up suggested  -[place]ra 3, 2019, small irregular opacity in the 3:00 axis in the upper medial right breast, 8 mm, suspicious  -Biopsy consistent with invasive ductal carcinoma, 2.8 mm, grade 1, Meghan score of 3, associated DCIS, micro papillary and cribriform, ER +%, HI +41-50%, HER-2/berenice 1+ negative, Ki-67 1%  -July 2019, status post right lumpectomy with sentinel lymph nodes, 3 o'clock position, no invasive carcinoma identified on the lumpectomy, single focus of invasive carcinoma 2.8 mm seen at biopsy, DCIS present 7 mm, DCIS in 2 of the 27 blocks, no lymphovascular space invasion, margins clear except close anterior margin from the DCIS is 1.1 mm, 3 lymph nodes negative    · Had a lengthy discussion with patient that she does not require any chemotherapy given the small size, low-grade nature of the tumor with it being ER [place] positive HER-2/berenice negative.  · XRT from 8/5/2019 - 9/3/2019  · 08/17/19 - started Arimidex  · 09/30/19 - Patient reports having gained about 20 pounds since the diagnosis of her cancer.  As a result, she also has arthralgia.  · 7/27/2020 patient complained of worsening arthralgias, particularly in her hips since starting Arimidex.  After discussion with Dr. Morales, we will have her hold Arimidex x1 month.  Then start Femara 2.5 mg daily.  I will  see her back in 2 months for reevaluation.  · Patient returns 9/22/2020 having started Femara about 1 month ago, and so far she is tolerating it quite well.    2.  Osteopenia, reviewed bone density from December 2017 and she does have osteopenia.  We encouraged her to take calcium and vitamin D supplements.  · DEXA scan December 2019 normal bone density.  Stable patient taking calcium and vitamin D    3.  Lifestyle modification: Discussed in length with patient to exercise 40 minutes/day 5 days a week with 20 minutes of aerobics.  Also encouraged healthy diet.  · Encouraged exercise    4.  Referral to survivorship clinic    5.  Osteoarthritis    6.  Hot flashes secondary to Arimidex.-Now on letrozole and still having hot flashes.  · Hot flashes on letrozole resolved and currently tolerating well    Plan   1. Continue letrozole 2.5 mg daily.  2. Continue calcium and vitamin D supplements  3. Reviewed bone density from December 10, 2019 which is normal, due December 2021  4. Reviewed mammogram from [place] 3, 2020 and is negative, due June 4, 2021  5. Follow-up with me in 6 months with labs      Reshma Morales MD          CC:  MD Evelin Cortez MD Crystal McMahan, MD Robert Schweitzer, MD Janet Leon, MD

## 2020-12-03 RX ORDER — ANASTROZOLE 1 MG/1
TABLET ORAL
Qty: 90 TABLET | Refills: 2 | OUTPATIENT
Start: 2020-12-03

## 2020-12-10 ENCOUNTER — OFFICE VISIT (OUTPATIENT)
Dept: SURGERY | Facility: CLINIC | Age: 60
End: 2020-12-10

## 2020-12-10 VITALS
HEIGHT: 62 IN | RESPIRATION RATE: 17 BRPM | WEIGHT: 222 LBS | BODY MASS INDEX: 40.85 KG/M2 | OXYGEN SATURATION: 95 % | SYSTOLIC BLOOD PRESSURE: 114 MMHG | HEART RATE: 82 BPM | DIASTOLIC BLOOD PRESSURE: 78 MMHG

## 2020-12-10 DIAGNOSIS — Z17.0 MALIGNANT NEOPLASM OF LOWER-INNER QUADRANT OF RIGHT BREAST OF FEMALE, ESTROGEN RECEPTOR POSITIVE (HCC): Primary | ICD-10-CM

## 2020-12-10 DIAGNOSIS — Z12.31 ENCOUNTER FOR SCREENING MAMMOGRAM FOR MALIGNANT NEOPLASM OF BREAST: ICD-10-CM

## 2020-12-10 DIAGNOSIS — C50.311 MALIGNANT NEOPLASM OF LOWER-INNER QUADRANT OF RIGHT BREAST OF FEMALE, ESTROGEN RECEPTOR POSITIVE (HCC): Primary | ICD-10-CM

## 2020-12-10 PROCEDURE — 99213 OFFICE O/P EST LOW 20 MIN: CPT | Performed by: SURGERY

## 2020-12-10 NOTE — PROGRESS NOTES
[facility]     Referring Provider: George Strange MD     Chief complaint: Routine followup breast cancer     HPI:   6/24/19:  Ms. Emily Azevedo is a 58 yo woman, seen at the request of Dr. George Strange, for a new diagnosis of right breast cancer. This was initially detected as an imaging abnormality. Her work-up is detailed in the oncologic history below. She denies any  breast lumps, pain, skin changes, or nipple discharge. She has a past history of a benign right breast biopsy in 2006. She denies any family history of breast or ovarian cancer.      7/23/19:  She underwent right partial mastectomy & SLNB on 7/8/19. See surgery & pathology details below in oncologic history. She has been recovering well and has no complaints.      10/25/19:  She returns today for scheduled follow-up. I have reviewed the interval records since her last visit. She completed radiation on 9/3/19 and tolerated this well. She started Arimidex on 8/17/19 and has been having problems with joint pains and weight gain. She last saw PT on 9/30/19 and her L-Dex bioimpedance score was WNL. She underwent left screening MMG prior to her appointment which was benign (see imaging report details below). She still has occasional right breast pain, but otherwise denies any new breast-related complaints.     6/10/20:  She returns today for scheduled follow-up. She underwent screening MMG prior to her appointment which was benign (see imaging report details below). She continues on Arimidex and is still having issues with hot flashes and difficulty losing weight. She last saw PT in 2/2020 and her bioimpedance score was within normal limits. She denies any new complaints.    12/10/20, Interval History:  She returns today for scheduled follow-up. After her last visit, she held her Arimidex due to worsening arthralgias. Her arthralgias significantly improved and she is now on letrozole which she is tolerating well. She has not seen  PT since 2/2020, but does not feel like she is having any issues that require therapy. She denies any breast related complaints.      Oncology/Hematology History Overview Note   9/19/17, Screening MMG with Daniel ( [facility]):  BI-RADS 2: Benign.     10/17/18, Screening MMG with Daniel ( [place]):  There are scattered areas of fibroglandular density. Previous percutaneous biopsy clip marker in the upper central right breast. There is a new, irregular nodular opacity in the deep medial right breast along the 3:00 axis near the chest wall measuring just over 1 cm in size, newly visible since prior studies. There is a new circumscribed nodule in the medial right breast about 4 cm from the nipple that is enlarged since the prior exam. This may represent a cyst.                                 BI-RADS 0: Incomplete.    11/07/18, Right Diagnostic MMG & Right Breast US ( [facility]):  MMG:  Small focal ill-defined opacity is again noted within the deep medial right breast along the 3:00 axis. This is visible in retrospect on each of the prior studies, and may therefore potentially represent focally prominent normal fibroglandular tissue.   US:  Despite prolonged imaging, no focal lesion could be identified within this portion of the medial right breast. Two benign cysts in the medial right breast near the nipple measuring 0.9 cm and 0.6 cm, corresponding to benign-appearing nodular opacities visible on mammogram today.        BI-RADS 3: Probably benign. Follow-up MMG in six months.     Malignant neoplasm of lower-inner quadrant of right breast of female, estrogen receptor positive (CMS/HCC)   6/2/2019 Initial Diagnosis    Malignant neoplasm of lower-inner quadrant of right breast of female, estrogen receptor positive (CMS/HCC)     6/3/2019 Imaging    Right Diagnostic MMG & Right Breast US ( [facility]):    MMG:  Small focal irregular opacity is again noted along the 3:00 axis in the deep portion of the right breast about 9 cm from the  nipple measuring about 8 mm. Linear opacities surround the lesion in a radial pattern. The morphology is concerning, particularly on tomogram images today.   US:  High-resolution grayscale ultrasound imaging directed to this portion of the breast today does show a subtle focus of acoustic shadowing, although mass is not clearly delineated.   BI-RADS 4: Suspicious.     6/13/2019 Biopsy    Right breast, Stereotactic biopsy ([facility]):    1. Breast, Right Deep Medial 3 O'clock Position, Core Biopsy: Breast parenchyma with               A.  Minimally represented invasive carcinoma of no special type (ductal) measuring 2.8 mm maximally                     on the slide, [place] histologic grade I (tubules = 1, nuclei = 1, mitoses = 1), with associated ductal                     carcinoma in situ (DCIS) with cribriform and micropapillary architecture and low nuclear grade.    ER+ (%, strong)  TX+ (41-50%, moderate)  HER2 negative (IHC 1+)   Ki-67 1%     6/20/2019 Imaging    Bilateral Breast MRI ( [facility]):  The mammogram showed a very tiny cluster of microcalcifications in the lower inner quadrant of the right breast that were biopsied under stereotactic guidance. The biopsy marking clip is identified in the lower inner quadrant at approximately the 4:00 location. It is contained within the inferior aspect of a tubular shaped biopsy tract containing a small amount of subacute hemorrhage. This measures 9 mm in width and 4.7 cm in length. There is only a thin rind of low-level enhancement surrounding the biopsy clip and the tract that is consistent with enhancement due to postoperative change. No suspicious above threshold enhancement is identified. There is no MRI evidence of significant residual neoplasm. The patient certainly appears to be a candidate for breast conservation. No discrete mass is identified. Incidental note is made of an 8 mm diameter cyst in the anterior aspect of the right breast at the 3:00  location. There are a few scattered benign-appearing stippled areas of enhancement within the left breast. A small briskly enhancing lymph node is also noted at the 3:00 location within the middle third of the left breast. There is no axillary or internal mammary lymphadenopathy. The chest wall is unremarkable.   BI-RADS 6: Known malignancy.     7/8/2019 Surgery    Right needle-localized partial mastectomy and sentinel lymph node biopsy        1.  Right Breast Needle Localization (44 grams):                A.  RESIDUAL FOCUS OF DUCTAL CARCINOMA IN SITU, 7 MM, CRIBRIFORM TYPE (LOW NUCLEAR                    GRADE) ADJACENT TO THE PREVIOUS BIOPSY SITE.                B.  Negative for residual invasive neoplasm.                C.  All surgical margins of excision are negative for DCIS (closest margin anterior, 1.1 mm).                D.  For receptor studies, see previous pathology report (ER 95%, NH 45%, HER2-Gloria negative, Ki-67 1%).      2.  Right [place] Lymph Node #1:                A.  Single benign lymph node.               B.  Multiple stepped sections are negative for tumor.                 3.  Right [place] Lymph Node #2:                A.  Single benign lymph node.               B.  Multiple stepped sections are negative for tumor.     4.  Right [place] Lymph Node #3:                A.  Single benign lymph node.               B.  Multiple stepped sections are negative for tumor.     5.  Right Breast, Additional Lateral Margin:                A.  Benign breast parenchyma.                B.  Negative for neoplasm.       8/5/2019 - 9/3/2019 Radiation    Radiation OncologyTreatment Course:  Emily Azevedo received 5256 cGy in 21 fractions to right breast.     8/17/2019 - 7/27/2020 Hormonal Therapy    Anastrozole (Arimidex) 1 mg by mouth daily     10/18/2019 Imaging    Left Screening MMG with Daniel ([facility]):  Scattered areas of fibroglandular density. Focal density in the left upper-outer quadrant is stable from  09/19/2017. There are no masses or suspicious calcifications.  BI-RADS 2: Benign.     6/3/2020 Imaging    Screening MMG with Daniel ([facility]):  Scattered areas of fibroglandular density.  Since the prior mammogram, the patient has had a right lumpectomy for cancer. A stellate mass in the posterior medial right breast has been removed. An anterior nodule or lymph node in the right breast is smaller. It now measures about 7 mm in diameter compared with 11 mm in diameter. There is a central biopsy clip in the right breast.  Intramammary lymph node left breast laterally with focal parenchymal density is unchanged from 10/17/2018.   BI-RADS 2: Benign.     8/28/2020 -  Hormonal Therapy    Letrozole         Review of Systems:  See interval history.       Medications:    Current Outpatient Medications:   •  BD Pen Needle Sri U/F 32G X 4 MM misc, See Admin Instructions., Disp: , Rfl:   •  calcium carb-cholecalciferol 600-800 MG-UNIT tablet, 2 tab po daily, Disp: , Rfl:   •  celecoxib (CeleBREX) 200 MG capsule, Take 200 mg by mouth Daily. PATIENT TO CONTACT DR. TRA NAILS REGARDING HOLDING PRIOR TO SURGERY, Disp: , Rfl:   •  cetirizine (ZyrTEC) 10 MG tablet, Take 10 mg by mouth daily., Disp: , Rfl:   •  cyclobenzaprine (FLEXERIL) 10 MG tablet, Take 0.5 tablets by mouth 3 (Three) Times a Day As Needed for Muscle Spasms., Disp: 20 tablet, Rfl: 0  •  DULoxetine (CYMBALTA) 60 MG capsule, Take 60 mg by mouth daily. LAST DOSE 7/17/16, Disp: , Rfl:   •  irbesartan-hydrochlorothiazide (AVALIDE) 300-12.5 MG tablet, Take 1 tablet by mouth Daily., Disp: , Rfl: 0  •  letrozole (FEMARA) 2.5 MG tablet, Take 1 tablet by mouth Daily., Disp: 90 tablet, Rfl: 3  •  levothyroxine (SYNTHROID, LEVOTHROID) 100 MCG tablet, Take 100 mcg by mouth daily., Disp: , Rfl:   •  SAXENDA 18 MG/3ML solution pen-injector, , Disp: , Rfl:       Allergies:  No Known Allergies      Family History   Problem Relation Age of Onset   • Lymphoma Paternal Uncle  30        non hodgkins   • Lymphoma Nephew 16        non hodgkins   • Stroke Mother    • COPD Father    • Breast cancer Neg Hx    • Malig Hyperthermia Neg Hx        PHYSICAL EXAMINATION:   Vitals:    12/10/20 1543   BP: 114/78   Pulse: 82   Resp: 17   SpO2: 95%     ECOG 0 - Asymptomatic  General: NAD, well appearing  Psych: a&o x 3, normal mood and affect  Eyes: EOMI, no scleral icterus  ENMT: neck supple without masses or thyromegaly, mucus membranes moist  MSK: normal gait, normal ROM in bilateral shoulders  Lymph nodes: Well-healed right axillary scar; no cervical, supraclavicular or axillary lymphadenopathy  Breast: Large size, grade 3 ptosis  Right: On inspection, the breast is slightly smaller and uplifted vs the left with a well-healed LIQ radial scar. Scar is soft with minimal concavity. No masses or nipple abnormalities.  Left: No visible abnormalities on inspection while seated, with arms raised or hands on hips. No masses, skin changes, or nipple abnormalities.      Assessment:  60 y.o. F with a diagnosis of right breast cancer: low grade, invasive ductal carcinoma, ER/MA+, Her2 negative, with associated DCIS. She is sp right partial mastectomy & SLNB on 7/8/19, pT1aN0, anatomic stage IA, prognostic stage IA. She completed radiation on 9/3/19. She took Arimidex from 8/2019 to 7/2020. She is currently on letrozole.    Plan:  -Continue f/u with Dr. Morales.   -F/u in 6/2021 with MMG with NP.  -She was instructed to call sooner with any questions, concerns or changes on BSE.    Evelin Ayala MD      CC:  Gopal Cheng MD

## 2020-12-11 ENCOUNTER — TELEPHONE (OUTPATIENT)
Dept: SURGERY | Facility: CLINIC | Age: 60
End: 2020-12-11

## 2020-12-11 NOTE — TELEPHONE ENCOUNTER
MMG is scheduled on 6/9/2021 @ 4:00pm @ [facility], [place].    F/u appointment with Donya Romo NP is scheduled on 6/15/2021 @ 3:30pm.    Called and spoke to patient, patient expressed v/u of appointment times.    Sent patient a reminder letter in the mail.

## 2021-05-17 ENCOUNTER — LAB (OUTPATIENT)
Dept: LAB | Facility: HOSPITAL | Age: 61
End: 2021-05-17

## 2021-05-17 ENCOUNTER — OFFICE VISIT (OUTPATIENT)
Dept: ONCOLOGY | Facility: CLINIC | Age: 61
End: 2021-05-17

## 2021-05-17 VITALS
TEMPERATURE: 97.7 F | WEIGHT: 226.3 LBS | DIASTOLIC BLOOD PRESSURE: 75 MMHG | HEART RATE: 82 BPM | OXYGEN SATURATION: 94 % | BODY MASS INDEX: 41.64 KG/M2 | SYSTOLIC BLOOD PRESSURE: 118 MMHG | RESPIRATION RATE: 16 BRPM | HEIGHT: 62 IN

## 2021-05-17 DIAGNOSIS — M25.50 ARTHRALGIA, UNSPECIFIED JOINT: ICD-10-CM

## 2021-05-17 DIAGNOSIS — Z17.0 MALIGNANT NEOPLASM OF LOWER-INNER QUADRANT OF RIGHT BREAST OF FEMALE, ESTROGEN RECEPTOR POSITIVE (HCC): Primary | ICD-10-CM

## 2021-05-17 DIAGNOSIS — N95.0 POSTMENOPAUSAL BLEEDING: ICD-10-CM

## 2021-05-17 DIAGNOSIS — M19.90 OSTEOARTHRITIS, UNSPECIFIED OSTEOARTHRITIS TYPE, UNSPECIFIED SITE: ICD-10-CM

## 2021-05-17 DIAGNOSIS — C50.311 MALIGNANT NEOPLASM OF LOWER-INNER QUADRANT OF RIGHT BREAST OF FEMALE, ESTROGEN RECEPTOR POSITIVE (HCC): ICD-10-CM

## 2021-05-17 DIAGNOSIS — C50.311 MALIGNANT NEOPLASM OF LOWER-INNER QUADRANT OF RIGHT BREAST OF FEMALE, ESTROGEN RECEPTOR POSITIVE (HCC): Primary | ICD-10-CM

## 2021-05-17 DIAGNOSIS — Z17.0 MALIGNANT NEOPLASM OF LOWER-INNER QUADRANT OF RIGHT BREAST OF FEMALE, ESTROGEN RECEPTOR POSITIVE (HCC): ICD-10-CM

## 2021-05-17 LAB
ALBUMIN SERPL-MCNC: 4.2 G/DL (ref 3.5–5.2)
ALBUMIN/GLOB SERPL: 1.3 G/DL (ref 1.1–2.4)
ALP SERPL-CCNC: 96 U/L (ref 38–116)
ALT SERPL W P-5'-P-CCNC: 33 U/L (ref 0–33)
ANION GAP SERPL CALCULATED.3IONS-SCNC: 12.8 MMOL/L (ref 5–15)
AST SERPL-CCNC: 40 U/L (ref 0–32)
BASOPHILS # BLD AUTO: 0.03 10*3/MM3 (ref 0–0.2)
BASOPHILS NFR BLD AUTO: 0.5 % (ref 0–1.5)
BILIRUB SERPL-MCNC: 0.5 MG/DL (ref 0.2–1.2)
BUN SERPL-MCNC: 19 MG/DL (ref 6–20)
BUN/CREAT SERPL: 21.3 (ref 7.3–30)
CALCIUM SPEC-SCNC: 9.8 MG/DL (ref 8.5–10.2)
CHLORIDE SERPL-SCNC: 101 MMOL/L (ref 98–107)
CO2 SERPL-SCNC: 26.2 MMOL/L (ref 22–29)
CREAT SERPL-MCNC: 0.89 MG/DL (ref 0.6–1.1)
DEPRECATED RDW RBC AUTO: 44.5 FL (ref 37–54)
EOSINOPHIL # BLD AUTO: 0.31 10*3/MM3 (ref 0–0.4)
EOSINOPHIL NFR BLD AUTO: 4.7 % (ref 0.3–6.2)
ERYTHROCYTE [DISTWIDTH] IN BLOOD BY AUTOMATED COUNT: 13.7 % (ref 12.3–15.4)
GFR SERPL CREATININE-BSD FRML MDRD: 64 ML/MIN/1.73
GLOBULIN UR ELPH-MCNC: 3.2 GM/DL (ref 1.8–3.5)
GLUCOSE SERPL-MCNC: 95 MG/DL (ref 74–124)
HCT VFR BLD AUTO: 41.7 % (ref 34–46.6)
HGB BLD-MCNC: 14.7 G/DL (ref 12–15.9)
IMM GRANULOCYTES # BLD AUTO: 0.02 10*3/MM3 (ref 0–0.05)
IMM GRANULOCYTES NFR BLD AUTO: 0.3 % (ref 0–0.5)
LYMPHOCYTES # BLD AUTO: 1.63 10*3/MM3 (ref 0.7–3.1)
LYMPHOCYTES NFR BLD AUTO: 24.8 % (ref 19.6–45.3)
MCH RBC QN AUTO: 31.4 PG (ref 26.6–33)
MCHC RBC AUTO-ENTMCNC: 35.3 G/DL (ref 31.5–35.7)
MCV RBC AUTO: 89.1 FL (ref 79–97)
MONOCYTES # BLD AUTO: 0.54 10*3/MM3 (ref 0.1–0.9)
MONOCYTES NFR BLD AUTO: 8.2 % (ref 5–12)
NEUTROPHILS NFR BLD AUTO: 4.05 10*3/MM3 (ref 1.7–7)
NEUTROPHILS NFR BLD AUTO: 61.5 % (ref 42.7–76)
NRBC BLD AUTO-RTO: 0 /100 WBC (ref 0–0.2)
PLATELET # BLD AUTO: 223 10*3/MM3 (ref 140–450)
PMV BLD AUTO: 11.1 FL (ref 6–12)
POTASSIUM SERPL-SCNC: 3.9 MMOL/L (ref 3.5–4.7)
PROT SERPL-MCNC: 7.4 G/DL (ref 6.3–8)
RBC # BLD AUTO: 4.68 10*6/MM3 (ref 3.77–5.28)
SODIUM SERPL-SCNC: 140 MMOL/L (ref 134–145)
WBC # BLD AUTO: 6.58 10*3/MM3 (ref 3.4–10.8)

## 2021-05-17 PROCEDURE — 36415 COLL VENOUS BLD VENIPUNCTURE: CPT

## 2021-05-17 PROCEDURE — 85025 COMPLETE CBC W/AUTO DIFF WBC: CPT

## 2021-05-17 PROCEDURE — 99214 OFFICE O/P EST MOD 30 MIN: CPT | Performed by: INTERNAL MEDICINE

## 2021-05-17 PROCEDURE — 80053 COMPREHEN METABOLIC PANEL: CPT

## 2021-05-17 NOTE — PROGRESS NOTES
Subjective     REASON FOR FOLLOW UP:   T1 a N0 M0 invasive ductal carcinoma of the right breast.  Patient is status post lumpectomy with sentinel lymph node surgery on July 2019.  She is ER positive, [place] positive HER-2/berenice negative.  · XRT from 8/5/2019 - 9/3/2019  · 08/17/19 - started Arimidex  · Switched to Femara August 27, 2020 which she is tolerating well                             HISTORY OF PRESENT ILLNESS:  The patient is a 61 y.o. year old female with the above-mentioned issue is here today for short interval follow-up.  She was previously taken Arimidex, and had to discontinue it due to worsening arthralgias.  Her symptoms improved off the Arimidex.  She waited about a month and then started on letrozole 2.5 mg daily.  She has been taking that for about 1 month, and so far is tolerating quite well.  She does have some mild hot flashes, but they are tolerable.  She denies any worsening in her arthralgias.  She does continue on Celebrex for her arthritis.    Interval history: Patient is doing well without any complaints.  She is tolerating Femara.  Initially started with Arimidex and had to be switched to Femara which she is tolerating better.  She would be 2 years into starting aromatase inhibitor this August 2021.  She does not have much arthralgias and no hot flashes.        ONCOLOGIC HISTORY  Back in October 2019 she had a mammogram which showed an abnormality in the right breast, which showed a new irregular nodular opacity in the deep medial right breast near the chest wall measuring over 1 cm in size.  She had circumscribed benign appearing nodule in the medial right breast near the nipple which is also new.  Likely thought to be a cyst.  Diagnostic mammogram was suggested.  Patient then underwent diagnostic mammogram November 7, 2018 and was thought to represent fibroglandular tissue.  Ultrasound showed no abnormality in the medial right breast that was seen on screening mammogram.  However a  six-month follow-up suggested.    Repeat diagnostic mammogram Sabra 3, 2019 was abnormal and biopsy confirmed invasive ductal carcinoma, grade 1, ER [place] positive HER-2/berenice negative.  Subsequently patient underwent lumpectomy on July 8, 2019 with sentinel lymph node biopsy.    9/19/2018: Bilateral screening mammogram  IMPRESSION:  1. New irregular nodular opacity in the deep medial right breast.  Suspicious imaging features. Recall for additional diagnostic imaging is  recommended.  2. Circumscribed benign-appearing nodule in the medial right breast near  the nipple is also new. Ultrasound imaging of this nodule is recommended  to evaluate for a probable cyst or similar benign nodule.    November 7, 2018: Right diagnostic mammogram  MAMMOGRAM FINDINGS:  Small focal ill-defined opacity is again noted within the deep medial  right breast along the 3:00 axis. This is visible in retrospect on each  of the prior studies, and may therefore potentially represent focally  prominent normal fibroglandular tissue. Ultrasound imaging was then  Performed.    November 7, 2019: Ultrasound of right breast  IMPRESSION:  1. Probably benign examination.  2. Small ill-defined opacity within the deep medial right breast showing  no corresponding abnormality on directed ultrasound imaging today.  3. Follow-up unilateral mammogram in six months is indicated.    Sabra 3, 2019: Right diagnostic mammogram and right breast ultrasound  MAMMOGRAM FINDINGS:  Small focal irregular opacity is again noted along the 3:00 axis in the  deep portion of the right breast about 9 cm from the nipple measuring  about 8 mm. Linear opacities surround the lesion in a radial pattern.  The morphology is concerning, particularly on tomogram images today.     ULTRASOUND FINDINGS:  High-resolution grayscale ultrasound imaging directed to this portion of  the breast today does show a subtle focus of acoustic shadowing,  although mass is not clearly delineated.      IMPRESSION:  1.  Small irregular lesion in the deep medial right breast with  suspicious mammographic features, difficult to identify by ultrasound.  2.  Stereotactic core needle biopsy of the lesion is recommended for  further assessment.    June 13, 2019: Stereotactic biopsy of the right breast medial 3 o'clock position lesion shows invasive carcinoma of no special type, ductal, 2.8 mm, grade 1, Brookfield score of 3 with associated DCIS with cribriform and micropapillary, low nuclear grade,    ER: %  MO: 41-50%  HER-2/berenice: 1+ negative  Ki-67 1%    June 21, 2019: MRI breast bilateral  IMPRESSION:  Small marking clip and postbiopsy changes in the lower inner  quadrant of the right breast as described in more detail above. There is  no MRI evidence of residual neoplasm. There are no suspicious findings  in the left breast.    July 8, 2019: Status post right needle localized partial mastectomy with sentinel lymph node biopsy  Synoptic Checklist    INVASIVE CARCINOMA OF THE BREAST (Breast Invasive - All Specimens)  [facility]  [phone]  CLINICAL  Radiologic Finding Mass or architectural distortion  [facility],  [address] [place] [address]  PATIENT: Emily Azevedo  MRN: [id number] CSN: [id number]  Page: 2 of 4 Printed: 7/10/2019 11:35 AM  Right  .  TUMOR  Tumor Site: Invasive Carcinoma Upper inner quadrant  Clock Position of Tumor Site 3 o'clock  Histologic Type Invasive carcinoma of no special type (ductal, not otherwise  specified)  Histologic Grade (Meghan Histologic Score) No residual invasive carcinoma  Tumor Size Greatest dimension of largest invasive focus in Millimeters (mm): 2.8  mm Millimeters (mm)  Tumor Focality Single focus of invasive carcinoma  Ductal Carcinoma In Situ (DCIS) Present  Negative for extensive intraductal component (EIC)  Size (Extent) of DCIS Estimated size of DCIS greatest dimension in Millimeters (mm) is at  least: 7 mm  Millimeters (mm)  Number of Blocks with DCIS 2  Number of Blocks Examined 27  Architectural Patterns Cribriform  Nuclear Grade Grade I (low)  Necrosis Not identified  Lobular Carcinoma In Situ (LCIS) No LCIS in specimen  Tumor Extent  Accessory Findings  Lymphovascular Invasion Not identified  Dermal Lymphovascular Invasion No skin present  Microcalcifications Not identified  Treatment Effect No known presurgical therapy  .  MARGINS  DCIS Margins Uninvolved by DCIS  Distance of DCIS to Anterior Margin in Millimeters (mm) 1.1 Millimeters (mm)  Distance of DCIS to Posterior Margin in Millimeters (mm) 32 Millimeters (mm)  Distance of DCIS to Superior Margin in Millimeters (mm) 5.5 Millimeters (mm)  Distance of DCIS to Inferior Margin in Millimeters (mm) 5.4 Millimeters (mm)  Distance of DCIS to Medial Margin in Millimeters (mm) 40 Millimeters (mm)  Distance of DCIS to Lateral Margin in Millimeters (mm) 21 Millimeters (mm)  Distance of DCIS from Closest Margin in Millimeters (mm) 1.1 mm Millimeters (mm)  Closest Margin Anterior  .  LYMPH NODES  Regional Lymph Nodes Uninvolved by tumor cells  Number of Lymph Nodes Examined 3  Number of [place] Nodes Examined 3  .  PATHOLOGIC STAGE CLASSIFICATION (pTNM, AJCC 8th Edition)  TNM Descriptors Not applicable  Primary Tumor (Invasive Carcinoma) (pT) pT1a  Regional Lymph Nodes (pN)  Modifier (sn): Only sentinel node(s) evaluated.  Category (pN) pN0  .e to discuss further options of treat  Patient is here to discuss further options of treatment.  She has a T1 a N0 ER [place] positive HER-2 negative tumor in the right breast.  She has no family history of breast cancer.    XRT from 8/5/2019 - 9/3/2019  08/17/19 - started Arimidex    Past Medical History:   Diagnosis Date   • Arthritis    • Carpal tunnel syndrome    • Disease of thyroid gland     Hypothyroidism   • Graves disease    • Hearing loss     earing aids    • History of urinary tract infection    • Hypertension    • Malignant  neoplasm of lower-inner quadrant of right breast of female, estrogen receptor positive (CMS/HCC) 6/24/2019   • Palpitations     Dr. Cheng PMD Benign    • Seasonal allergies    • Vertigo         Past Surgical History:   Procedure Laterality Date   • BREAST BIOPSY Right     MALIGNANT   • BREAST BIOPSY Right 2013    BENIGN   • BREAST LUMPECTOMY Right 7/8/2019    Procedure: BREAST LUMPECTOMY WITH SENTINEL NODE BIOPSY AND NEEDLE LOCALIZATION;  Surgeon: Evelin Ayala MD;  Location: [facility], [place];  Service: General   • CARPAL TUNNEL RELEASE Right    • CERVICAL CONE BIOPSY     • CHOLECYSTECTOMY  1983   • COLONOSCOPY     • GALLBLADDER SURGERY     • HYSTERECTOMY  2016   • LAPAROSCOPIC TUBAL LIGATION     • OOPHORECTOMY     • AL TOTAL ABDOM HYSTERECTOMY Bilateral 7/28/2016    Procedure: TOTAL ABDOMINAL HYSTERECTOMY W/ BSO ;  Surgeon: George Strange MD;  Location: [facility] OR;  Service: Obstetrics/Gynecology   • ROTATOR CUFF REPAIR Right    • TONSILLECTOMY  1963        Current Outpatient Medications on File Prior to Visit   Medication Sig Dispense Refill   • BD Pen Needle Sri U/F 32G X 4 MM misc See Admin Instructions.     • calcium carb-cholecalciferol 600-800 MG-UNIT tablet 2 tab po daily     • celecoxib (CeleBREX) 200 MG capsule Take 200 mg by mouth Daily. PATIENT TO CONTACT DR. EVELIN AYALA REGARDING HOLDING PRIOR TO SURGERY     • cetirizine (ZyrTEC) 10 MG tablet Take 10 mg by mouth daily.     • cyclobenzaprine (FLEXERIL) 10 MG tablet Take 0.5 tablets by mouth 3 (Three) Times a Day As Needed for Muscle Spasms. 20 tablet 0   • DULoxetine (CYMBALTA) 60 MG capsule Take 60 mg by mouth daily. LAST DOSE 7/17/16     • irbesartan-hydrochlorothiazide (AVALIDE) 300-12.5 MG tablet Take 1 tablet by mouth Daily.  0   • letrozole (FEMARA) 2.5 MG tablet Take 1 tablet by mouth Daily. 90 tablet 3   • levothyroxine (SYNTHROID, LEVOTHROID) 100 MCG tablet Take 100 mcg by mouth daily.     • SAXENDA 18 MG/3ML solution  pen-injector        No current facility-administered medications on file prior to visit.        ALLERGIES:  No Known Allergies     Social History     Socioeconomic History   • Marital status:      Spouse name: Mendez   • Number of children: 2   • Years of education: High school   • Highest education level: Not on file   Tobacco Use   • Smoking status: Never Smoker   • Smokeless tobacco: Never Used   Substance and Sexual Activity   • Alcohol use: No     Comment: maybe once a year   • Drug use: No   • Sexual activity: Yes     Partners: Male     Birth control/protection: Surgical     Comment:         Family History   Problem Relation Age of Onset   • Lymphoma Paternal Uncle 30        non hodgkins   • Lymphoma Nephew 16        non hodgkins   • Stroke Mother    • COPD Father    • Breast cancer Neg Hx    • Malig Hyperthermia Neg Hx         Review of Systems   Constitutional: Positive for fatigue (05/-84-Gpgetnuld). Negative for activity change, appetite change, chills, diaphoresis, fever and unexpected weight change.   HENT: Negative for hearing loss, mouth sores, nosebleeds, sore throat and trouble swallowing.    Respiratory: Negative for cough, chest tightness, shortness of breath and wheezing.    Cardiovascular: Negative for chest pain, palpitations and leg swelling.   Gastrointestinal: Negative for abdominal distention, abdominal pain, constipation, diarrhea, nausea and vomiting.   Endocrine: Positive for heat intolerance ( Hot flashes 05/17/21-Improved).   Genitourinary: Negative for difficulty urinating, dysuria, frequency, hematuria and urgency.   Musculoskeletal: Positive for arthralgias ( See HPI  05/17/21-Unchanged). Negative for joint swelling.        No muscle weakness.   Skin: Negative for rash and wound.   Neurological: Negative for dizziness, seizures, syncope, speech difficulty, weakness, numbness and headaches.   Hematological: Negative for adenopathy. Does not bruise/bleed easily.  "  Psychiatric/Behavioral: Negative for behavioral problems, confusion, sleep disturbance and suicidal ideas.   All other systems reviewed and are negative.  Patient's arthralgias are much improved after starting Femara      I have reviewed and confirmed the accuracy of the ROS as documented by the MA/LPN/RN Reshma Morales MD        Objective     Vitals:    05/17/21 1111   BP: 118/75   Pulse: 82   Resp: 16   Temp: 97.7 °F (36.5 °C)   TempSrc: Temporal   SpO2: 94%   Weight: 103 kg (226 lb 4.8 oz)   Height: 157.5 cm (62.01\")   PainSc: 0-No pain      Current Status 5/17/2021   ECOG score 0         Physical examination    CONSTITUTIONAL:  Vital signs reviewed.  Alert and oriented x3  No distress, looks comfortable.  EYES:  Conjunctiva and lids unremarkable.  Extraocular eye movements intact.  HEENT: No evidence of lymphadenopathy, no thyromegaly  RESPIRATORY:  Normal respiratory effort.  , no rales  or wheezing, clear   BREAST: Right breast: No skin changes, no evidence of breast mass, no nipple discharge, no evidence of any right axillary adenopathy or right supraclavicular adenopathy  Left breast: No evidence of any skin changes, no evidence of any left breast mass and no evidence of left nipple discharge as well as no left axillary adenopathy or left supraclavicular adenopathy.  CARDIOVASCULAR:  Regular rate and rhythm, no murmur  No significant lower extremity edema.  Abdomen: Soft nontender positive bowel sounds no hepatosplenomegaly  SKIN: No wounds.  No rashes.  MUSCULOSKELETAL/EXTREMITIES: No clubbing or cyanosis.  No apparent unilateral weakness.  NEURO: CN 2-12 appear intact. No focal neurological deficits noted.  PSYCHIATRIC:  Normal judgment and insight.  Normal mood and affect.        RECENT LABS:  Hematology WBC   Date Value Ref Range Status   05/17/2021 6.58 3.40 - 10.80 10*3/mm3 Final   05/09/2018 7.39 4.5 - 11.0 10*3/uL Final     RBC   Date Value Ref Range Status   05/17/2021 4.68 3.77 - 5.28 10*6/mm3 " Final   05/09/2018 4.86 4.0 - 5.2 10*6/uL Final     Hemoglobin   Date Value Ref Range Status   05/17/2021 14.7 12.0 - 15.9 g/dL Final   05/09/2018 15.0 12.0 - 16.0 g/dL Final     Hematocrit   Date Value Ref Range Status   05/17/2021 41.7 34.0 - 46.6 % Final   05/09/2018 45.9 36.0 - 46.0 % Final     Platelets   Date Value Ref Range Status   05/17/2021 223 140 - 450 10*3/mm3 Final   05/09/2018 243 140 - 440 10*3/uL Final            Assessment/Plan     1.  T1 a N0 M0 invasive ductal carcinoma of the right breast.  Patient is status post lumpectomy with sentinel lymph node surgery on July 2019.  She is ER positive, MO positive HER-2/berenice negative.  -September 19, 2018 screening mammogram showed new irregular nodular density in the deep medial right breast which is suspicious and six-month follow-up suggested.  -November 7, 2018, patient had right diagnostic mammogram which showed small ill-defined opacity in the deep medial right breast but ultrasound did not show that and six-month follow-up suggested  -Sabra 3, 2019, small irregular opacity in the 3:00 axis in the upper medial right breast, 8 mm, suspicious  -Biopsy consistent with invasive ductal carcinoma, 2.8 mm, grade 1, Overton score of 3, associated DCIS, micro papillary and cribriform, ER +%, MO +41-50%, HER-2/berenice 1+ negative, Ki-67 1%  -July 2019, status post right lumpectomy with sentinel lymph nodes, 3 o'clock position, no invasive carcinoma identified on the lumpectomy, single focus of invasive carcinoma 2.8 mm seen at biopsy, DCIS present 7 mm, DCIS in 2 of the 27 blocks, no lymphovascular space invasion, margins clear except close anterior margin from the DCIS is 1.1 mm, 3 lymph nodes negative    · Had a lengthy discussion with patient that she does not require any chemotherapy given the small size, low-grade nature of the tumor with it being ER MO positive HER-2/berenice negative.  · XRT from 8/5/2019 - 9/3/2019  · 08/17/19 - started  Arimidex  · 09/30/19 - Patient reports having gained about 20 pounds since the diagnosis of her cancer.  As a result, she also has arthralgia.  · 7/27/2020 patient complained of worsening arthralgias, particularly in her hips since starting Arimidex.  After discussion with Dr. Morales, we will have her hold Arimidex x1 month.  Then start Femara 2.5 mg daily.  I will see her back in 2 months for reevaluation.  · Patient returns 9/22/2020 having started Femara about 1 month ago, and so far she is tolerating it quite well.  · May 17, 2021: Tolerating Femara very well with arthralgias which have not worsened.  She does not have much/.    2.  Osteopenia, reviewed bone density from December 2017 and she does have osteopenia.  We encouraged her to take calcium and vitamin D supplements.  · DEXA scan December 2019 normal bone density.  Stable patient taking calcium and vitamin D  · Next DEXA scan due December 2021    3.  Lifestyle modification: Discussed in length with patient to exercise 40 minutes/day 5 days a week with 20 minutes of aerobics.  Also encouraged healthy diet.  · Encouraged exercise  · Also proper nutrition    4.  Referral to survivorship clinic, done    5.  Osteoarthritis    6.  Hot flashes secondary to Arimidex.-Now on letrozole and still having hot flashes.  · Hot flashes on letrozole resolved and currently tolerating well    7.  Hypothyroidism stable    8.  Depression on Cymbalta well-controlled    Plan   1. Continue letrozole 2.5 mg daily.  2. Continue calcium and vitamin D supplements  3. Reviewed bone density from December 10, 2021   4. Reviewed mammogram from [place] 3, 2020 and is negative, due June 9, 2021,   5. Patient has follow-up with Donya Rose following mammogram  6. Follow-up with me in 6 months with labs      Reshma Morales MD          CC:  MD Evelin Cortez MD Crystal McMahan, MD Robert Schweitzer, MD Janet Leon, MD

## 2021-05-19 ENCOUNTER — TELEPHONE (OUTPATIENT)
Dept: ONCOLOGY | Facility: CLINIC | Age: 61
End: 2021-05-19

## 2021-05-19 NOTE — TELEPHONE ENCOUNTER
Caller:  ALANA     Relationship: SELF     Best call back number:[phone]    PATIENT WAS WANTING TO LET DR. BONNER KNOW THAT THE NAME OF THE  WAS SHANTELL MENDOZA. HER NUMBER -[phone] AND SHE WAS SEEN ON 10-  THANK YOU

## 2021-06-09 ENCOUNTER — HOSPITAL ENCOUNTER (OUTPATIENT)
Dept: MAMMOGRAPHY | Facility: HOSPITAL | Age: 61
Discharge: HOME | End: 2021-06-09
Admitting: SURGERY

## 2021-06-09 DIAGNOSIS — Z12.31 ENCOUNTER FOR SCREENING MAMMOGRAM FOR MALIGNANT NEOPLASM OF BREAST: ICD-10-CM

## 2021-06-09 PROCEDURE — 77063 BREAST TOMOSYNTHESIS BI: CPT

## 2021-06-09 PROCEDURE — 77067 SCR MAMMO BI INCL CAD: CPT

## 2021-06-15 ENCOUNTER — OFFICE VISIT (OUTPATIENT)
Dept: SURGERY | Facility: CLINIC | Age: 61
End: 2021-06-15

## 2021-06-15 VITALS
SYSTOLIC BLOOD PRESSURE: 117 MMHG | BODY MASS INDEX: 41.59 KG/M2 | DIASTOLIC BLOOD PRESSURE: 74 MMHG | OXYGEN SATURATION: 90 % | HEIGHT: 62 IN | HEART RATE: 79 BPM | WEIGHT: 226 LBS

## 2021-06-15 DIAGNOSIS — Z17.0 MALIGNANT NEOPLASM OF LOWER-INNER QUADRANT OF RIGHT BREAST OF FEMALE, ESTROGEN RECEPTOR POSITIVE (HCC): Primary | ICD-10-CM

## 2021-06-15 DIAGNOSIS — C50.311 MALIGNANT NEOPLASM OF LOWER-INNER QUADRANT OF RIGHT BREAST OF FEMALE, ESTROGEN RECEPTOR POSITIVE (HCC): Primary | ICD-10-CM

## 2021-06-15 PROCEDURE — 99213 OFFICE O/P EST LOW 20 MIN: CPT | Performed by: NURSE PRACTITIONER

## 2021-06-15 NOTE — PROGRESS NOTES
[facility]     Referring Provider: George Strange MD     Chief complaint: Routine followup breast cancer     HPI:   6/24/19:  Ms. Emily Azevedo is a 60 yo woman, seen at the request of Dr. George Strange, for a new diagnosis of right breast cancer. This was initially detected as an imaging abnormality. Her work-up is detailed in the oncologic history below. She denies any  breast lumps, pain, skin changes, or nipple discharge. She has a past history of a benign right breast biopsy in 2006. She denies any family history of breast or ovarian cancer.      7/23/19:  She underwent right partial mastectomy & SLNB on 7/8/19. See surgery & pathology details below in oncologic history. She has been recovering well and has no complaints.      10/25/19:  She returns today for scheduled follow-up. I have reviewed the interval records since her last visit. She completed radiation on 9/3/19 and tolerated this well. She started Arimidex on 8/17/19 and has been having problems with joint pains and weight gain. She last saw PT on 9/30/19 and her L-Dex bioimpedance score was WNL. She underwent left screening MMG prior to her appointment which was benign (see imaging report details below). She still has occasional right breast pain, but otherwise denies any new breast-related complaints.     6/10/20:  She returns today for scheduled follow-up. She underwent screening MMG prior to her appointment which was benign (see imaging report details below). She continues on Arimidex and is still having issues with hot flashes and difficulty losing weight. She last saw PT in 2/2020 and her bioimpedance score was within normal limits. She denies any new complaints.    12/10/20:  She returns today for scheduled follow-up. After her last visit, she held her Arimidex due to worsening arthralgias. Her arthralgias significantly improved and she is now on letrozole which she is tolerating well. She has not seen PT since 2/2020,  but does not feel like she is having any issues that require therapy. She denies any breast related complaints.    6/15/2021, Interval History:  She returns today for follow up with no breast changes, no LE.  She no longer follows up with PT.  She is tolerating letrozole well.  Recently she had bilateral retinal tears that were repaired.    Screening mammogram with tomosynthesis was completed on 6/9/2021 with stable findings, BiRAds 2.  (see full report below)    Oncology/Hematology History Overview Note   9/19/17, Screening MMG with Daniel ( [facility]):  BI-RADS 2: Benign.     10/17/18, Screening MMG with Daniel ( [place]):  There are scattered areas of fibroglandular density. Previous percutaneous biopsy clip marker in the upper central right breast. There is a new, irregular nodular opacity in the deep medial right breast along the 3:00 axis near the chest wall measuring just over 1 cm in size, newly visible since prior studies. There is a new circumscribed nodule in the medial right breast about 4 cm from the nipple that is enlarged since the prior exam. This may represent a cyst.                                 BI-RADS 0: Incomplete.    11/07/18, Right Diagnostic MMG & Right Breast US ([facility]):  MMG:  Small focal ill-defined opacity is again noted within the deep medial right breast along the 3:00 axis. This is visible in retrospect on each of the prior studies, and may therefore potentially represent focally prominent normal fibroglandular tissue.   US:  Despite prolonged imaging, no focal lesion could be identified within this portion of the medial right breast. Two benign cysts in the medial right breast near the nipple measuring 0.9 cm and 0.6 cm, corresponding to benign-appearing nodular opacities visible on mammogram today.        BI-RADS 3: Probably benign. Follow-up MMG in six months.     Malignant neoplasm of lower-inner quadrant of right breast of female, estrogen receptor positive (CMS/HCC)   6/2/2019  Initial Diagnosis    Malignant neoplasm of lower-inner quadrant of right breast of female, estrogen receptor positive (CMS/HCC)     6/3/2019 Imaging    Right Diagnostic MMG & Right Breast US ( [facility]):    MMG:  Small focal irregular opacity is again noted along the 3:00 axis in the deep portion of the right breast about 9 cm from the nipple measuring about 8 mm. Linear opacities surround the lesion in a radial pattern. The morphology is concerning, particularly on tomogram images today.   US:  High-resolution grayscale ultrasound imaging directed to this portion of the breast today does show a subtle focus of acoustic shadowing, although mass is not clearly delineated.   BI-RADS 4: Suspicious.     6/13/2019 Biopsy    Right breast, Stereotactic biopsy ([facility]):    1. Breast, Right Deep Medial 3 O'clock Position, Core Biopsy: Breast parenchyma with               A.  Minimally represented invasive carcinoma of no special type (ductal) measuring 2.8 mm maximally                     on the slide, [place] histologic grade I (tubules = 1, nuclei = 1, mitoses = 1), with associated ductal                     carcinoma in situ (DCIS) with cribriform and micropapillary architecture and low nuclear grade.    ER+ (%, strong)  NV+ (41-50%, moderate)  HER2 negative (IHC 1+)   Ki-67 1%     6/20/2019 Imaging    Bilateral Breast MRI ( [facility]):  The mammogram showed a very tiny cluster of microcalcifications in the lower inner quadrant of the right breast that were biopsied under stereotactic guidance. The biopsy marking clip is identified in the lower inner quadrant at approximately the 4:00 location. It is contained within the inferior aspect of a tubular shaped biopsy tract containing a small amount of subacute hemorrhage. This measures 9 mm in width and 4.7 cm in length. There is only a thin rind of low-level enhancement surrounding the biopsy clip and the tract that is consistent with enhancement due to postoperative  change. No suspicious above threshold enhancement is identified. There is no MRI evidence of significant residual neoplasm. The patient certainly appears to be a candidate for breast conservation. No discrete mass is identified. Incidental note is made of an 8 mm diameter cyst in the anterior aspect of the right breast at the 3:00 location. There are a few scattered benign-appearing stippled areas of enhancement within the left breast. A small briskly enhancing lymph node is also noted at the 3:00 location within the middle third of the left breast. There is no axillary or internal mammary lymphadenopathy. The chest wall is unremarkable.   BI-RADS 6: Known malignancy.     7/8/2019 Surgery    Right needle-localized partial mastectomy and sentinel lymph node biopsy        1.  Right Breast Needle Localization (44 grams):                A.  RESIDUAL FOCUS OF DUCTAL CARCINOMA IN SITU, 7 MM, CRIBRIFORM TYPE (LOW NUCLEAR                    GRADE) ADJACENT TO THE PREVIOUS BIOPSY SITE.                B.  Negative for residual invasive neoplasm.                C.  All surgical margins of excision are negative for DCIS (closest margin anterior, 1.1 mm).                D.  For receptor studies, see previous pathology report (ER 95%, [place] 45%, HER2-[place]loria negative, Ki-67 1%).      2.  Right [place] Lymph Node #1:                A.  Single benign lymph node.               B.  Multiple stepped sections are negative for tumor.                 3.  Right [place] Lymph Node #2:                A.  Single benign lymph node.               B.  Multiple stepped sections are negative for tumor.     4.  Right [place] Lymph Node #3:                A.  Single benign lymph node.               B.  Multiple stepped sections are negative for tumor.     5.  Right Breast, Additional Lateral Margin:                A.  Benign breast parenchyma.                B.  Negative for neoplasm.       8/5/2019 - 9/3/2019 Radiation    Radiation OncologyTreatment  Course:  Emily Azevedo received 5256 cGy in 21 fractions to right breast.     8/17/2019 - 7/27/2020 Hormonal Therapy    Anastrozole (Arimidex) 1 mg by mouth daily     10/18/2019 Imaging    Left Screening MMG with [facility] ([facility]):  Scattered areas of fibroglandular density. Focal density in the left upper-outer quadrant is stable from 09/19/2017. There are no masses or suspicious calcifications.  BI-RADS 2: Benign.     6/3/2020 Imaging    Screening MMG with Daniel ([facility]):  Scattered areas of fibroglandular density.  Since the prior mammogram, the patient has had a right lumpectomy for cancer. A stellate mass in the posterior medial right breast has been removed. An anterior nodule or lymph node in the right breast is smaller. It now measures about 7 mm in diameter compared with 11 mm in diameter. There is a central biopsy clip in the right breast.  Intramammary lymph node left breast laterally with focal parenchymal density is unchanged from 10/17/2018.   BI-RADS 2: Benign.     8/28/2020 -  Hormonal Therapy    Letrozole     6/9/2021 Imaging    Screening mammogram with tomosynthesis at [facility]  FINDINGS:  The breasts are almost entirely fatty. Lumpectomy changes in the posterior lower inner quadrant right breast are stable. Biopsy clip in the 11:00 posterior right breast stable. Postop changes right axilla,stable. No new dominant nodule or mass in either breast. Stable benign-appearing nodule in the lower inner anterior right breast. Benign  intramammary lymph node upper outer left breast. Faint benign-appearing nodularity in both breasts is stable. There is a superficial skin lesion in the posterior medial right breast on the CC projection only. No significant change when compared with prior images. No mammographic evidence of malignancy. Recommend repeat screening mammogram in one year.     IMPRESSION:  1. Benign screening mammogram.     BI-RADS CATEGORY 2: Benign Findings.            Review of Systems:  See  interval history.       Medications:    Current Outpatient Medications:   •  BD Pen Needle Sri U/F 32G X 4 MM misc, See Admin Instructions., Disp: , Rfl:   •  calcium carb-cholecalciferol 600-800 MG-UNIT tablet, 2 tab po daily, Disp: , Rfl:   •  celecoxib (CeleBREX) 200 MG capsule, Take 200 mg by mouth Daily. PATIENT TO CONTACT DR. TRA NAILS REGARDING HOLDING PRIOR TO SURGERY, Disp: , Rfl:   •  cetirizine (ZyrTEC) 10 MG tablet, Take 10 mg by mouth daily., Disp: , Rfl:   •  cyclobenzaprine (FLEXERIL) 10 MG tablet, Take 0.5 tablets by mouth 3 (Three) Times a Day As Needed for Muscle Spasms., Disp: 20 tablet, Rfl: 0  •  DULoxetine (CYMBALTA) 60 MG capsule, Take 60 mg by mouth daily. LAST DOSE 7/17/16, Disp: , Rfl:   •  letrozole (FEMARA) 2.5 MG tablet, Take 1 tablet by mouth Daily., Disp: 90 tablet, Rfl: 3  •  levothyroxine (SYNTHROID, LEVOTHROID) 100 MCG tablet, Take 100 mcg by mouth daily., Disp: , Rfl:   •  SAXENDA 18 MG/3ML solution pen-injector, , Disp: , Rfl:       Allergies:  No Known Allergies      Family History   Problem Relation Age of Onset   • Lymphoma Paternal Uncle 30        non hodgkins   • Lymphoma Nephew 16        non hodgkins   • Stroke Mother    • COPD Father    • Breast cancer Neg Hx    • Malig Hyperthermia Neg Hx        PHYSICAL EXAMINATION:   Vitals:    06/15/21 1413   BP: 117/74   Pulse: 79   SpO2: 90%        I reviewed physical exam, no changes noted    ECOG 0 - Asymptomatic  General: NAD, well appearing  Psych: a&o x 3, normal mood and affect  Eyes: EOMI, no scleral icterus  ENMT: neck supple without masses or thyromegaly, mucus membranes moist  MSK: normal gait, normal ROM in bilateral shoulders  Lymph nodes: Well-healed right axillary scar; no cervical, supraclavicular or axillary lymphadenopathy  Breast: Large size, grade 3 ptosis  Right: On inspection, the breast is slightly smaller and uplifted vs the left with a well-healed LIQ radial scar. Scar is soft with minimal concavity. No  masses or nipple abnormalities.  Left: No visible abnormalities on inspection while seated, with arms raised or hands on hips. No masses, skin changes, or nipple abnormalities.      Assessment:   1)   61 y.o. F with a diagnosis of right breast cancer: low grade, invasive ductal carcinoma, ER/[place]+, Her2 negative, with associated DCIS. She is sp right partial mastectomy & SLNB on 7/8/19, pT1aN0, anatomic stage IA, prognostic stage IA. She completed radiation on 9/3/19. She took Arimidex from 8/2019 to 7/2020. She is currently on letrozole.    Discussion:  She has no evidence of disease today and is now 2 years from her Stage Ia breast cancer diagnosis.  She is seeing Dr Cheng who will assume care, order her mammogram in 12 months.  She is in agreement with this plan.    Plan:  -Continue f/u with Dr. Morales.   -follow up in 12 months with Dr Cheng with screening mammogram with tomosynthesis    -She was instructed to call with any questions, concerns or changes on BSE, I would be happy to see her back in clinc.    Donya Romo, MARILEE      CC:  MD Dr. George George

## 2021-11-09 RX ORDER — LETROZOLE 2.5 MG/1
TABLET, FILM COATED ORAL
Qty: 90 TABLET | Refills: 3 | Status: SHIPPED | OUTPATIENT
Start: 2021-11-09 | End: 2022-10-14

## 2021-11-22 ENCOUNTER — OFFICE VISIT (OUTPATIENT)
Dept: ONCOLOGY | Facility: CLINIC | Age: 61
End: 2021-11-22

## 2021-11-22 ENCOUNTER — LAB (OUTPATIENT)
Dept: LAB | Facility: HOSPITAL | Age: 61
End: 2021-11-22

## 2021-11-22 VITALS
WEIGHT: 226.1 LBS | DIASTOLIC BLOOD PRESSURE: 77 MMHG | OXYGEN SATURATION: 94 % | SYSTOLIC BLOOD PRESSURE: 117 MMHG | RESPIRATION RATE: 18 BRPM | BODY MASS INDEX: 41.61 KG/M2 | HEIGHT: 62 IN | TEMPERATURE: 97.3 F | HEART RATE: 95 BPM

## 2021-11-22 DIAGNOSIS — C50.311 MALIGNANT NEOPLASM OF LOWER-INNER QUADRANT OF RIGHT BREAST OF FEMALE, ESTROGEN RECEPTOR POSITIVE (HCC): ICD-10-CM

## 2021-11-22 DIAGNOSIS — Z17.0 MALIGNANT NEOPLASM OF LOWER-INNER QUADRANT OF RIGHT BREAST OF FEMALE, ESTROGEN RECEPTOR POSITIVE (HCC): ICD-10-CM

## 2021-11-22 DIAGNOSIS — M81.0 OSTEOPOROSIS, UNSPECIFIED OSTEOPOROSIS TYPE, UNSPECIFIED PATHOLOGICAL FRACTURE PRESENCE: Primary | ICD-10-CM

## 2021-11-22 LAB
ALBUMIN SERPL-MCNC: 4.2 G/DL (ref 3.5–5.2)
ALBUMIN/GLOB SERPL: 1.4 G/DL (ref 1.1–2.4)
ALP SERPL-CCNC: 100 U/L (ref 38–116)
ALT SERPL W P-5'-P-CCNC: 33 U/L (ref 0–33)
ANION GAP SERPL CALCULATED.3IONS-SCNC: 10.3 MMOL/L (ref 5–15)
AST SERPL-CCNC: 39 U/L (ref 0–32)
BASOPHILS # BLD AUTO: 0.02 10*3/MM3 (ref 0–0.2)
BASOPHILS NFR BLD AUTO: 0.3 % (ref 0–1.5)
BILIRUB SERPL-MCNC: 0.4 MG/DL (ref 0.2–1.2)
BUN SERPL-MCNC: 23 MG/DL (ref 6–20)
BUN/CREAT SERPL: 25.3 (ref 7.3–30)
CALCIUM SPEC-SCNC: 10 MG/DL (ref 8.5–10.2)
CHLORIDE SERPL-SCNC: 102 MMOL/L (ref 98–107)
CO2 SERPL-SCNC: 27.7 MMOL/L (ref 22–29)
CREAT SERPL-MCNC: 0.91 MG/DL (ref 0.6–1.1)
DEPRECATED RDW RBC AUTO: 45.1 FL (ref 37–54)
EOSINOPHIL # BLD AUTO: 0.08 10*3/MM3 (ref 0–0.4)
EOSINOPHIL NFR BLD AUTO: 1 % (ref 0.3–6.2)
ERYTHROCYTE [DISTWIDTH] IN BLOOD BY AUTOMATED COUNT: 13.2 % (ref 12.3–15.4)
GFR SERPL CREATININE-BSD FRML MDRD: 62 ML/MIN/1.73
GLOBULIN UR ELPH-MCNC: 2.9 GM/DL (ref 1.8–3.5)
GLUCOSE SERPL-MCNC: 137 MG/DL (ref 74–124)
HCT VFR BLD AUTO: 42.7 % (ref 34–46.6)
HGB BLD-MCNC: 14 G/DL (ref 12–15.9)
IMM GRANULOCYTES # BLD AUTO: 0.03 10*3/MM3 (ref 0–0.05)
IMM GRANULOCYTES NFR BLD AUTO: 0.4 % (ref 0–0.5)
LYMPHOCYTES # BLD AUTO: 0.53 10*3/MM3 (ref 0.7–3.1)
LYMPHOCYTES NFR BLD AUTO: 6.8 % (ref 19.6–45.3)
MCH RBC QN AUTO: 30.5 PG (ref 26.6–33)
MCHC RBC AUTO-ENTMCNC: 32.8 G/DL (ref 31.5–35.7)
MCV RBC AUTO: 93 FL (ref 79–97)
MONOCYTES # BLD AUTO: 0.23 10*3/MM3 (ref 0.1–0.9)
MONOCYTES NFR BLD AUTO: 2.9 % (ref 5–12)
NEUTROPHILS NFR BLD AUTO: 6.92 10*3/MM3 (ref 1.7–7)
NEUTROPHILS NFR BLD AUTO: 88.6 % (ref 42.7–76)
NRBC BLD AUTO-RTO: 0 /100 WBC (ref 0–0.2)
PLATELET # BLD AUTO: 240 10*3/MM3 (ref 140–450)
PMV BLD AUTO: 11.4 FL (ref 6–12)
POTASSIUM SERPL-SCNC: 4.4 MMOL/L (ref 3.5–4.7)
PROT SERPL-MCNC: 7.1 G/DL (ref 6.3–8)
RBC # BLD AUTO: 4.59 10*6/MM3 (ref 3.77–5.28)
SODIUM SERPL-SCNC: 140 MMOL/L (ref 134–145)
WBC NRBC COR # BLD: 7.81 10*3/MM3 (ref 3.4–10.8)

## 2021-11-22 PROCEDURE — 36415 COLL VENOUS BLD VENIPUNCTURE: CPT

## 2021-11-22 PROCEDURE — 85025 COMPLETE CBC W/AUTO DIFF WBC: CPT

## 2021-11-22 PROCEDURE — 80053 COMPREHEN METABOLIC PANEL: CPT

## 2021-11-22 PROCEDURE — 99214 OFFICE O/P EST MOD 30 MIN: CPT | Performed by: INTERNAL MEDICINE

## 2021-11-22 NOTE — PROGRESS NOTES
Subjective     REASON FOR FOLLOW UP:   T1 a N0 M0 invasive ductal carcinoma of the right breast.  Patient is status post lumpectomy with sentinel lymph node surgery on July 2019.  She is ER positive, MD positive HER-2/berenice negative.  · XRT from 8/5/2019 - 9/3/2019  · 08/17/19 - started Arimidex  · Switched to Femara August 27, 2020 which she is tolerating well                             HISTORY OF PRESENT ILLNESS:  The patient is a 61 y.o. year old female with history of T1 a N0 M0 invasive ductal carcinoma the right breast s/p lumpectomy in July 2019 and currently on Arimidex since August 2019 but that was switched to Femara as she could not tolerate Arimidex.    Patient currently is tolerating Femara.  She has got mild joint pains but nothing significant.  She has no hot flashes.  She is due for bone density scan.  She is taking calcium and vitamin D supplements.        ONCOLOGIC HISTORY  Back in October 2019 she had a mammogram which showed an abnormality in the right breast, which showed a new irregular nodular opacity in the deep medial right breast near the chest wall measuring over 1 cm in size.  She had circumscribed benign appearing nodule in the medial right breast near the nipple which is also new.  Likely thought to be a cyst.  Diagnostic mammogram was suggested.  Patient then underwent diagnostic mammogram November 7, 2018 and was thought to represent fibroglandular tissue.  Ultrasound showed no abnormality in the medial right breast that was seen on screening mammogram.  However a six-month follow-up suggested.    Repeat diagnostic mammogram Sabra 3, 2019 was abnormal and biopsy confirmed invasive ductal carcinoma, grade 1, ER MD positive HER-2/berenice negative.  Subsequently patient underwent lumpectomy on July 8, 2019 with sentinel lymph node biopsy.    9/19/2018: Bilateral screening mammogram  IMPRESSION:  1. New irregular nodular opacity in the deep medial right breast.  Suspicious imaging features.  Recall for additional diagnostic imaging is  recommended.  2. Circumscribed benign-appearing nodule in the medial right breast near  the nipple is also new. Ultrasound imaging of this nodule is recommended  to evaluate for a probable cyst or similar benign nodule.    November 7, 2018: Right diagnostic mammogram  MAMMOGRAM FINDINGS:  Small focal ill-defined opacity is again noted within the deep medial  right breast along the 3:00 axis. This is visible in retrospect on each  of the prior studies, and may therefore potentially represent focally  prominent normal fibroglandular tissue. Ultrasound imaging was then  Performed.    November 7, 2019: Ultrasound of right breast  IMPRESSION:  1. Probably benign examination.  2. Small ill-defined opacity within the deep medial right breast showing  no corresponding abnormality on directed ultrasound imaging today.  3. Follow-up unilateral mammogram in six months is indicated.    Sabra 3, 2019: Right diagnostic mammogram and right breast ultrasound  MAMMOGRAM FINDINGS:  Small focal irregular opacity is again noted along the 3:00 axis in the  deep portion of the right breast about 9 cm from the nipple measuring  about 8 mm. Linear opacities surround the lesion in a radial pattern.  The morphology is concerning, particularly on tomogram images today.     ULTRASOUND FINDINGS:  High-resolution grayscale ultrasound imaging directed to this portion of  the breast today does show a subtle focus of acoustic shadowing,  although mass is not clearly delineated.     IMPRESSION:  1.  Small irregular lesion in the deep medial right breast with  suspicious mammographic features, difficult to identify by ultrasound.  2.  Stereotactic core needle biopsy of the lesion is recommended for  further assessment.    June 13, 2019: Stereotactic biopsy of the right breast medial 3 o'clock position lesion shows invasive carcinoma of no special type, ductal, 2.8 mm, grade 1, [place] score of 3 with  associated DCIS with cribriform and micropapillary, low nuclear grade,    ER: %  AK: 41-50%  HER-2/berenice: 1+ negative  Ki-67 1%    June 21, 2019: MRI breast bilateral  IMPRESSION:  Small marking clip and postbiopsy changes in the lower inner  quadrant of the right breast as described in more detail above. There is  no MRI evidence of residual neoplasm. There are no suspicious findings  in the left breast.    July 8, 2019: Status post right needle localized partial mastectomy with sentinel lymph node biopsy  Synoptic Checklist    INVASIVE CARCINOMA OF THE BREAST (Breast Invasive - All Specimens)  Resulting Labs  [phone]  CLINICAL  Radiologic Finding Mass or architectural distortion   [facility],  [address] [address]  PATIENT: Emily Azevedo  MRN: [id number] CSN: [id number]  Page: 2 of 4 Printed: 7/10/2019 11:35 AM  Right  .  TUMOR  Tumor Site: Invasive Carcinoma Upper inner quadrant  Clock Position of Tumor Site 3 o'clock  Histologic Type Invasive carcinoma of no special type (ductal, not otherwise  specified)  Histologic Grade ([place] Histologic Score) No residual invasive carcinoma  Tumor Size Greatest dimension of largest invasive focus in Millimeters (mm): 2.8  mm Millimeters (mm)  Tumor Focality Single focus of invasive carcinoma  Ductal Carcinoma In Situ (DCIS) Present  Negative for extensive intraductal component (EIC)  Size (Extent) of DCIS Estimated size of DCIS greatest dimension in Millimeters (mm) is at  least: 7 mm Millimeters (mm)  Number of Blocks with DCIS 2  Number of Blocks Examined 27  Architectural Patterns Cribriform  Nuclear Grade Grade I (low)  Necrosis Not identified  Lobular Carcinoma In Situ (LCIS) No LCIS in specimen  Tumor Extent  Accessory Findings  Lymphovascular Invasion Not identified  Dermal Lymphovascular Invasion No skin present  Microcalcifications Not identified  Treatment Effect No known presurgical  therapy  .  MARGINS  DCIS Margins Uninvolved by DCIS  Distance of DCIS to Anterior Margin in Millimeters (mm) 1.1 Millimeters (mm)  Distance of DCIS to Posterior Margin in Millimeters (mm) 32 Millimeters (mm)  Distance of DCIS to Superior Margin in Millimeters (mm) 5.5 Millimeters (mm)  Distance of DCIS to Inferior Margin in Millimeters (mm) 5.4 Millimeters (mm)  Distance of DCIS to Medial Margin in Millimeters (mm) 40 Millimeters (mm)  Distance of DCIS to Lateral Margin in Millimeters (mm) 21 Millimeters (mm)  Distance of DCIS from Closest Margin in Millimeters (mm) 1.1 mm Millimeters (mm)  Closest Margin Anterior  .  LYMPH NODES  Regional Lymph Nodes Uninvolved by tumor cells  Number of Lymph Nodes Examined 3  Number of [place] Nodes Examined 3  .  PATHOLOGIC STAGE CLASSIFICATION (pTNM, AJCC 8th Edition)  TNM Descriptors Not applicable  Primary Tumor (Invasive Carcinoma) (pT) pT1a  Regional Lymph Nodes (pN)  Modifier (sn): Only sentinel node(s) evaluated.  Category (pN) pN0  .e to discuss further options of treat  Patient is here to discuss further options of treatment.  She has a T1 a N0 ER AL positive HER-2 negative tumor in the right breast.  She has no family history of breast cancer.    XRT from 8/5/2019 - 9/3/2019  08/17/19 - started Arimidex    Past Medical History:   Diagnosis Date   • Arthritis    • Carpal tunnel syndrome    • Disease of thyroid gland     Hypothyroidism   • Graves disease    • Hearing loss     earing aids    • History of urinary tract infection    • Hypertension    • Malignant neoplasm of lower-inner quadrant of right breast of female, estrogen receptor positive (HCC) 6/24/2019   • Palpitations     Dr. Cheng PMD Benign    • Seasonal allergies    • Vertigo         Past Surgical History:   Procedure Laterality Date   • BREAST BIOPSY Right     MALIGNANT   • BREAST BIOPSY Right 2013    BENIGN   • BREAST LUMPECTOMY Right 7/8/2019    Procedure: BREAST LUMPECTOMY WITH SENTINEL NODE BIOPSY AND  NEEDLE LOCALIZATION;  Surgeon: Evelin Ayala MD;  Location: [facility] OR [facility] [place];  Service: General   • CARPAL TUNNEL RELEASE Right    • CERVICAL CONE BIOPSY     • CHOLECYSTECTOMY  1983   • COLONOSCOPY     • GALLBLADDER SURGERY     • HYSTERECTOMY  2016   • LAPAROSCOPIC TUBAL LIGATION     • OOPHORECTOMY     • DE TOTAL ABDOM HYSTERECTOMY Bilateral 7/28/2016    Procedure: TOTAL ABDOMINAL HYSTERECTOMY W/ BSO ;  Surgeon: George Strange MD;  Location: [facility];  Service: Obstetrics/Gynecology   • ROTATOR CUFF REPAIR Right    • TONSILLECTOMY  1963        Current Outpatient Medications on File Prior to Visit   Medication Sig Dispense Refill   • BD Pen Needle Sri U/F 32G X 4 MM misc See Admin Instructions.     • BIOTIN PO Take  by mouth.     • calcium carb-cholecalciferol 600-800 MG-UNIT tablet 2 tab po daily     • celecoxib (CeleBREX) 200 MG capsule Take 200 mg by mouth Daily. PATIENT TO CONTACT DR. EVELIN AYALA REGARDING HOLDING PRIOR TO SURGERY     • cetirizine (ZyrTEC) 10 MG tablet Take 10 mg by mouth daily.     • cyclobenzaprine (FLEXERIL) 10 MG tablet Take 0.5 tablets by mouth 3 (Three) Times a Day As Needed for Muscle Spasms. 20 tablet 0   • DULoxetine (CYMBALTA) 60 MG capsule Take 60 mg by mouth daily. LAST DOSE 7/17/16     • letrozole (FEMARA) 2.5 MG tablet TAKE 1 TABLET BY MOUTH EVERY DAY 90 tablet 3   • levothyroxine (SYNTHROID, LEVOTHROID) 100 MCG tablet Take 100 mcg by mouth daily.     • SAXENDA 18 MG/3ML solution pen-injector        No current facility-administered medications on file prior to visit.        ALLERGIES:  No Known Allergies     Social History     Socioeconomic History   • Marital status:      Spouse name: Mendez   • Number of children: 2   • Years of education: High school   Tobacco Use   • Smoking status: Never Smoker   • Smokeless tobacco: Never Used   Substance and Sexual Activity   • Alcohol use: No     Comment: maybe once a year   • Drug use: No   • Sexual  "activity: Yes     Partners: Male     Birth control/protection: Surgical     Comment:         Family History   Problem Relation Age of Onset   • Lymphoma Paternal Uncle 30        non hodgkins   • Lymphoma Nephew 16        non hodgkins   • Stroke Mother    • COPD Father    • Breast cancer Neg Hx    • Malig Hyperthermia Neg Hx         Review of Systems   Constitutional: Positive for fatigue. Negative for activity change, appetite change, chills, diaphoresis, fever and unexpected weight change.   HENT: Negative for hearing loss, mouth sores, nosebleeds, sore throat and trouble swallowing.    Respiratory: Negative for cough, chest tightness, shortness of breath and wheezing.    Cardiovascular: Negative for chest pain, palpitations and leg swelling.   Gastrointestinal: Negative for abdominal distention, abdominal pain, constipation, diarrhea, nausea and vomiting.   Endocrine: Positive for heat intolerance ( Hot flashes ).   Genitourinary: Negative for difficulty urinating, dysuria, frequency, hematuria and urgency.   Musculoskeletal: Positive for arthralgias ( See HPI  ). Negative for joint swelling.        No muscle weakness.   Skin: Negative for rash and wound.   Neurological: Negative for dizziness, seizures, syncope, speech difficulty, weakness, numbness and headaches.   Hematological: Negative for adenopathy. Does not bruise/bleed easily.   Psychiatric/Behavioral: Negative for behavioral problems, confusion, sleep disturbance and suicidal ideas.   All other systems reviewed and are negative.  Patient's arthralgias are much improved after starting Femara      I have reviewed and confirmed the accuracy of the ROS as documented by the MA/LPN/RN Reshma Morales MD        Objective     Vitals:    11/22/21 1350   BP: 117/77   Pulse: 95   Resp: 18   Temp: 97.3 °F (36.3 °C)   TempSrc: Temporal   SpO2: 94%   Weight: 103 kg (226 lb 1.6 oz)   Height: 157.5 cm (62.01\")   PainSc: 0-No pain      Current Status 11/22/2021 "   ECOG score 0         Physical examination      CONSTITUTIONAL:  Vital signs reviewed.  No distress, looks comfortable.  EYES:  Conjunctivae and lids unremarkable.  PERRLA  EARS,NOSE,MOUTH,THROAT:  Ears and nose appear unremarkable.  Lips, teeth, gums appear unremarkable.  RESPIRATORY:  Normal respiratory effort.  Lungs clear to auscultation bilaterally.  BREAST: Right breast: No skin changes, no evidence of breast mass, no nipple discharge, no evidence of any right axillary adenopathy or right supraclavicular adenopathy  Left breast: No evidence of any skin changes, no evidence of any left breast mass and no evidence of left nipple discharge as well as no left axillary adenopathy or left supraclavicular adenopathy.  CARDIOVASCULAR:  Normal S1, S2.  No murmurs rubs or gallops.  No significant lower extremity edema.  GASTROINTESTINAL: Abdomen appears unremarkable.  Nontender.  No hepatomegaly.  No splenomegaly.  LYMPHATIC:  No cervical, supraclavicular, axillary lymphadenopathy.  SKIN:  Warm.  No rashes.  PSYCHIATRIC:  Normal judgment and insight.  Normal mood and affect.      RECENT LABS:  Hematology WBC   Date Value Ref Range Status   11/22/2021 7.81 3.40 - 10.80 10*3/mm3 Final   05/09/2018 7.39 4.5 - 11.0 10*3/uL Final     RBC   Date Value Ref Range Status   11/22/2021 4.59 3.77 - 5.28 10*6/mm3 Final   05/09/2018 4.86 4.0 - 5.2 10*6/uL Final     Hemoglobin   Date Value Ref Range Status   11/22/2021 14.0 12.0 - 15.9 g/dL Final   05/09/2018 15.0 12.0 - 16.0 g/dL Final     Hematocrit   Date Value Ref Range Status   11/22/2021 42.7 34.0 - 46.6 % Final   05/09/2018 45.9 36.0 - 46.0 % Final     Platelets   Date Value Ref Range Status   11/22/2021 240 140 - 450 10*3/mm3 Final   05/09/2018 243 140 - 440 10*3/uL Final            Assessment/Plan     1.  T1 a N0 M0 invasive ductal carcinoma of the right breast.  Patient is status post lumpectomy with sentinel lymph node surgery on July 2019.  She is ER positive, MI positive  HER-2/berenice negative.  -September 19, 2018 screening mammogram showed new irregular nodular density in the deep medial right breast which is suspicious and six-month follow-up suggested.  -November 7, 2018, patient had right diagnostic mammogram which showed small ill-defined opacity in the deep medial right breast but ultrasound did not show that and six-month follow-up suggested  -Sabra 3, 2019, small irregular opacity in the 3:00 axis in the upper medial right breast, 8 mm, suspicious  -Biopsy consistent with invasive ductal carcinoma, 2.8 mm, grade 1, [place] score of 3, associated DCIS, micro papillary and cribriform, ER +%, CA +41-50%, HER-2/berenice 1+ negative, Ki-67 1%  -July 2019, status post right lumpectomy with sentinel lymph nodes, 3 o'clock position, no invasive carcinoma identified on the lumpectomy, single focus of invasive carcinoma 2.8 mm seen at biopsy, DCIS present 7 mm, DCIS in 2 of the 27 blocks, no lymphovascular space invasion, margins clear except close anterior margin from the DCIS is 1.1 mm, 3 lymph nodes negative    · Had a lengthy discussion with patient that she does not require any chemotherapy given the small size, low-grade nature of the tumor with it being ER CA positive HER-2/berenice negative.  · XRT from 8/5/2019 - 9/3/2019  · 08/17/19 - started Arimidex  · 09/30/19 - Patient reports having gained about 20 pounds since the diagnosis of her cancer.  As a result, she also has arthralgia.  · 7/27/2020 patient complained of worsening arthralgias, particularly in her hips since starting Arimidex.  After discussion with Dr. Morales, we will have her hold Arimidex x1 month.  Then start Femara 2.5 mg daily.  I will see her back in 2 months for reevaluation.  · November 22, 2021: Tolerating Femara very well        2.  Osteopenia, reviewed bone density from December 2017 and she does have osteopenia.  We encouraged her to take calcium and vitamin D supplements.  · DEXA scan December 2019  normal bone density.  Stable patient taking calcium and vitamin D  · Next DEXA scan due December 2021    3.  Lifestyle modification: Discussed in length with patient to exercise 40 minutes/day 5 days a week with 20 minutes of aerobics.  Also encouraged healthy diet.  · Encouraged exercise  · Also proper nutrition    4.  Referral to survivorship clinic, done    5.  Osteoarthritis    6.  Hot flashes secondary to Arimidex.-Now on letrozole and still having hot flashes.  · Hot flashes on letrozole resolved and currently tolerating well    7.  Hypothyroidism stable    8.  Depression on Cymbalta well-controlled    Plan   1. Continue letrozole 2.5 mg daily.  2. Continue calcium and vitamin D supplements  3. Last bone density done December 2019 and is due for bone density now  4. Reviewed mammogram from June 9, 2021 which is negative  5. Patient seen by Rose HUNT as of Sabra 15, 2021  6. Follow-up with me in 6 months with labs and bone density 1 week prior    Monitoring high risk medication    Reshma Morales MD          CC:  MD Evelin Cortez MD Crystal McMahan, MD Robert Schweitzer, MD Janet Leon, MD

## 2022-01-13 ENCOUNTER — HOSPITAL ENCOUNTER (OUTPATIENT)
Dept: GENERAL RADIOLOGY | Facility: HOSPITAL | Age: 62
Discharge: HOME | End: 2022-01-13
Admitting: FAMILY MEDICINE

## 2022-01-13 ENCOUNTER — TRANSCRIBE ORDERS (OUTPATIENT)
Dept: ADMINISTRATIVE | Facility: HOSPITAL | Age: 62
End: 2022-01-13

## 2022-01-13 DIAGNOSIS — M17.11 ARTHRITIS OF RIGHT KNEE: Primary | ICD-10-CM

## 2022-01-13 DIAGNOSIS — M25.561 RIGHT KNEE PAIN, UNSPECIFIED CHRONICITY: ICD-10-CM

## 2022-01-13 DIAGNOSIS — M17.11 ARTHRITIS OF RIGHT KNEE: ICD-10-CM

## 2022-01-13 PROCEDURE — 73560 X-RAY EXAM OF KNEE 1 OR 2: CPT

## 2022-04-01 ENCOUNTER — TELEPHONE (OUTPATIENT)
Dept: ONCOLOGY | Facility: CLINIC | Age: 62
End: 2022-04-01

## 2022-04-01 NOTE — TELEPHONE ENCOUNTER
Caller: ALANA    Relationship to patient: SELF    Best call back number: [phone]    Patient is needing: TO R/S LAB AND F/U APPT ON 5-9-2022 TO AROUND 9 AM THAT DAY.

## 2022-05-02 ENCOUNTER — APPOINTMENT (OUTPATIENT)
Dept: BONE DENSITY | Facility: HOSPITAL | Age: 62
End: 2022-05-02

## 2022-05-02 DIAGNOSIS — M81.0 OSTEOPOROSIS, UNSPECIFIED OSTEOPOROSIS TYPE, UNSPECIFIED PATHOLOGICAL FRACTURE PRESENCE: ICD-10-CM

## 2022-05-02 PROCEDURE — 77080 DXA BONE DENSITY AXIAL: CPT

## 2022-05-12 ENCOUNTER — TELEPHONE (OUTPATIENT)
Dept: ONCOLOGY | Facility: CLINIC | Age: 62
End: 2022-05-12

## 2022-05-12 ENCOUNTER — LAB (OUTPATIENT)
Dept: LAB | Facility: HOSPITAL | Age: 62
End: 2022-05-12

## 2022-05-12 ENCOUNTER — APPOINTMENT (OUTPATIENT)
Dept: LAB | Facility: HOSPITAL | Age: 62
End: 2022-05-12

## 2022-05-12 ENCOUNTER — OFFICE VISIT (OUTPATIENT)
Dept: ONCOLOGY | Facility: CLINIC | Age: 62
End: 2022-05-12

## 2022-05-12 VITALS
DIASTOLIC BLOOD PRESSURE: 75 MMHG | HEIGHT: 62 IN | SYSTOLIC BLOOD PRESSURE: 122 MMHG | RESPIRATION RATE: 18 BRPM | WEIGHT: 231.5 LBS | OXYGEN SATURATION: 95 % | TEMPERATURE: 97.1 F | BODY MASS INDEX: 42.6 KG/M2 | HEART RATE: 82 BPM

## 2022-05-12 DIAGNOSIS — Z17.0 MALIGNANT NEOPLASM OF LOWER-INNER QUADRANT OF RIGHT BREAST OF FEMALE, ESTROGEN RECEPTOR POSITIVE: Primary | ICD-10-CM

## 2022-05-12 DIAGNOSIS — E83.52 SERUM CALCIUM ELEVATED: ICD-10-CM

## 2022-05-12 DIAGNOSIS — C50.311 MALIGNANT NEOPLASM OF LOWER-INNER QUADRANT OF RIGHT BREAST OF FEMALE, ESTROGEN RECEPTOR POSITIVE: Primary | ICD-10-CM

## 2022-05-12 DIAGNOSIS — C50.311 MALIGNANT NEOPLASM OF LOWER-INNER QUADRANT OF RIGHT BREAST OF FEMALE, ESTROGEN RECEPTOR POSITIVE: ICD-10-CM

## 2022-05-12 DIAGNOSIS — Z17.0 MALIGNANT NEOPLASM OF LOWER-INNER QUADRANT OF RIGHT BREAST OF FEMALE, ESTROGEN RECEPTOR POSITIVE: ICD-10-CM

## 2022-05-12 LAB
ALBUMIN SERPL-MCNC: 4.2 G/DL (ref 3.5–5.2)
ALBUMIN/GLOB SERPL: 1.3 G/DL (ref 1.1–2.4)
ALP SERPL-CCNC: 109 U/L (ref 38–116)
ALT SERPL W P-5'-P-CCNC: 29 U/L (ref 0–33)
ANION GAP SERPL CALCULATED.3IONS-SCNC: 11.3 MMOL/L (ref 5–15)
AST SERPL-CCNC: 33 U/L (ref 0–32)
BASOPHILS # BLD AUTO: 0.04 10*3/MM3 (ref 0–0.2)
BASOPHILS NFR BLD AUTO: 0.5 % (ref 0–1.5)
BILIRUB SERPL-MCNC: 0.6 MG/DL (ref 0.2–1.2)
BUN SERPL-MCNC: 22 MG/DL (ref 6–20)
BUN/CREAT SERPL: 25.9 (ref 7.3–30)
CALCIUM SPEC-SCNC: 10.6 MG/DL (ref 8.5–10.2)
CHLORIDE SERPL-SCNC: 100 MMOL/L (ref 98–107)
CO2 SERPL-SCNC: 28.7 MMOL/L (ref 22–29)
CREAT SERPL-MCNC: 0.85 MG/DL (ref 0.6–1.1)
DEPRECATED RDW RBC AUTO: 46 FL (ref 37–54)
EGFRCR SERPLBLD CKD-EPI 2021: 76.6 ML/MIN/1.73
EOSINOPHIL # BLD AUTO: 0.24 10*3/MM3 (ref 0–0.4)
EOSINOPHIL NFR BLD AUTO: 3.2 % (ref 0.3–6.2)
ERYTHROCYTE [DISTWIDTH] IN BLOOD BY AUTOMATED COUNT: 13.5 % (ref 12.3–15.4)
GLOBULIN UR ELPH-MCNC: 3.2 GM/DL (ref 1.8–3.5)
GLUCOSE SERPL-MCNC: 96 MG/DL (ref 74–124)
HCT VFR BLD AUTO: 42.8 % (ref 34–46.6)
HGB BLD-MCNC: 13.9 G/DL (ref 12–15.9)
IMM GRANULOCYTES # BLD AUTO: 0.01 10*3/MM3 (ref 0–0.05)
IMM GRANULOCYTES NFR BLD AUTO: 0.1 % (ref 0–0.5)
LYMPHOCYTES # BLD AUTO: 1.72 10*3/MM3 (ref 0.7–3.1)
LYMPHOCYTES NFR BLD AUTO: 22.9 % (ref 19.6–45.3)
MCH RBC QN AUTO: 30.5 PG (ref 26.6–33)
MCHC RBC AUTO-ENTMCNC: 32.5 G/DL (ref 31.5–35.7)
MCV RBC AUTO: 93.9 FL (ref 79–97)
MONOCYTES # BLD AUTO: 0.66 10*3/MM3 (ref 0.1–0.9)
MONOCYTES NFR BLD AUTO: 8.8 % (ref 5–12)
NEUTROPHILS NFR BLD AUTO: 4.85 10*3/MM3 (ref 1.7–7)
NEUTROPHILS NFR BLD AUTO: 64.5 % (ref 42.7–76)
NRBC BLD AUTO-RTO: 0 /100 WBC (ref 0–0.2)
PLATELET # BLD AUTO: 265 10*3/MM3 (ref 140–450)
PMV BLD AUTO: 10.5 FL (ref 6–12)
POTASSIUM SERPL-SCNC: 4.3 MMOL/L (ref 3.5–4.7)
PROT SERPL-MCNC: 7.4 G/DL (ref 6.3–8)
RBC # BLD AUTO: 4.56 10*6/MM3 (ref 3.77–5.28)
SODIUM SERPL-SCNC: 140 MMOL/L (ref 134–145)
WBC NRBC COR # BLD: 7.52 10*3/MM3 (ref 3.4–10.8)

## 2022-05-12 PROCEDURE — 80053 COMPREHEN METABOLIC PANEL: CPT

## 2022-05-12 PROCEDURE — 36415 COLL VENOUS BLD VENIPUNCTURE: CPT

## 2022-05-12 PROCEDURE — 85025 COMPLETE CBC W/AUTO DIFF WBC: CPT

## 2022-05-12 PROCEDURE — 99214 OFFICE O/P EST MOD 30 MIN: CPT | Performed by: INTERNAL MEDICINE

## 2022-05-12 RX ORDER — LOSARTAN POTASSIUM AND HYDROCHLOROTHIAZIDE 12.5; 5 MG/1; MG/1
1 TABLET ORAL DAILY
COMMUNITY
End: 2022-10-28

## 2022-05-12 NOTE — TELEPHONE ENCOUNTER
Call to Ms. Azevedo to let her know her calcium level is elevated today, and Dr. Morales wanted to instruct her to hold her calcium supplements, and we will set up appointment to come back and recheck in a month with nurse review.  Patient verbalized understanding.    Message to scheduling to set up lab appointment with RN review.  YADIRA

## 2022-05-12 NOTE — PROGRESS NOTES
Subjective     REASON FOR FOLLOW UP:   T1 a N0 M0 invasive ductal carcinoma of the right breast.  Patient is status post lumpectomy with sentinel lymph node surgery on July 2019.  She is ER positive, VT positive HER-2/berenice negative.  · XRT from 8/5/2019 - 9/3/2019  · 08/17/19 - started Arimidex  · Switched to Femara August 27, 2020 which she is tolerating well                             HISTORY OF PRESENT ILLNESS:  The patient is a 62 y.o. year old female with history of T1 a N0 M0 invasive ductal carcinoma the right breast s/p lumpectomy in July 2019 and currently on Arimidex since August 2019 but that was switched to Femara as she could not tolerate Arimidex.    Patient has minimal joint pains.  Sometimes she has to take Celebrex but most of the time the Cymbalta is helping her with joint pains.  She has not lost weight and she has got a good appetite.  I have encouraged her to exercise as we will have a healthy diet      ONCOLOGIC HISTORY  Back in October 2019 she had a mammogram which showed an abnormality in the right breast, which showed a new irregular nodular opacity in the deep medial right breast near the chest wall measuring over 1 cm in size.  She had circumscribed benign appearing nodule in the medial right breast near the nipple which is also new.  Likely thought to be a cyst.  Diagnostic mammogram was suggested.  Patient then underwent diagnostic mammogram November 7, 2018 and was thought to represent fibroglandular tissue.  Ultrasound showed no abnormality in the medial right breast that was seen on screening mammogram.  However a six-month follow-up suggested.    Repeat diagnostic mammogram Sabra 3, 2019 was abnormal and biopsy confirmed invasive ductal carcinoma, grade 1, ER VT positive HER-2/berenice negative.  Subsequently patient underwent lumpectomy on July 8, 2019 with sentinel lymph node biopsy.    9/19/2018: Bilateral screening mammogram  IMPRESSION:  1. New irregular nodular opacity in the deep  medial right breast.  Suspicious imaging features. Recall for additional diagnostic imaging is  recommended.  2. Circumscribed benign-appearing nodule in the medial right breast near  the nipple is also new. Ultrasound imaging of this nodule is recommended  to evaluate for a probable cyst or similar benign nodule.    November 7, 2018: Right diagnostic mammogram  MAMMOGRAM FINDINGS:  Small focal ill-defined opacity is again noted within the deep medial  right breast along the 3:00 axis. This is visible in retrospect on each  of the prior studies, and may therefore potentially represent focally  prominent normal fibroglandular tissue. Ultrasound imaging was then  Performed.    November 7, 2019: Ultrasound of right breast  IMPRESSION:  1. Probably benign examination.  2. Small ill-defined opacity within the deep medial right breast showing  no corresponding abnormality on directed ultrasound imaging today.  3. Follow-up unilateral mammogram in six months is indicated.    Sabra 3, 2019: Right diagnostic mammogram and right breast ultrasound  MAMMOGRAM FINDINGS:  Small focal irregular opacity is again noted along the 3:00 axis in the  deep portion of the right breast about 9 cm from the nipple measuring  about 8 mm. Linear opacities surround the lesion in a radial pattern.  The morphology is concerning, particularly on tomogram images today.     ULTRASOUND FINDINGS:  High-resolution grayscale ultrasound imaging directed to this portion of  the breast today does show a subtle focus of acoustic shadowing,  although mass is not clearly delineated.     IMPRESSION:  1.  Small irregular lesion in the deep medial right breast with  suspicious mammographic features, difficult to identify by ultrasound.  2.  Stereotactic core needle biopsy of the lesion is recommended for  further assessment.    June 13, 2019: Stereotactic biopsy of the right breast medial 3 o'clock position lesion shows invasive carcinoma of no special type,  ductal, 2.8 mm, grade 1, [place] score of 3 with associated DCIS with cribriform and micropapillary, low nuclear grade,    ER: %  AL: 41-50%  HER-2/berenice: 1+ negative  Ki-67 1%    June 21, 2019: MRI breast bilateral  IMPRESSION:  Small marking clip and postbiopsy changes in the lower inner  quadrant of the right breast as described in more detail above. There is  no MRI evidence of residual neoplasm. There are no suspicious findings  in the left breast.    July 8, 2019: Status post right needle localized partial mastectomy with sentinel lymph node biopsy  Synoptic Checklist    INVASIVE CARCINOMA OF THE BREAST (Breast Invasive - All Specimens)  Resulting Labs  [phone]  CLINICAL  Radiologic Finding Mass or architectural distortion  [facility],  [address] [place] [address]  PATIENT: Emily Azevedo  MRN: [id number] CSN: [id number]  Page: 2 of 4 Printed: 7/10/2019 11:35 AM  Right  .  TUMOR  Tumor Site: Invasive Carcinoma Upper inner quadrant  Clock Position of Tumor Site 3 o'clock  Histologic Type Invasive carcinoma of no special type (ductal, not otherwise  specified)  Histologic Grade ([place] Histologic Score) No residual invasive carcinoma  Tumor Size Greatest dimension of largest invasive focus in Millimeters (mm): 2.8  mm Millimeters (mm)  Tumor Focality Single focus of invasive carcinoma  Ductal Carcinoma In Situ (DCIS) Present  Negative for extensive intraductal component (EIC)  Size (Extent) of DCIS Estimated size of DCIS greatest dimension in Millimeters (mm) is at  least: 7 mm Millimeters (mm)  Number of Blocks with DCIS 2  Number of Blocks Examined 27  Architectural Patterns Cribriform  Nuclear Grade Grade I (low)  Necrosis Not identified  Lobular Carcinoma In Situ (LCIS) No LCIS in specimen  Tumor Extent  Accessory Findings  Lymphovascular Invasion Not identified  Dermal Lymphovascular Invasion No skin present  Microcalcifications Not  identified  Treatment Effect No known presurgical therapy  .  MARGINS  DCIS Margins Uninvolved by DCIS  Distance of DCIS to Anterior Margin in Millimeters (mm) 1.1 Millimeters (mm)  Distance of DCIS to Posterior Margin in Millimeters (mm) 32 Millimeters (mm)  Distance of DCIS to Superior Margin in Millimeters (mm) 5.5 Millimeters (mm)  Distance of DCIS to Inferior Margin in Millimeters (mm) 5.4 Millimeters (mm)  Distance of DCIS to Medial Margin in Millimeters (mm) 40 Millimeters (mm)  Distance of DCIS to Lateral Margin in Millimeters (mm) 21 Millimeters (mm)  Distance of DCIS from Closest Margin in Millimeters (mm) 1.1 mm Millimeters (mm)  Closest Margin Anterior  .  LYMPH NODES  Regional Lymph Nodes Uninvolved by tumor cells  Number of Lymph Nodes Examined 3  Number of [place] Nodes Examined 3  .  PATHOLOGIC STAGE CLASSIFICATION (pTNM, AJCC 8th Edition)  TNM Descriptors Not applicable  Primary Tumor (Invasive Carcinoma) (pT) pT1a  Regional Lymph Nodes (pN)  Modifier (sn): Only sentinel node(s) evaluated.  Category (pN) pN0  .e to discuss further options of treat  Patient is here to discuss further options of treatment.  She has a T1 a N0 ER [place] positive HER-2 negative tumor in the right breast.  She has no family history of breast cancer.    XRT from 8/5/2019 - 9/3/2019  08/17/19 - started Arimidex    Past Medical History:   Diagnosis Date   • Arthritis    • Carpal tunnel syndrome    • Disease of thyroid gland     Hypothyroidism   • Graves disease    • Hearing loss     earing aids    • History of urinary tract infection    • Hypertension    • Malignant neoplasm of lower-inner quadrant of right breast of female, estrogen receptor positive (HCC) 6/24/2019   • Palpitations     Dr. Cheng PMD Benign    • Seasonal allergies    • Vertigo         Past Surgical History:   Procedure Laterality Date   • BREAST BIOPSY Right     MALIGNANT   • BREAST BIOPSY Right 2013    BENIGN   • BREAST LUMPECTOMY Right 7/8/2019    Procedure:  BREAST LUMPECTOMY WITH SENTINEL NODE BIOPSY AND NEEDLE LOCALIZATION;  Surgeon: Evelin Ayala MD;  Location: [facility];  Service: General   • CARPAL TUNNEL RELEASE Right    • CERVICAL CONE BIOPSY     • CHOLECYSTECTOMY  1983   • COLONOSCOPY     • GALLBLADDER SURGERY     • HYSTERECTOMY  2016   • LAPAROSCOPIC TUBAL LIGATION     • OOPHORECTOMY     • AZ TOTAL ABDOM HYSTERECTOMY Bilateral 7/28/2016    Procedure: TOTAL ABDOMINAL HYSTERECTOMY W/ BSO ;  Surgeon: George Strange MD;  Location: [facility] OR;  Service: Obstetrics/Gynecology   • ROTATOR CUFF REPAIR Right    • TONSILLECTOMY  1963        Current Outpatient Medications on File Prior to Visit   Medication Sig Dispense Refill   • BD Pen Needle Sri U/F 32G X 4 MM misc See Admin Instructions.     • BIOTIN PO Take  by mouth.     • calcium carb-cholecalciferol 600-800 MG-UNIT tablet 2 tab po daily     • celecoxib (CeleBREX) 200 MG capsule Take 200 mg by mouth Daily. PATIENT TO CONTACT DR. EVELIN AYALA REGARDING HOLDING PRIOR TO SURGERY     • cetirizine (ZyrTEC) 10 MG tablet Take 10 mg by mouth daily.     • cyclobenzaprine (FLEXERIL) 10 MG tablet Take 0.5 tablets by mouth 3 (Three) Times a Day As Needed for Muscle Spasms. 20 tablet 0   • DULoxetine (CYMBALTA) 60 MG capsule Take 60 mg by mouth Daily. LAST DOSE 7/17/16     • letrozole (FEMARA) 2.5 MG tablet TAKE 1 TABLET BY MOUTH EVERY DAY 90 tablet 3   • levothyroxine (SYNTHROID, LEVOTHROID) 100 MCG tablet Take 100 mcg by mouth daily.     • losartan-hydrochlorothiazide (HYZAAR) 50-12.5 MG per tablet Take 1 tablet by mouth Daily.       No current facility-administered medications on file prior to visit.        ALLERGIES:  No Known Allergies     Social History     Socioeconomic History   • Marital status:      Spouse name: Mendez   • Number of children: 2   • Years of education: High school   Tobacco Use   • Smoking status: Never Smoker   • Smokeless tobacco: Never Used   Substance and  Sexual Activity   • Alcohol use: No     Comment: maybe once a year   • Drug use: No   • Sexual activity: Yes     Partners: Male     Birth control/protection: Surgical     Comment:         Family History   Problem Relation Age of Onset   • Lymphoma Paternal Uncle 30        non hodgkins   • Lymphoma Nephew 16        non hodgkins   • Stroke Mother    • COPD Father    • Breast cancer Neg Hx    • Malig Hyperthermia Neg Hx         Review of Systems   Constitutional: Positive for fatigue. Negative for activity change, appetite change, chills, diaphoresis, fever and unexpected weight change.   HENT: Negative for hearing loss, mouth sores, nosebleeds, sore throat and trouble swallowing.    Respiratory: Negative for cough, chest tightness, shortness of breath and wheezing.    Cardiovascular: Negative for chest pain, palpitations and leg swelling.   Gastrointestinal: Negative for abdominal distention, abdominal pain, constipation, diarrhea, nausea and vomiting.   Endocrine: Positive for heat intolerance ( Hot flashes ).   Genitourinary: Negative for difficulty urinating, dysuria, frequency, hematuria and urgency.   Musculoskeletal: Positive for arthralgias ( See HPI  ). Negative for joint swelling.        No muscle weakness.   Skin: Negative for rash and wound.   Neurological: Negative for dizziness, seizures, syncope, speech difficulty, weakness, numbness and headaches.   Hematological: Negative for adenopathy. Does not bruise/bleed easily.   Psychiatric/Behavioral: Negative for behavioral problems, confusion, sleep disturbance and suicidal ideas.   All other systems reviewed and are negative.  Patient's arthralgias are much improved after starting Femara      I have reviewed and confirmed the accuracy of the ROS as documented by the MA/LPN/RN Reshma Morales MD        Objective     Vitals:    05/12/22 1149   BP: 122/75   Pulse: 82   Resp: 18   Temp: 97.1 °F (36.2 °C)   TempSrc: Temporal   SpO2: 95%   Weight: 105 kg  "(231 lb 8 oz)   Height: 157.5 cm (62.01\")   PainSc: 0-No pain      Current Status 5/12/2022   ECOG score 0         Physical examination    CONSTITUTIONAL:  Vital signs reviewed.  No distress, looks comfortable.  RESPIRATORY:  Normal respiratory effort.  Lungs clear to auscultation bilaterally.  CARDIOVASCULAR:  Normal S1, S2.  No murmurs rubs or gallops.  No significant lower extremity edema.  BREAST: Right breast: No skin changes, no evidence of breast mass, no nipple discharge, no evidence of any right axillary adenopathy or right supraclavicular adenopathy  Left breast: No evidence of any skin changes, no evidence of any left breast mass and no evidence of left nipple discharge as well as no left axillary adenopathy or left supraclavicular adenopathy.  GASTROINTESTINAL: Abdomen appears unremarkable.  Nontender.  No hepatomegaly.  No splenomegaly.  LYMPHATIC:  No cervical, supraclavicular, axillary lymphadenopathy.  SKIN:  Warm.  No rashes.  PSYCHIATRIC:  Normal judgment and insight.  Normal mood and affect.      RECENT LABS:  Hematology WBC   Date Value Ref Range Status   05/12/2022 7.52 3.40 - 10.80 10*3/mm3 Final   05/09/2018 7.39 4.5 - 11.0 10*3/uL Final     RBC   Date Value Ref Range Status   05/12/2022 4.56 3.77 - 5.28 10*6/mm3 Final   05/09/2018 4.86 4.0 - 5.2 10*6/uL Final     Hemoglobin   Date Value Ref Range Status   05/12/2022 13.9 12.0 - 15.9 g/dL Final   05/09/2018 15.0 12.0 - 16.0 g/dL Final     Hematocrit   Date Value Ref Range Status   05/12/2022 42.8 34.0 - 46.6 % Final   05/09/2018 45.9 36.0 - 46.0 % Final     Platelets   Date Value Ref Range Status   05/12/2022 265 140 - 450 10*3/mm3 Final   05/09/2018 243 140 - 440 10*3/uL Final            Assessment & Plan     1.  T1 a N0 M0 invasive ductal carcinoma of the right breast.  Patient is status post lumpectomy with sentinel lymph node surgery on July 2019.  She is ER positive, [place] positive HER-2/berenice negative.  -September 19, 2018 screening mammogram " showed new irregular nodular density in the deep medial right breast which is suspicious and six-month follow-up suggested.  -November 7, 2018, patient had right diagnostic mammogram which showed small ill-defined opacity in the deep medial right breast but ultrasound did not show that and six-month follow-up suggested  -Sabra 3, 2019, small irregular opacity in the 3:00 axis in the upper medial right breast, 8 mm, suspicious  -Biopsy consistent with invasive ductal carcinoma, 2.8 mm, grade 1, [place] score of 3, associated DCIS, micro papillary and cribriform, ER +%, CT +41-50%, HER-2/berenice 1+ negative, Ki-67 1%  -July 2019, status post right lumpectomy with sentinel lymph nodes, 3 o'clock position, no invasive carcinoma identified on the lumpectomy, single focus of invasive carcinoma 2.8 mm seen at biopsy, DCIS present 7 mm, DCIS in 2 of the 27 blocks, no lymphovascular space invasion, margins clear except close anterior margin from the DCIS is 1.1 mm, 3 lymph nodes negative    · Had a lengthy discussion with patient that she does not require any chemotherapy given the small size, low-grade nature of the tumor with it being ER CT positive HER-2/berenice negative.  · XRT from 8/5/2019 - 9/3/2019  · 08/17/19 - started Arimidex  · 09/30/19 - Patient reports having gained about 20 pounds since the diagnosis of her cancer.  As a result, she also has arthralgia.  · 7/27/2020 patient complained of worsening arthralgias, particularly in her hips since starting Arimidex.  After discussion with Dr. Morales, we will have her hold Arimidex x1 month.  Then start Femara 2.5 mg daily.  I will see her back in 2 months for reevaluation.  · November 22, 2021: Tolerating Femara very well        2.  Osteopenia, reviewed bone density from December 2017 and she does have osteopenia.  We encouraged her to take calcium and vitamin D supplements.  · DEXA scan December 2019 normal bone density.  Stable patient taking calcium and vitamin  D  · Next DEXA scan due December 2021    3.  Lifestyle modification: Discussed in length with patient to exercise 40 minutes/day 5 days a week with 20 minutes of aerobics.  Also encouraged healthy diet.  · Encouraged exercise  · Also proper nutrition    4.  Referral to survivorship clinic, done    5.  Osteoarthritis    6.  Hot flashes secondary to Arimidex.-Now on letrozole and still having hot flashes.  · Hot flashes on letrozole resolved and currently tolerating well    7.  Hypothyroidism stable    8.  Depression on Cymbalta well-controlled  Stable    Plan   1. Continue letrozole 2.5 mg daily.  2. Continue calcium and vitamin D supplements  3. Reviewed bone density from May 2, 2022 and its normal  4. Patient will require screening mammogram end of June 2022  5. Follow-up in 6 months with lab    Monitoring high risk medication    Reshma Morales MD          CC:  MD Evelin Cortez MD Crystal McMahan, MD Robert Schweitzer, MD Janet Leon, MD

## 2022-06-06 ENCOUNTER — APPOINTMENT (OUTPATIENT)
Dept: MAMMOGRAPHY | Facility: HOSPITAL | Age: 62
End: 2022-06-06

## 2022-06-09 ENCOUNTER — LAB (OUTPATIENT)
Dept: LAB | Facility: HOSPITAL | Age: 62
End: 2022-06-09

## 2022-06-09 ENCOUNTER — CLINICAL SUPPORT (OUTPATIENT)
Dept: ONCOLOGY | Facility: HOSPITAL | Age: 62
End: 2022-06-09

## 2022-06-09 DIAGNOSIS — C50.311 MALIGNANT NEOPLASM OF LOWER-INNER QUADRANT OF RIGHT BREAST OF FEMALE, ESTROGEN RECEPTOR POSITIVE: Primary | ICD-10-CM

## 2022-06-09 DIAGNOSIS — Z17.0 MALIGNANT NEOPLASM OF LOWER-INNER QUADRANT OF RIGHT BREAST OF FEMALE, ESTROGEN RECEPTOR POSITIVE: Primary | ICD-10-CM

## 2022-06-09 DIAGNOSIS — E83.52 SERUM CALCIUM ELEVATED: ICD-10-CM

## 2022-06-09 LAB
ALBUMIN SERPL-MCNC: 4.3 G/DL (ref 3.5–5.2)
ALBUMIN/GLOB SERPL: 1.3 G/DL (ref 1.1–2.4)
ALP SERPL-CCNC: 106 U/L (ref 38–116)
ALT SERPL W P-5'-P-CCNC: 28 U/L (ref 0–33)
ANION GAP SERPL CALCULATED.3IONS-SCNC: 14 MMOL/L (ref 5–15)
AST SERPL-CCNC: 35 U/L (ref 0–32)
BASOPHILS # BLD AUTO: 0.05 10*3/MM3 (ref 0–0.2)
BASOPHILS NFR BLD AUTO: 0.7 % (ref 0–1.5)
BILIRUB SERPL-MCNC: 0.6 MG/DL (ref 0.2–1.2)
BUN SERPL-MCNC: 20 MG/DL (ref 6–20)
BUN/CREAT SERPL: 21.3 (ref 7.3–30)
CALCIUM SPEC-SCNC: 10 MG/DL (ref 8.5–10.2)
CHLORIDE SERPL-SCNC: 100 MMOL/L (ref 98–107)
CO2 SERPL-SCNC: 27 MMOL/L (ref 22–29)
CREAT SERPL-MCNC: 0.94 MG/DL (ref 0.6–1.1)
DEPRECATED RDW RBC AUTO: 44.9 FL (ref 37–54)
EGFRCR SERPLBLD CKD-EPI 2021: 67.9 ML/MIN/1.73
EOSINOPHIL # BLD AUTO: 0.28 10*3/MM3 (ref 0–0.4)
EOSINOPHIL NFR BLD AUTO: 4 % (ref 0.3–6.2)
ERYTHROCYTE [DISTWIDTH] IN BLOOD BY AUTOMATED COUNT: 13.1 % (ref 12.3–15.4)
GLOBULIN UR ELPH-MCNC: 3.2 GM/DL (ref 1.8–3.5)
GLUCOSE SERPL-MCNC: 89 MG/DL (ref 74–124)
HCT VFR BLD AUTO: 44.5 % (ref 34–46.6)
HGB BLD-MCNC: 14.5 G/DL (ref 12–15.9)
IMM GRANULOCYTES # BLD AUTO: 0.01 10*3/MM3 (ref 0–0.05)
IMM GRANULOCYTES NFR BLD AUTO: 0.1 % (ref 0–0.5)
LYMPHOCYTES # BLD AUTO: 1.57 10*3/MM3 (ref 0.7–3.1)
LYMPHOCYTES NFR BLD AUTO: 22.5 % (ref 19.6–45.3)
MCH RBC QN AUTO: 30.3 PG (ref 26.6–33)
MCHC RBC AUTO-ENTMCNC: 32.6 G/DL (ref 31.5–35.7)
MCV RBC AUTO: 92.9 FL (ref 79–97)
MONOCYTES # BLD AUTO: 0.62 10*3/MM3 (ref 0.1–0.9)
MONOCYTES NFR BLD AUTO: 8.9 % (ref 5–12)
NEUTROPHILS NFR BLD AUTO: 4.46 10*3/MM3 (ref 1.7–7)
NEUTROPHILS NFR BLD AUTO: 63.8 % (ref 42.7–76)
NRBC BLD AUTO-RTO: 0 /100 WBC (ref 0–0.2)
PLATELET # BLD AUTO: 230 10*3/MM3 (ref 140–450)
PMV BLD AUTO: 11 FL (ref 6–12)
POTASSIUM SERPL-SCNC: 3.8 MMOL/L (ref 3.5–4.7)
PROT SERPL-MCNC: 7.5 G/DL (ref 6.3–8)
RBC # BLD AUTO: 4.79 10*6/MM3 (ref 3.77–5.28)
SODIUM SERPL-SCNC: 141 MMOL/L (ref 134–145)
WBC NRBC COR # BLD: 6.99 10*3/MM3 (ref 3.4–10.8)

## 2022-06-09 PROCEDURE — G0463 HOSPITAL OUTPT CLINIC VISIT: HCPCS

## 2022-06-09 PROCEDURE — 36415 COLL VENOUS BLD VENIPUNCTURE: CPT

## 2022-06-09 PROCEDURE — 85025 COMPLETE CBC W/AUTO DIFF WBC: CPT

## 2022-06-09 PROCEDURE — 80053 COMPREHEN METABOLIC PANEL: CPT

## 2022-06-09 NOTE — PROGRESS NOTES
Pt present for visit. Confirms taking letrozole and still having occasional hot flashes. Pt reports the hot flashes are manageable. Counts are WNL. Calcium improved. The pt has been off calcium and vitamin D supplement. Message sent to Dr. ZELAYA if the pt should remain off of those. Pt v/u and given copy of labs.    Lab Results   Component Value Date    WBC 6.99 06/09/2022    HGB 14.5 06/09/2022    HCT 44.5 06/09/2022    MCV 92.9 06/09/2022     06/09/2022     Lab Results   Component Value Date    GLUCOSE 89 06/09/2022    BUN 20 06/09/2022    CREATININE 0.94 06/09/2022    EGFRIFNONA 62 11/22/2021    BCR 21.3 06/09/2022    K 3.8 06/09/2022    CO2 27.0 06/09/2022    CALCIUM 10.0 06/09/2022    ALBUMIN 4.30 06/09/2022    AST 35 (H) 06/09/2022    ALT 28 06/09/2022

## 2022-06-10 ENCOUNTER — HOSPITAL ENCOUNTER (OUTPATIENT)
Dept: MAMMOGRAPHY | Facility: HOSPITAL | Age: 62
Discharge: HOME | End: 2022-06-10
Admitting: INTERNAL MEDICINE

## 2022-06-10 DIAGNOSIS — Z17.0 MALIGNANT NEOPLASM OF LOWER-INNER QUADRANT OF RIGHT BREAST OF FEMALE, ESTROGEN RECEPTOR POSITIVE: ICD-10-CM

## 2022-06-10 DIAGNOSIS — C50.311 MALIGNANT NEOPLASM OF LOWER-INNER QUADRANT OF RIGHT BREAST OF FEMALE, ESTROGEN RECEPTOR POSITIVE: ICD-10-CM

## 2022-06-10 PROCEDURE — 77067 SCR MAMMO BI INCL CAD: CPT

## 2022-06-10 PROCEDURE — 77063 BREAST TOMOSYNTHESIS BI: CPT

## 2022-06-14 ENCOUNTER — TELEPHONE (OUTPATIENT)
Dept: ONCOLOGY | Facility: CLINIC | Age: 62
End: 2022-06-14

## 2022-06-14 NOTE — TELEPHONE ENCOUNTER
Caller: Emily Azevedo    Relationship: Self    Best call back number: [phone]      What was the call regarding: PATIENT STATED THAT DR FINNEY NURSE WAS GOING TO GET BACK TO HER REGARDING HER CALCIUM AND VIT D. SHE HAS NOT HEARD FROM ANYONE      Do you require a callback: YES

## 2022-06-14 NOTE — TELEPHONE ENCOUNTER
Call back to MsArline Jolly to let her know that since her calcium level was back to normal, she could take one calcium tablet daily, and we will see how her labs are at the next visit.  Patient verbalized understanding and thanks for the call.

## 2022-08-18 ENCOUNTER — OFFICE VISIT (OUTPATIENT)
Dept: OBSTETRICS AND GYNECOLOGY | Age: 62
End: 2022-08-18

## 2022-08-18 ENCOUNTER — PATIENT ROUNDING (BHMG ONLY) (OUTPATIENT)
Dept: OBSTETRICS AND GYNECOLOGY | Age: 62
End: 2022-08-18

## 2022-08-18 VITALS
WEIGHT: 218.4 LBS | BODY MASS INDEX: 40.19 KG/M2 | DIASTOLIC BLOOD PRESSURE: 78 MMHG | HEIGHT: 62 IN | SYSTOLIC BLOOD PRESSURE: 122 MMHG

## 2022-08-18 DIAGNOSIS — Z01.419 WELL WOMAN EXAM WITH ROUTINE GYNECOLOGICAL EXAM: Primary | ICD-10-CM

## 2022-08-18 PROCEDURE — 99396 PREV VISIT EST AGE 40-64: CPT | Performed by: NURSE PRACTITIONER

## 2022-08-18 RX ORDER — LIRAGLUTIDE 6 MG/ML
INJECTION, SOLUTION SUBCUTANEOUS
COMMUNITY
Start: 2022-08-01

## 2022-08-18 RX ORDER — IRBESARTAN AND HYDROCHLOROTHIAZIDE 300; 12.5 MG/1; MG/1
TABLET, FILM COATED ORAL
COMMUNITY
Start: 2022-08-14

## 2022-08-18 NOTE — PROGRESS NOTES
A MY CHART MESSAGE HAS BEEN SENT TO THE PATIENT FOR [facility]OUNDING.

## 2022-08-18 NOTE — PROGRESS NOTES
[facility]  Routine Annual Visit    2022    Patient: Emily Azevedo          MR#:[id number]      History of Present Illness    62 y.o. female  who presents for annual exam as a new patient.    She is a former patient of Dr. Moraes.     Emily was dx with right breast cancer in 2019. She s/p lumpectomy and radiation. Dr. Ayala, breast surgery. She continues to see DR. Morales, oncology every 6 months. She manages her mammograms -  Last mammogram negative 2022    H/O hysterectomy w/bso     She has no complaints     No LMP recorded (lmp unknown). Patient has had a hysterectomy.  Obstetric History:  OB History        2    Para   2    Term   2            AB        Living   2       SAB        IAB        Ectopic        Molar        Multiple        Live Births   2               Menstrual History:     No LMP recorded (lmp unknown). Patient has had a hysterectomy.       Sexual History:       ________________________________________  Patient Active Problem List   Diagnosis   • Malignant neoplasm of lower-inner quadrant of right breast of female, estrogen receptor positive (HCC)   • Weight gain   • Arthralgia   • Use of anastrozole (Arimidex)   • Osteoarthritis   • Hot flashes related to aromatase inhibitor therapy       Past Medical History:   Diagnosis Date   • Arthritis    • Carpal tunnel syndrome    • Disease of thyroid gland     Hypothyroidism   • Graves disease    • Hearing loss     earing aids    • History of urinary tract infection    • Hypertension    • Malignant neoplasm of lower-inner quadrant of right breast of female, estrogen receptor positive (HCC) 2019   • Palpitations     Dr. Cheng PMD Benign    • Seasonal allergies    • Vertigo        Past Surgical History:   Procedure Laterality Date   • BREAST BIOPSY Right     MALIGNANT   • BREAST BIOPSY Right     BENIGN   • BREAST LUMPECTOMY Right 2019    Procedure: BREAST LUMPECTOMY WITH SENTINEL NODE BIOPSY  AND NEEDLE LOCALIZATION;  Surgeon: Evelin Nails MD;  Location:  [facility];  Service: General   • CARPAL TUNNEL RELEASE Right    • CERVICAL CONE BIOPSY     • CHOLECYSTECTOMY  1983   • COLONOSCOPY     • GALLBLADDER SURGERY     • HYSTERECTOMY  2016   • LAPAROSCOPIC TUBAL LIGATION     • OOPHORECTOMY     • MD TOTAL ABDOM HYSTERECTOMY Bilateral 7/28/2016    Procedure: TOTAL ABDOMINAL HYSTERECTOMY W/ BSO ;  Surgeon: George Strange MD;  [facility];  Service: Obstetrics/Gynecology   • ROTATOR CUFF REPAIR Right    • TONSILLECTOMY  1963       Social History     Tobacco Use   Smoking Status Never Smoker   Smokeless Tobacco Never Used       Family History   Problem Relation Age of Onset   • Lymphoma Paternal Uncle 30        non hodgkins   • Lymphoma Nephew 16        non hodgkins   • Stroke Mother    • COPD Father    • Breast cancer Neg Hx    • Malig Hyperthermia Neg Hx        Prior to Admission medications    Medication Sig Start Date End Date Taking? Authorizing Provider   BD Pen Needle Sri U/F 32G X 4 MM misc See Admin Instructions. 9/20/20  Yes Jakob Martínez MD   BIOTIN PO Take  by mouth.   Yes ProviderJakob MD   calcium carb-cholecalciferol 600-800 MG-UNIT tablet 2 tab po daily   Yes Jakob Martínez MD   celecoxib (CeleBREX) 200 MG capsule Take 200 mg by mouth Daily. PATIENT TO CONTACT DR. EVELIN NAILS REGARDING HOLDING PRIOR TO SURGERY   Yes ProviderJakob MD   cetirizine (ZyrTEC) 10 MG tablet Take 10 mg by mouth daily.   Yes Jakob Martínez MD   DULoxetine (CYMBALTA) 60 MG capsule Take 60 mg by mouth Daily. LAST DOSE 7/17/16   Yes Jakob Martínez MD   irbesartan-hydrochlorothiazide (AVALIDE) 300-12.5 MG tablet  8/14/22  Yes Jakob Martínez MD   letrozole (FEMARA) 2.5 MG tablet TAKE 1 TABLET BY MOUTH EVERY DAY 11/9/21  Yes Reshma Morales MD   levothyroxine (SYNTHROID, LEVOTHROID) 100 MCG tablet Take 100 mcg by mouth daily.   Yes  "Provider, MD Jakob   losartan-hydrochlorothiazide (HYZAAR) 50-12.5 MG per tablet Take 1 tablet by mouth Daily.   Yes Provider, MD Jakob   Saxenda 18 MG/3ML injection pen INJECT 0.6 MG SUBQ DAILY FOR 1 WEEK, INCREASE BY 0.6 MG EVERY WEEK UNTIL REACH 3 MG DAILY. 8/1/22  Yes Provider, MD Jakob   cyclobenzaprine (FLEXERIL) 10 MG tablet Take 0.5 tablets by mouth 3 (Three) Times a Day As Needed for Muscle Spasms. 11/10/19 8/18/22  Nelda Cox PA-C     ________________________________________    The following portions of the patient's history were reviewed and updated as appropriate: allergies, current medications, past family history, past medical history, past social history, past surgical history and problem list.    Review of Systems   Constitutional: Negative.    HENT: Negative.    Eyes: Negative for visual disturbance.   Respiratory: Negative for cough, shortness of breath and wheezing.    Cardiovascular: Negative for chest pain, palpitations and leg swelling.   Gastrointestinal: Negative for abdominal distention, abdominal pain, blood in stool, constipation, diarrhea, nausea and vomiting.   Endocrine: Negative for cold intolerance and heat intolerance.   Genitourinary: Negative for difficulty urinating, dyspareunia, dysuria, frequency, genital sores, hematuria, menstrual problem, pelvic pain, urgency, vaginal bleeding, vaginal discharge and vaginal pain.   Musculoskeletal: Negative.    Skin: Negative.    Neurological: Negative for dizziness, weakness, light-headedness, numbness and headaches.   Hematological: Negative.    Psychiatric/Behavioral: Negative.    Breasts: negative for lumps skin changes, dimpling, swelling, nipple changes/discharge bilaterally       Objective   Physical Exam    /78   Ht 157.5 cm (62.01\")   Wt 99.1 kg (218 lb 6.4 oz)   LMP  (LMP Unknown)   Breastfeeding No   BMI 39.93 kg/m²    BP Readings from Last 3 Encounters:   08/18/22 122/78   05/12/22 122/75 " "  11/22/21 117/77      Wt Readings from Last 3 Encounters:   08/18/22 99.1 kg (218 lb 6.4 oz)   05/12/22 105 kg (231 lb 8 oz)   11/22/21 103 kg (226 lb 1.6 oz)        BMI: Estimated body mass index is 39.93 kg/m² as calculated from the following:    Height as of this encounter: 157.5 cm (62.01\").    Weight as of this encounter: 99.1 kg (218 lb 6.4 oz).            General:   alert, appears stated age and cooperative   Heart: regular rate and rhythm, S1, S2 normal, no murmur, click, rub or gallop   Lungs: clear to auscultation bilaterally   Abdomen: soft, non-tender, without masses or organomegaly   Breast: inspection negative, no nipple discharge or bleeding, no masses or nodularity palpable   Vulva: External genitalia including bartholin's glands, Urethra, [place]'s gland and urethra meatus are normal, Perineum, rectum and anus appear normal  and Bladder appears normal without significant prolapse    Vagina: normal mucosa, normal discharge   Cervix: absent   Uterus: absent    Adnexa: no mass, fullness, tenderness     Assessment:    Diagnoses and all orders for this visit:    1. Well woman exam with routine gynecological exam (Primary)  -     IgP, Aptima HPV        Healthy lifestyle modifications discussed, counseled on self breast exams and bone health    All of the patient's questions were addressed and answered, I have encouraged her to call for today's test results if she has not received them within 10 days.  Patient is advised to call with any change in her condition or with any other questions, otherwise return in 12 months for annual examination.      Mechelle Santamaria, MARILEE  8/18/2022 09:56 EDT  "

## 2022-08-23 LAB
CYTOLOGIST CVX/VAG CYTO: NORMAL
CYTOLOGY CVX/VAG DOC CYTO: NORMAL
CYTOLOGY CVX/VAG DOC THIN PREP: NORMAL
DX ICD CODE: NORMAL
HIV 1 & 2 AB SER-IMP: NORMAL
HPV I/H RISK 4 DNA CVX QL PROBE+SIG AMP: NEGATIVE
OTHER STN SPEC: NORMAL
STAT OF ADQ CVX/VAG CYTO-IMP: NORMAL

## 2022-10-14 RX ORDER — LETROZOLE 2.5 MG/1
TABLET, FILM COATED ORAL
Qty: 90 TABLET | Refills: 0 | Status: SHIPPED | OUTPATIENT
Start: 2022-10-14 | End: 2023-02-06

## 2022-11-15 ENCOUNTER — LAB (OUTPATIENT)
Dept: LAB | Facility: HOSPITAL | Age: 62
End: 2022-11-15

## 2022-11-15 ENCOUNTER — OFFICE VISIT (OUTPATIENT)
Dept: ONCOLOGY | Facility: CLINIC | Age: 62
End: 2022-11-15

## 2022-11-15 ENCOUNTER — TELEPHONE (OUTPATIENT)
Dept: ONCOLOGY | Facility: CLINIC | Age: 62
End: 2022-11-15

## 2022-11-15 VITALS
SYSTOLIC BLOOD PRESSURE: 102 MMHG | WEIGHT: 212.1 LBS | DIASTOLIC BLOOD PRESSURE: 73 MMHG | HEIGHT: 62 IN | TEMPERATURE: 97.1 F | OXYGEN SATURATION: 97 % | BODY MASS INDEX: 39.03 KG/M2 | RESPIRATION RATE: 16 BRPM | HEART RATE: 76 BPM

## 2022-11-15 DIAGNOSIS — C50.311 MALIGNANT NEOPLASM OF LOWER-INNER QUADRANT OF RIGHT BREAST OF FEMALE, ESTROGEN RECEPTOR POSITIVE: Primary | ICD-10-CM

## 2022-11-15 DIAGNOSIS — Z79.811 AROMATASE INHIBITOR USE: ICD-10-CM

## 2022-11-15 DIAGNOSIS — Z17.0 MALIGNANT NEOPLASM OF LOWER-INNER QUADRANT OF RIGHT BREAST OF FEMALE, ESTROGEN RECEPTOR POSITIVE: ICD-10-CM

## 2022-11-15 DIAGNOSIS — C50.311 MALIGNANT NEOPLASM OF LOWER-INNER QUADRANT OF RIGHT BREAST OF FEMALE, ESTROGEN RECEPTOR POSITIVE: ICD-10-CM

## 2022-11-15 DIAGNOSIS — Z17.0 MALIGNANT NEOPLASM OF LOWER-INNER QUADRANT OF RIGHT BREAST OF FEMALE, ESTROGEN RECEPTOR POSITIVE: Primary | ICD-10-CM

## 2022-11-15 LAB
ALBUMIN SERPL-MCNC: 4.4 G/DL (ref 3.5–5.2)
ALBUMIN/GLOB SERPL: 1.4 G/DL (ref 1.1–2.4)
ALP SERPL-CCNC: 104 U/L (ref 38–116)
ALT SERPL W P-5'-P-CCNC: 27 U/L (ref 0–33)
ANION GAP SERPL CALCULATED.3IONS-SCNC: 13.4 MMOL/L (ref 5–15)
AST SERPL-CCNC: 35 U/L (ref 0–32)
BASOPHILS # BLD AUTO: 0.04 10*3/MM3 (ref 0–0.2)
BASOPHILS NFR BLD AUTO: 0.7 % (ref 0–1.5)
BILIRUB SERPL-MCNC: 0.5 MG/DL (ref 0.2–1.2)
BUN SERPL-MCNC: 23 MG/DL (ref 6–20)
BUN/CREAT SERPL: 22.1 (ref 7.3–30)
CALCIUM SPEC-SCNC: 10.5 MG/DL (ref 8.5–10.2)
CHLORIDE SERPL-SCNC: 100 MMOL/L (ref 98–107)
CO2 SERPL-SCNC: 30.6 MMOL/L (ref 22–29)
CREAT SERPL-MCNC: 1.04 MG/DL (ref 0.6–1.1)
DEPRECATED RDW RBC AUTO: 45.8 FL (ref 37–54)
EGFRCR SERPLBLD CKD-EPI 2021: 60.1 ML/MIN/1.73
EOSINOPHIL # BLD AUTO: 0.2 10*3/MM3 (ref 0–0.4)
EOSINOPHIL NFR BLD AUTO: 3.3 % (ref 0.3–6.2)
ERYTHROCYTE [DISTWIDTH] IN BLOOD BY AUTOMATED COUNT: 13.3 % (ref 12.3–15.4)
GLOBULIN UR ELPH-MCNC: 3.1 GM/DL (ref 1.8–3.5)
GLUCOSE SERPL-MCNC: 95 MG/DL (ref 74–124)
HCT VFR BLD AUTO: 43.2 % (ref 34–46.6)
HGB BLD-MCNC: 13.9 G/DL (ref 12–15.9)
IMM GRANULOCYTES # BLD AUTO: 0.02 10*3/MM3 (ref 0–0.05)
IMM GRANULOCYTES NFR BLD AUTO: 0.3 % (ref 0–0.5)
LYMPHOCYTES # BLD AUTO: 1.6 10*3/MM3 (ref 0.7–3.1)
LYMPHOCYTES NFR BLD AUTO: 26 % (ref 19.6–45.3)
MCH RBC QN AUTO: 30.2 PG (ref 26.6–33)
MCHC RBC AUTO-ENTMCNC: 32.2 G/DL (ref 31.5–35.7)
MCV RBC AUTO: 93.7 FL (ref 79–97)
MONOCYTES # BLD AUTO: 0.54 10*3/MM3 (ref 0.1–0.9)
MONOCYTES NFR BLD AUTO: 8.8 % (ref 5–12)
NEUTROPHILS NFR BLD AUTO: 3.75 10*3/MM3 (ref 1.7–7)
NEUTROPHILS NFR BLD AUTO: 60.9 % (ref 42.7–76)
NRBC BLD AUTO-RTO: 0 /100 WBC (ref 0–0.2)
PLATELET # BLD AUTO: 240 10*3/MM3 (ref 140–450)
PMV BLD AUTO: 10.9 FL (ref 6–12)
POTASSIUM SERPL-SCNC: 4.1 MMOL/L (ref 3.5–4.7)
PROT SERPL-MCNC: 7.5 G/DL (ref 6.3–8)
RBC # BLD AUTO: 4.61 10*6/MM3 (ref 3.77–5.28)
SODIUM SERPL-SCNC: 144 MMOL/L (ref 134–145)
WBC NRBC COR # BLD: 6.15 10*3/MM3 (ref 3.4–10.8)

## 2022-11-15 PROCEDURE — 85025 COMPLETE CBC W/AUTO DIFF WBC: CPT

## 2022-11-15 PROCEDURE — 36415 COLL VENOUS BLD VENIPUNCTURE: CPT

## 2022-11-15 PROCEDURE — 99214 OFFICE O/P EST MOD 30 MIN: CPT | Performed by: NURSE PRACTITIONER

## 2022-11-15 PROCEDURE — 80053 COMPREHEN METABOLIC PANEL: CPT

## 2022-11-15 NOTE — PROGRESS NOTES
Subjective     REASON FOR FOLLOW UP:   T1 a N0 M0 invasive ductal carcinoma of the right breast.  Patient is status post lumpectomy with sentinel lymph node surgery on July 2019.  She is ER positive, DE positive HER-2/berenice negative.  · XRT from 8/5/2019 - 9/3/2019  · 08/17/19 - started Arimidex  · Switched to Femara August 27, 2020 which she is tolerating well                             HISTORY OF PRESENT ILLNESS:  The patient is a 62 y.o. year old female with history of T1 a N0 M0 invasive ductal carcinoma the right breast s/p lumpectomy in July 2019.she was initially treated with Arimidex beginning in August 2019 however was switched to Femara as she could not tolerate Arimidex secondary to arthralgias.    Patient has had much better tolerance with Femara.  As she is reviewed back today she notes generally being very hot whenever she exerts herself.  She does not describe this as a hot flash but again just being very hot and sweaty more than usual when exerting herself.  This has been the case since starting AI therapy.  Otherwise her arthralgias are much better on Femara.  She does continue on Cymbalta and Celebrex with noted underlying osteoarthritis.    Patient underwent routine DEXA scan in May 2022 with normal bone density noted.  She also had annual mammogram in June, benign.    She denies other concerns at this time.      ONCOLOGIC HISTORY  Back in October 2019 she had a mammogram which showed an abnormality in the right breast, which showed a new irregular nodular opacity in the deep medial right breast near the chest wall measuring over 1 cm in size.  She had circumscribed benign appearing nodule in the medial right breast near the nipple which is also new.  Likely thought to be a cyst.  Diagnostic mammogram was suggested.  Patient then underwent diagnostic mammogram November 7, 2018 and was thought to represent fibroglandular tissue.  Ultrasound showed no abnormality in the medial right breast that was  seen on screening mammogram.  However a six-month follow-up suggested.    Repeat diagnostic mammogram Sabra 3, 2019 was abnormal and biopsy confirmed invasive ductal carcinoma, grade 1, ER DE positive HER-2/berenice negative.  Subsequently patient underwent lumpectomy on July 8, 2019 with sentinel lymph node biopsy.    9/19/2018: Bilateral screening mammogram  IMPRESSION:  1. New irregular nodular opacity in the deep medial right breast.  Suspicious imaging features. Recall for additional diagnostic imaging is  recommended.  2. Circumscribed benign-appearing nodule in the medial right breast near  the nipple is also new. Ultrasound imaging of this nodule is recommended  to evaluate for a probable cyst or similar benign nodule.    November 7, 2018: Right diagnostic mammogram  MAMMOGRAM FINDINGS:  Small focal ill-defined opacity is again noted within the deep medial  right breast along the 3:00 axis. This is visible in retrospect on each  of the prior studies, and may therefore potentially represent focally  prominent normal fibroglandular tissue. Ultrasound imaging was then  Performed.    November 7, 2019: Ultrasound of right breast  IMPRESSION:  1. Probably benign examination.  2. Small ill-defined opacity within the deep medial right breast showing  no corresponding abnormality on directed ultrasound imaging today.  3. Follow-up unilateral mammogram in six months is indicated.    Sabra 3, 2019: Right diagnostic mammogram and right breast ultrasound  MAMMOGRAM FINDINGS:  Small focal irregular opacity is again noted along the 3:00 axis in the  deep portion of the right breast about 9 cm from the nipple measuring  about 8 mm. Linear opacities surround the lesion in a radial pattern.  The morphology is concerning, particularly on tomogram images today.     ULTRASOUND FINDINGS:  High-resolution grayscale ultrasound imaging directed to this portion of  the breast today does show a subtle focus of acoustic shadowing,  although  mass is not clearly delineated.     IMPRESSION:  1.  Small irregular lesion in the deep medial right breast with  suspicious mammographic features, difficult to identify by ultrasound.  2.  Stereotactic core needle biopsy of the lesion is recommended for  further assessment.    June 13, 2019: Stereotactic biopsy of the right breast medial 3 o'clock position lesion shows invasive carcinoma of no special type, ductal, 2.8 mm, grade 1, Meghan score of 3 with associated DCIS with cribriform and micropapillary, low nuclear grade,    ER: %  WV: 41-50%  HER-2/berenice: 1+ negative  Ki-67 1%    June 21, 2019: MRI breast bilateral  IMPRESSION:  Small marking clip and postbiopsy changes in the lower inner  quadrant of the right breast as described in more detail above. There is  no MRI evidence of residual neoplasm. There are no suspicious findings  in the left breast.    July 8, 2019: Status post right needle localized partial mastectomy with sentinel lymph node biopsy  Synoptic Checklist    INVASIVE CARCINOMA OF THE BREAST (Breast Invasive - All Specimens)  Resulting Labs  [phone]  CLINICAL  Radiologic Finding Mass or architectural distortion  [facility],  [address] [place] [address]  PATIENT: Emily Azevedo  MRN: [id number] CSN: [id number]  Page: 2 of 4 Printed: 7/10/2019 11:35 AM  Right  .  TUMOR  Tumor Site: Invasive Carcinoma Upper inner quadrant  Clock Position of Tumor Site 3 o'clock  Histologic Type Invasive carcinoma of no special type (ductal, not otherwise  specified)  Histologic Grade (Rosebud Histologic Score) No residual invasive carcinoma  Tumor Size Greatest dimension of largest invasive focus in Millimeters (mm): 2.8  mm Millimeters (mm)  Tumor Focality Single focus of invasive carcinoma  Ductal Carcinoma In Situ (DCIS) Present  Negative for extensive intraductal component (EIC)  Size (Extent) of DCIS Estimated size of DCIS greatest dimension in  Millimeters (mm) is at  least: 7 mm Millimeters (mm)  Number of Blocks with DCIS 2  Number of Blocks Examined 27  Architectural Patterns Cribriform  Nuclear Grade Grade I (low)  Necrosis Not identified  Lobular Carcinoma In Situ (LCIS) No LCIS in specimen  Tumor Extent  Accessory Findings  Lymphovascular Invasion Not identified  Dermal Lymphovascular Invasion No skin present  Microcalcifications Not identified  Treatment Effect No known presurgical therapy  .  MARGINS  DCIS Margins Uninvolved by DCIS  Distance of DCIS to Anterior Margin in Millimeters (mm) 1.1 Millimeters (mm)  Distance of DCIS to Posterior Margin in Millimeters (mm) 32 Millimeters (mm)  Distance of DCIS to Superior Margin in Millimeters (mm) 5.5 Millimeters (mm)  Distance of DCIS to Inferior Margin in Millimeters (mm) 5.4 Millimeters (mm)  Distance of DCIS to Medial Margin in Millimeters (mm) 40 Millimeters (mm)  Distance of DCIS to Lateral Margin in Millimeters (mm) 21 Millimeters (mm)  Distance of DCIS from Closest Margin in Millimeters (mm) 1.1 mm Millimeters (mm)  Closest Margin Anterior  .  LYMPH NODES  Regional Lymph Nodes Uninvolved by tumor cells  Number of Lymph Nodes Examined 3  Number of [place] Nodes Examined 3  .  PATHOLOGIC STAGE CLASSIFICATION (pTNM, AJCC 8th Edition)  TNM Descriptors Not applicable  Primary Tumor (Invasive Carcinoma) (pT) pT1a  Regional Lymph Nodes (pN)  Modifier (sn): Only sentinel node(s) evaluated.  Category (pN) pN0  .e to discuss further options of treat  Patient is here to discuss further options of treatment.  She has a T1 a N0 ER MI positive HER-2 negative tumor in the right breast.  She has no family history of breast cancer.    XRT from 8/5/2019 - 9/3/2019  08/17/19 - started Arimidex    Past Medical History:   Diagnosis Date   • Arthritis    • Carpal tunnel syndrome    • Disease of thyroid gland     Hypothyroidism   • Graves disease    • Hearing loss     earing aids    • History of urinary tract  infection    • Hypertension    • Malignant neoplasm of lower-inner quadrant of right breast of female, estrogen receptor positive (HCC) 6/24/2019   • Palpitations     Dr. Cheng PMD Benign    • Seasonal allergies    • Vertigo         Past Surgical History:   Procedure Laterality Date   • BREAST BIOPSY Right     MALIGNANT   • BREAST BIOPSY Right 2013    BENIGN   • BREAST LUMPECTOMY Right 7/8/2019    Procedure: BREAST LUMPECTOMY WITH SENTINEL NODE BIOPSY AND NEEDLE LOCALIZATION;  Surgeon: Evelin Ayala MD;  Location: [facility];  Service: General   • CARPAL TUNNEL RELEASE Right    • CERVICAL CONE BIOPSY     • CHOLECYSTECTOMY  1983   • COLONOSCOPY     • GALLBLADDER SURGERY     • HYSTERECTOMY  2016   • LAPAROSCOPIC TUBAL LIGATION     • OOPHORECTOMY     • MA TOTAL ABDOM HYSTERECTOMY Bilateral 7/28/2016    Procedure: TOTAL ABDOMINAL HYSTERECTOMY W/ BSO ;  Surgeon: George Strange MD;  Location: [facility]  Service: Obstetrics/Gynecology   • ROTATOR CUFF REPAIR Right    • TONSILLECTOMY  1963        Current Outpatient Medications on File Prior to Visit   Medication Sig Dispense Refill   • BD Pen Needle Sri U/F 32G X 4 MM misc See Admin Instructions.     • BIOTIN PO Take  by mouth.     • calcium carb-cholecalciferol 600-800 MG-UNIT tablet 2 tab po daily     • celecoxib (CeleBREX) 200 MG capsule Take 200 mg by mouth Daily. PATIENT TO CONTACT DR. EVELIN AYALA REGARDING HOLDING PRIOR TO SURGERY     • cetirizine (ZyrTEC) 10 MG tablet Take 10 mg by mouth daily.     • DULoxetine (CYMBALTA) 60 MG capsule Take 60 mg by mouth Daily. LAST DOSE 7/17/16     • irbesartan-hydrochlorothiazide (AVALIDE) 300-12.5 MG tablet      • letrozole (FEMARA) 2.5 MG tablet TAKE 1 TABLET BY MOUTH EVERY DAY 90 tablet 0   • levothyroxine (SYNTHROID, LEVOTHROID) 100 MCG tablet Take 100 mcg by mouth daily.     • Saxenda 18 MG/3ML injection pen INJECT 0.6 MG SUBQ DAILY FOR 1 WEEK, INCREASE BY 0.6 MG EVERY WEEK UNTIL REACH 3 MG  DAILY.       No current facility-administered medications on file prior to visit.        ALLERGIES:  No Known Allergies     Social History     Socioeconomic History   • Marital status:      Spouse name: Mendez   • Number of children: 2   • Years of education: High school   Tobacco Use   • Smoking status: Never   • Smokeless tobacco: Never   Vaping Use   • Vaping Use: Never used   Substance and Sexual Activity   • Alcohol use: No     Comment: maybe once a year   • Drug use: No   • Sexual activity: Yes     Partners: Male     Birth control/protection: Surgical     Comment:         Family History   Problem Relation Age of Onset   • Lymphoma Paternal Uncle 30        non hodgkins   • Lymphoma Nephew 16        non hodgkins   • Stroke Mother    • COPD Father    • Breast cancer Neg Hx    • Malig Hyperthermia Neg Hx         Review of Systems   Constitutional: Negative for activity change, appetite change, chills, diaphoresis, fever and unexpected weight change.   HENT: Negative for hearing loss, mouth sores, nosebleeds, sore throat and trouble swallowing.    Respiratory: Negative for cough, chest tightness, shortness of breath and wheezing.    Cardiovascular: Negative for chest pain, palpitations and leg swelling.   Gastrointestinal: Negative for abdominal distention, abdominal pain, constipation, diarrhea, nausea and vomiting.   Endocrine: Positive for heat intolerance (see HPI).   Genitourinary: Negative for difficulty urinating, dysuria, frequency, hematuria and urgency.   Musculoskeletal: Positive for arthralgias (better). Negative for joint swelling.        No muscle weakness.   Skin: Negative for rash and wound.   Neurological: Negative for dizziness, seizures, syncope, speech difficulty, weakness, numbness and headaches.   Hematological: Negative for adenopathy. Does not bruise/bleed easily.   Psychiatric/Behavioral: Negative for behavioral problems, confusion, sleep disturbance and suicidal ideas.   All  "other systems reviewed and are negative.    ROS reviewed and updated 11/15/22    Objective     Vitals:    11/15/22 1252   BP: 102/73   Pulse: 76   Resp: 16   Temp: 97.1 °F (36.2 °C)   TempSrc: Temporal   SpO2: 97%   Weight: 96.2 kg (212 lb 1.6 oz)   Height: 157.5 cm (62.01\")   PainSc: 0-No pain      Current Status 11/15/2022   ECOG score 0     Physical examination    CONSTITUTIONAL:  Vital signs reviewed.  No distress, looks comfortable.  RESPIRATORY:  Normal respiratory effort.  Lungs clear to auscultation bilaterally.  CARDIOVASCULAR:  Normal S1, S2.  No murmurs rubs or gallops.  No significant lower extremity edema.  BREAST: Right breast: No skin changes, no evidence of breast mass, no nipple discharge, no evidence of any right axillary adenopathy or right supraclavicular adenopathy  Left breast: No evidence of any skin changes, no evidence of any left breast mass and no evidence of left nipple discharge as well as no left axillary adenopathy or left supraclavicular adenopathy.  GASTROINTESTINAL: Abdomen appears unremarkable.  Nontender.  No hepatomegaly.  No splenomegaly.  LYMPHATIC:  No cervical, supraclavicular, axillary lymphadenopathy.  SKIN:  Warm.  No rashes.  PSYCHIATRIC:  Normal judgment and insight.  Normal mood and affect.      RECENT LABS:  Results from last 7 days   Lab Units 11/15/22  1206   WBC 10*3/mm3 6.15   NEUTROS ABS 10*3/mm3 3.75   HEMOGLOBIN g/dL 13.9   HEMATOCRIT % 43.2   PLATELETS 10*3/mm3 240                 Assessment & Plan     1.  T1 a N0 M0 invasive ductal carcinoma of the right breast.  Patient is status post lumpectomy with sentinel lymph node surgery on July 2019.  She is ER positive, RI positive HER-2/berenice negative.  -September 19, 2018 screening mammogram showed new irregular nodular density in the deep medial right breast which is suspicious and six-month follow-up suggested.  -November 7, 2018, patient had right diagnostic mammogram which showed small ill-defined opacity in the " deep medial right breast but ultrasound did not show that and six-month follow-up suggested  -Sabra 3, 2019, small irregular opacity in the 3:00 axis in the upper medial right breast, 8 mm, suspicious  -Biopsy consistent with invasive ductal carcinoma, 2.8 mm, grade 1, [place]enburg score of 3, associated DCIS, micro papillary and cribriform, ER +%, NE +41-50%, HER-2/berenice 1+ negative, Ki-67 1%  -July 2019, status post right lumpectomy with sentinel lymph nodes, 3 o'clock position, no invasive carcinoma identified on the lumpectomy, single focus of invasive carcinoma 2.8 mm seen at biopsy, DCIS present 7 mm, DCIS in 2 of the 27 blocks, no lymphovascular space invasion, margins clear except close anterior margin from the DCIS is 1.1 mm, 3 lymph nodes negative  · Had a lengthy discussion with patient that she does not require any chemotherapy given the small size, low-grade nature of the tumor with it being ER NE positive HER-2/berenice negative.  · XRT from 8/5/2019 - 9/3/2019  · 08/17/19 - started Arimidex  · 09/30/19 - Patient reports having gained about 20 pounds since the diagnosis of her cancer.  As a result, she also has arthralgia.  · 7/27/2020 patient complained of worsening arthralgias, particularly in her hips since starting Arimidex.  After discussion with Dr. Morales, we will have her hold Arimidex x1 month.  Then start Femara 2.5 mg daily.  I will see her back in 2 months for reevaluation.  · 11/15/2022 AMANDA per exam today.  Tolerating Femara well.  Continue.    2.  Osteopenia, reviewed bone density from December 2017 and she does have osteopenia.  We encouraged her to take calcium and vitamin D supplements.  · DEXA scan December 2019 normal bone density.  Stable patient taking calcium and vitamin D  · DEXA 5/2022 normal bone density    3.  Osteoarthritis.  Continue Celebrex and Cymbalta.    6.  Hot flashes secondary to AI therapy.  This is somewhat better with letrozole but still present.  Manageable.     7.   Hypothyroidism, stable    8.  Depression on Cymbalta well-controlled    9.  Mild hypercalcemia  · May 2022 patient instructed to drop back from twice a day dosing to once a day dosing of calcium  · 11/15/2022 calcium level still too high today at 10.5.  Patient has been instructed to discontinue calcium just take vitamin D.  She is likely getting enough in her diet.    Plan   1. Continue letrozole 2.5 mg daily.  2. DEXA scan reviewed from May, normal bone density  3. Mammogram reviewed from June 2022, benign.  4. Continue vitamin D supplement only, no further calcium per above.  5. Follow-up in 6 months with Dr. Morales with CBC, CMP.        MARILEE Teixeira          CC:  MD Evelin Cortez MD Crystal McMahan, MD Robert Schweitzer, MD Janet Leon, MD

## 2022-11-15 NOTE — TELEPHONE ENCOUNTER
Call to Ms. Azevedo to let her know that her calcium was elevated again today, and DAMON Anderson, wanted to instruct her to stop taking the calcium supplement, and only take Vitamin D, without the calcium.  Patient verbalized understanding.

## 2023-02-06 RX ORDER — LETROZOLE 2.5 MG/1
TABLET, FILM COATED ORAL
Qty: 90 TABLET | Refills: 1 | Status: SHIPPED | OUTPATIENT
Start: 2023-02-06

## 2023-05-09 ENCOUNTER — OFFICE VISIT (OUTPATIENT)
Dept: ONCOLOGY | Facility: CLINIC | Age: 63
End: 2023-05-09
Payer: MEDICARE

## 2023-05-09 ENCOUNTER — LAB (OUTPATIENT)
Dept: LAB | Facility: HOSPITAL | Age: 63
End: 2023-05-09
Payer: MEDICARE

## 2023-05-09 VITALS
SYSTOLIC BLOOD PRESSURE: 115 MMHG | TEMPERATURE: 98 F | OXYGEN SATURATION: 97 % | DIASTOLIC BLOOD PRESSURE: 72 MMHG | HEIGHT: 62 IN | WEIGHT: 208.4 LBS | BODY MASS INDEX: 38.35 KG/M2 | HEART RATE: 73 BPM | RESPIRATION RATE: 16 BRPM

## 2023-05-09 DIAGNOSIS — C50.311 MALIGNANT NEOPLASM OF LOWER-INNER QUADRANT OF RIGHT BREAST OF FEMALE, ESTROGEN RECEPTOR POSITIVE: ICD-10-CM

## 2023-05-09 DIAGNOSIS — Z17.0 MALIGNANT NEOPLASM OF LOWER-INNER QUADRANT OF RIGHT BREAST OF FEMALE, ESTROGEN RECEPTOR POSITIVE: ICD-10-CM

## 2023-05-09 DIAGNOSIS — Z12.31 SCREENING MAMMOGRAM, ENCOUNTER FOR: Primary | ICD-10-CM

## 2023-05-09 DIAGNOSIS — C50.919 MALIGNANT NEOPLASM OF FEMALE BREAST, UNSPECIFIED ESTROGEN RECEPTOR STATUS, UNSPECIFIED LATERALITY, UNSPECIFIED SITE OF BREAST: ICD-10-CM

## 2023-05-09 DIAGNOSIS — Z79.811 AROMATASE INHIBITOR USE: ICD-10-CM

## 2023-05-09 LAB
ALBUMIN SERPL-MCNC: 4.3 G/DL (ref 3.5–5.2)
ALBUMIN/GLOB SERPL: 1.4 G/DL (ref 1.1–2.4)
ALP SERPL-CCNC: 116 U/L (ref 38–116)
ALT SERPL W P-5'-P-CCNC: 23 U/L (ref 0–33)
ANION GAP SERPL CALCULATED.3IONS-SCNC: 12.2 MMOL/L (ref 5–15)
AST SERPL-CCNC: 30 U/L (ref 0–32)
BASOPHILS # BLD AUTO: 0.03 10*3/MM3 (ref 0–0.2)
BASOPHILS NFR BLD AUTO: 0.5 % (ref 0–1.5)
BILIRUB SERPL-MCNC: 0.5 MG/DL (ref 0.2–1.2)
BUN SERPL-MCNC: 25 MG/DL (ref 6–20)
BUN/CREAT SERPL: 24.5 (ref 7.3–30)
CALCIUM SPEC-SCNC: 10.1 MG/DL (ref 8.5–10.2)
CHLORIDE SERPL-SCNC: 99 MMOL/L (ref 98–107)
CO2 SERPL-SCNC: 28.8 MMOL/L (ref 22–29)
CREAT SERPL-MCNC: 1.02 MG/DL (ref 0.6–1.1)
DEPRECATED RDW RBC AUTO: 47.5 FL (ref 37–54)
EGFRCR SERPLBLD CKD-EPI 2021: 61.2 ML/MIN/1.73
EOSINOPHIL # BLD AUTO: 0.29 10*3/MM3 (ref 0–0.4)
EOSINOPHIL NFR BLD AUTO: 4.5 % (ref 0.3–6.2)
ERYTHROCYTE [DISTWIDTH] IN BLOOD BY AUTOMATED COUNT: 13.7 % (ref 12.3–15.4)
GLOBULIN UR ELPH-MCNC: 3.1 GM/DL (ref 1.8–3.5)
GLUCOSE SERPL-MCNC: 84 MG/DL (ref 74–124)
HCT VFR BLD AUTO: 42.8 % (ref 34–46.6)
HGB BLD-MCNC: 13.5 G/DL (ref 12–15.9)
IMM GRANULOCYTES # BLD AUTO: 0.02 10*3/MM3 (ref 0–0.05)
IMM GRANULOCYTES NFR BLD AUTO: 0.3 % (ref 0–0.5)
LYMPHOCYTES # BLD AUTO: 1.78 10*3/MM3 (ref 0.7–3.1)
LYMPHOCYTES NFR BLD AUTO: 27.7 % (ref 19.6–45.3)
MCH RBC QN AUTO: 29.8 PG (ref 26.6–33)
MCHC RBC AUTO-ENTMCNC: 31.5 G/DL (ref 31.5–35.7)
MCV RBC AUTO: 94.5 FL (ref 79–97)
MONOCYTES # BLD AUTO: 0.63 10*3/MM3 (ref 0.1–0.9)
MONOCYTES NFR BLD AUTO: 9.8 % (ref 5–12)
NEUTROPHILS NFR BLD AUTO: 3.68 10*3/MM3 (ref 1.7–7)
NEUTROPHILS NFR BLD AUTO: 57.2 % (ref 42.7–76)
NRBC BLD AUTO-RTO: 0 /100 WBC (ref 0–0.2)
PLATELET # BLD AUTO: 248 10*3/MM3 (ref 140–450)
PMV BLD AUTO: 10.8 FL (ref 6–12)
POTASSIUM SERPL-SCNC: 4.1 MMOL/L (ref 3.5–4.7)
PROT SERPL-MCNC: 7.4 G/DL (ref 6.3–8)
RBC # BLD AUTO: 4.53 10*6/MM3 (ref 3.77–5.28)
SODIUM SERPL-SCNC: 140 MMOL/L (ref 134–145)
WBC NRBC COR # BLD: 6.43 10*3/MM3 (ref 3.4–10.8)

## 2023-05-09 PROCEDURE — 80053 COMPREHEN METABOLIC PANEL: CPT

## 2023-05-09 PROCEDURE — 85025 COMPLETE CBC W/AUTO DIFF WBC: CPT

## 2023-05-09 PROCEDURE — 36415 COLL VENOUS BLD VENIPUNCTURE: CPT

## 2023-05-09 NOTE — PROGRESS NOTES
Subjective     REASON FOR FOLLOW UP:   T1 a N0 M0 invasive ductal carcinoma of the right breast.  Patient is status post lumpectomy with sentinel lymph node surgery on July 2019.  She is ER positive, MS positive HER-2/berenice negative.  · XRT from 8/5/2019 - 9/3/2019  · 08/17/19 - started Arimidex  · Switched to Femara August 27, 2020 which she is tolerating well                             HISTORY OF PRESENT ILLNESS:  The patient is a 63 y.o. year old female with history of T1 a N0 M0 invasive ductal carcinoma the right breast s/p lumpectomy in July 2019.she was initially treated with Arimidex beginning in August 2019 however was switched to Femara as she could not tolerate Arimidex secondary to arthralgias.    Patient has had much better tolerance with Femara.  As she is reviewed back today she notes generally being very hot whenever she exerts herself.  She does not describe this as a hot flash but again just being very hot and sweaty more than usual when exerting herself.  This has been the case since starting AI therapy.  Otherwise her arthralgias are much better on Femara.  She does continue on Cymbalta and Celebrex with noted underlying osteoarthritis.    Patient underwent routine DEXA scan in May 2022 with normal bone density noted.  She also had annual mammogram in June, benign.    She denies other concerns at this time.      Interval history:    Patient is doing very well with Femara.  She has some hot flashes but no joint pains.  She had her mammogram in June 2022 which was negative.  She will be due June 2023.  She does not have arthralgias.  She is active at home.  She has not lost much weight.    ONCOLOGIC HISTORY  Back in October 2019 she had a mammogram which showed an abnormality in the right breast, which showed a new irregular nodular opacity in the deep medial right breast near the chest wall measuring over 1 cm in size.  She had circumscribed benign appearing nodule in the medial right breast near  the nipple which is also new.  Likely thought to be a cyst.  Diagnostic mammogram was suggested.  Patient then underwent diagnostic mammogram November 7, 2018 and was thought to represent fibroglandular tissue.  Ultrasound showed no abnormality in the medial right breast that was seen on screening mammogram.  However a six-month follow-up suggested.    Repeat diagnostic mammogram Sabra 3, 2019 was abnormal and biopsy confirmed invasive ductal carcinoma, grade 1, ER [place] positive HER-2/berenice negative.  Subsequently patient underwent lumpectomy on July 8, 2019 with sentinel lymph node biopsy.    9/19/2018: Bilateral screening mammogram  IMPRESSION:  1. New irregular nodular opacity in the deep medial right breast.  Suspicious imaging features. Recall for additional diagnostic imaging is  recommended.  2. Circumscribed benign-appearing nodule in the medial right breast near  the nipple is also new. Ultrasound imaging of this nodule is recommended  to evaluate for a probable cyst or similar benign nodule.    November 7, 2018: Right diagnostic mammogram  MAMMOGRAM FINDINGS:  Small focal ill-defined opacity is again noted within the deep medial  right breast along the 3:00 axis. This is visible in retrospect on each  of the prior studies, and may therefore potentially represent focally  prominent normal fibroglandular tissue. Ultrasound imaging was then  Performed.    November 7, 2019: Ultrasound of right breast  IMPRESSION:  1. Probably benign examination.  2. Small ill-defined opacity within the deep medial right breast showing  no corresponding abnormality on directed ultrasound imaging today.  3. Follow-up unilateral mammogram in six months is indicated.    Sabra 3, 2019: Right diagnostic mammogram and right breast ultrasound  MAMMOGRAM FINDINGS:  Small focal irregular opacity is again noted along the 3:00 axis in the  deep portion of the right breast about 9 cm from the nipple measuring  about 8 mm. Linear opacities  surround the lesion in a radial pattern.  The morphology is concerning, particularly on tomogram images today.     ULTRASOUND FINDINGS:  High-resolution grayscale ultrasound imaging directed to this portion of  the breast today does show a subtle focus of acoustic shadowing,  although mass is not clearly delineated.     IMPRESSION:  1.  Small irregular lesion in the deep medial right breast with  suspicious mammographic features, difficult to identify by ultrasound.  2.  Stereotactic core needle biopsy of the lesion is recommended for  further assessment.    June 13, 2019: Stereotactic biopsy of the right breast medial 3 o'clock position lesion shows invasive carcinoma of no special type, ductal, 2.8 mm, grade 1, Hollister score of 3 with associated DCIS with cribriform and micropapillary, low nuclear grade,    ER: %  W[place]: 41-50%  HER-2/berenice: 1+ negative  Ki-67 1%    June 21, 2019: MRI breast bilateral  IMPRESSION:  Small marking clip and postbiopsy changes in the lower inner  quadrant of the right breast as described in more detail above. There is  no MRI evidence of residual neoplasm. There are no suspicious findings  in the left breast.    July 8, 2019: Status post right needle localized partial mastectomy with sentinel lymph node biopsy  Synoptic Checklist    INVASIVE CARCINOMA OF THE BREAST (Breast Invasive - All Specimens)  [facility]  [phone]  CLINICAL  Radiologic Finding Mass or architectural distortion  [facility],  [address] [place] [address]  PATIENT: Emily Azevedo  MRN: [id number] CSN: [id number]  Page: 2 of 4 Printed: 7/10/2019 11:35 AM  Right  .  TUMOR  Tumor Site: Invasive Carcinoma Upper inner quadrant  Clock Position of Tumor Site 3 o'clock  Histologic Type Invasive carcinoma of no special type (ductal, not otherwise  specified)  Histologic Grade (Hollister Histologic Score) No residual invasive carcinoma  Tumor Size Greatest dimension  of largest invasive focus in Millimeters (mm): 2.8  mm Millimeters (mm)  Tumor Focality Single focus of invasive carcinoma  Ductal Carcinoma In Situ (DCIS) Present  Negative for extensive intraductal component (EIC)  Size (Extent) of DCIS Estimated size of DCIS greatest dimension in Millimeters (mm) is at  least: 7 mm Millimeters (mm)  Number of Blocks with DCIS 2  Number of Blocks Examined 27  Architectural Patterns Cribriform  Nuclear Grade Grade I (low)  Necrosis Not identified  Lobular Carcinoma In Situ (LCIS) No LCIS in specimen  Tumor Extent  Accessory Findings  Lymphovascular Invasion Not identified  Dermal Lymphovascular Invasion No skin present  Microcalcifications Not identified  Treatment Effect No known presurgical therapy  .  MARGINS  DCIS Margins Uninvolved by DCIS  Distance of DCIS to Anterior Margin in Millimeters (mm) 1.1 Millimeters (mm)  Distance of DCIS to Posterior Margin in Millimeters (mm) 32 Millimeters (mm)  Distance of DCIS to Superior Margin in Millimeters (mm) 5.5 Millimeters (mm)  Distance of DCIS to Inferior Margin in Millimeters (mm) 5.4 Millimeters (mm)  Distance of DCIS to Medial Margin in Millimeters (mm) 40 Millimeters (mm)  Distance of DCIS to Lateral Margin in Millimeters (mm) 21 Millimeters (mm)  Distance of DCIS from Closest Margin in Millimeters (mm) 1.1 mm Millimeters (mm)  Closest Margin Anterior  .  LYMPH NODES  Regional Lymph Nodes Uninvolved by tumor cells  Number of Lymph Nodes Examined 3  Number of [place] Nodes Examined 3  .  PATHOLOGIC STAGE CLASSIFICATION (pTNM, AJCC 8th Edition)  TNM Descriptors Not applicable  Primary Tumor (Invasive Carcinoma) (pT) pT1a  Regional Lymph Nodes (pN)  Modifier (sn): Only sentinel node(s) evaluated.  Category (pN) pN0  .e to discuss further options of treat  Patient is here to discuss further options of treatment.  She has a T1 a N0 ER AR positive HER-2 negative tumor in the right breast.  She has no family history of breast  cancer.    XRT from 8/5/2019 - 9/3/2019  08/17/19 - started Arimidex    Past Medical History:   Diagnosis Date   • Arthritis    • Carpal tunnel syndrome    • Disease of thyroid gland     Hypothyroidism   • Graves disease    • Hearing loss     earing aids    • History of urinary tract infection    • Hypertension    • Malignant neoplasm of lower-inner quadrant of right breast of female, estrogen receptor positive 6/24/2019   • Palpitations     Dr. Cheng PMD Benign    • Seasonal allergies    • Vertigo         Past Surgical History:   Procedure Laterality Date   • BREAST BIOPSY Right     MALIGNANT   • BREAST BIOPSY Right 2013    BENIGN   • BREAST LUMPECTOMY Right 7/8/2019    Procedure: BREAST LUMPECTOMY WITH SENTINEL NODE BIOPSY AND NEEDLE LOCALIZATION;  Surgeon: Evelin Ayala MD;  Location: [facility];  Service: General   • CARPAL TUNNEL RELEASE Right    • CERVICAL CONE BIOPSY     • CHOLECYSTECTOMY  1983   • COLONOSCOPY     • GALLBLADDER SURGERY     • HYSTERECTOMY  2016   • LAPAROSCOPIC TUBAL LIGATION     • OOPHORECTOMY     • GA TOTAL ABDOMINAL HYSTERECT W/WO RMVL TUBE OVARY Bilateral 7/28/2016    Procedure: TOTAL ABDOMINAL HYSTERECTOMY W/ BSO ;  Surgeon: George Strange MD;  Location: [facility];  Service: Obstetrics/Gynecology   • ROTATOR CUFF REPAIR Right    • TONSILLECTOMY  1963        Current Outpatient Medications on File Prior to Visit   Medication Sig Dispense Refill   • BD Pen Needle Sri U/F 32G X 4 MM misc See Admin Instructions.     • calcium carb-cholecalciferol 600-800 MG-UNIT tablet 2 tab po daily     • celecoxib (CeleBREX) 200 MG capsule Take 1 capsule by mouth Daily. PATIENT TO CONTACT DR. EVELIN AYALA REGARDING HOLDING PRIOR TO SURGERY     • DULoxetine (CYMBALTA) 60 MG capsule Take 1 capsule by mouth Daily. LAST DOSE 7/17/16     • irbesartan-hydrochlorothiazide (AVALIDE) 300-12.5 MG tablet      • letrozole (FEMARA) 2.5 MG tablet TAKE 1 TABLET BY MOUTH EVERY DAY 90 tablet 1    • levothyroxine (SYNTHROID, LEVOTHROID) 100 MCG tablet Take 1 tablet by mouth Daily.     • Saxenda 18 MG/3ML injection pen INJECT 0.6 MG SUBQ DAILY FOR 1 WEEK, INCREASE BY 0.6 MG EVERY WEEK UNTIL REACH 3 MG DAILY.     • [DISCONTINUED] BIOTIN PO Take  by mouth.     • [DISCONTINUED] cetirizine (ZyrTEC) 10 MG tablet Take 10 mg by mouth daily.       No current facility-administered medications on file prior to visit.        ALLERGIES:  No Known Allergies     Social History     Socioeconomic History   • Marital status:      Spouse name: Mendez   • Number of children: 2   • Years of education: High school   Tobacco Use   • Smoking status: Never   • Smokeless tobacco: Never   Vaping Use   • Vaping Use: Never used   Substance and Sexual Activity   • Alcohol use: No     Comment: maybe once a year   • Drug use: No   • Sexual activity: Yes     Partners: Male     Birth control/protection: Surgical     Comment:         Family History   Problem Relation Age of Onset   • Lymphoma Paternal Uncle 30        non hodgkins   • Lymphoma Nephew 16        non hodgkins   • Stroke Mother    • COPD Father    • Breast cancer Neg Hx    • Malig Hyperthermia Neg Hx         Review of Systems   Constitutional: Negative for activity change, appetite change, chills, diaphoresis, fever and unexpected weight change.   HENT: Negative for hearing loss, mouth sores, nosebleeds, sore throat and trouble swallowing.    Respiratory: Negative for cough, chest tightness, shortness of breath and wheezing.    Cardiovascular: Negative for chest pain, palpitations and leg swelling.   Gastrointestinal: Negative for abdominal distention, abdominal pain, constipation, diarrhea, nausea and vomiting.   Endocrine: Positive for heat intolerance (see HPI).   Genitourinary: Negative for difficulty urinating, dysuria, frequency, hematuria and urgency.   Musculoskeletal: Positive for arthralgias (better). Negative for joint swelling.        No muscle  "weakness.   Skin: Negative for rash and wound.   Neurological: Negative for dizziness, seizures, syncope, speech difficulty, weakness, numbness and headaches.   Hematological: Negative for adenopathy. Does not bruise/bleed easily.   Psychiatric/Behavioral: Negative for behavioral problems, confusion, sleep disturbance and suicidal ideas.   All other systems reviewed and are negative.    ROS reviewed and updated 05/09/23    Objective     Vitals:    05/09/23 1533   BP: 115/72   Pulse: 73   Resp: 16   Temp: 98 °F (36.7 °C)   TempSrc: Temporal   SpO2: 97%   Weight: 94.5 kg (208 lb 6.4 oz)   Height: 157.5 cm (62.01\")   PainSc: 0-No pain          5/9/2023     3:29 PM   Current Status   ECOG score 0     Physical examination    CONSTITUTIONAL:  Vital signs reviewed.  No distress, looks comfortable.  RESPIRATORY:  Normal respiratory effort.  Lungs clear to auscultation bilaterally.  CARDIOVASCULAR:  Normal S1, S2.  No murmurs rubs or gallops.  No significant lower extremity edema.  BREAST: Right breast: No skin changes, no evidence of breast mass, no nipple discharge, no evidence of any right axillary adenopathy or right supraclavicular adenopathy  Left breast: No evidence of any skin changes, no evidence of any left breast mass and no evidence of left nipple discharge as well as no left axillary adenopathy or left supraclavicular adenopathy.  GASTROINTESTINAL: Abdomen appears unremarkable.  Nontender.  No hepatomegaly.  No splenomegaly.  LYMPHATIC:  No cervical, supraclavicular, axillary lymphadenopathy.  SKIN:  Warm.  No rashes.  PSYCHIATRIC:  Normal judgment and insight.  Normal mood and affect.  I have reexamined the patient and the results are consistent with the previously documented exam. Reshma Morales MD       RECENT LABS:  Results from last 7 days   Lab Units 05/09/23  1450   WBC 10*3/mm3 6.43   NEUTROS ABS 10*3/mm3 3.68   HEMOGLOBIN g/dL 13.5   HEMATOCRIT % 42.8   PLATELETS 10*3/mm3 248                 Assessment " & Plan     1.  T1 a N0 M0 invasive ductal carcinoma of the right breast.  Patient is status post lumpectomy with sentinel lymph node surgery on July 2019.  She is ER positive, AR positive HER-2/berenice negative.  -September 19, 2018 screening mammogram showed new irregular nodular density in the deep medial right breast which is suspicious and six-month follow-up suggested.  -November 7, 2018, patient had right diagnostic mammogram which showed small ill-defined opacity in the deep medial right breast but ultrasound did not show that and six-month follow-up suggested  -Sabra 3, 2019, small irregular opacity in the 3:00 axis in the upper medial right breast, 8 mm, suspicious  -Biopsy consistent with invasive ductal carcinoma, 2.8 mm, grade 1, [place] score of 3, associated DCIS, micro papillary and cribriform, ER +%, AR +41-50%, HER-2/berenice 1+ negative, Ki-67 1%  -July 2019, status post right lumpectomy with sentinel lymph nodes, 3 o'clock position, no invasive carcinoma identified on the lumpectomy, single focus of invasive carcinoma 2.8 mm seen at biopsy, DCIS present 7 mm, DCIS in 2 of the 27 blocks, no lymphovascular space invasion, margins clear except close anterior margin from the DCIS is 1.1 mm, 3 lymph nodes negative  · Had a lengthy discussion with patient that she does not require any chemotherapy given the small size, low-grade nature of the tumor with it being ER AR positive HER-2/berenice negative.  · XRT from 8/5/2019 - 9/3/2019  · 08/17/19 - started Arimidex  · 09/30/19 - Patient reports having gained about 20 pounds since the diagnosis of her cancer.  As a result, she also has arthralgia.  · 7/27/2020 patient complained of worsening arthralgias, particularly in her hips since starting Arimidex.  After discussion with Dr. Morales, we will have her hold Arimidex x1 month.  Then start Femara 2.5 mg daily.  I will see her back in 2 months for reevaluation.  · 11/15/2022 AMANDA per exam today.  Tolerating  Femara well.  Continue.  · May 2023: Patient is doing well with Femara.  Reviewed the results of mammogram from June 2022 which is negative    2.  Osteopenia, reviewed bone density from December 2017 and she does have osteopenia.  We encouraged her to take calcium and vitamin D supplements.  · DEXA scan December 2019 normal bone density.  Stable patient taking calcium and vitamin D  · DEXA 5/2022 normal bone density    3.  Osteoarthritis.  Continue Celebrex and Cymbalta.    6.  Hot flashes secondary to AI therapy.  This is somewhat better with letrozole but still present.  Manageable.   Patient does not want to take any Effexor    7.  Hypothyroidism, stable    8.  Depression on Cymbalta well-controlled    9.  Mild hypercalcemia  · May 2022 patient instructed to drop back from twice a day dosing to once a day dosing of calcium  · 11/15/2022 calcium level still too high today at 10.5.  Patient has been instructed to discontinue calcium just take vitamin D.  She is likely getting enough in her diet.    Plan   1. Continue letrozole 2.5 mg daily.  2. Continue calcium and vitamin D supplements  3. Offered Effexor for hot flashes but patient refused  4. Reviewed mammogram from June 2022 which is negative  5. We will schedule mammogram June 2023  6. Follow-up with me in 6 months with labs  7. She has 1 more year to complete 5 years of endocrine therapy.    MD Reshma Segundo MD          CC:  MD Evelin Cortez MD Crystal McMahan, MD Robert Schweitzer, MD Janet Leon, MD

## 2023-06-12 ENCOUNTER — HOSPITAL ENCOUNTER (OUTPATIENT)
Dept: MAMMOGRAPHY | Facility: HOSPITAL | Age: 63
Discharge: HOME | End: 2023-06-12
Admitting: INTERNAL MEDICINE
Payer: MEDICARE

## 2023-06-12 DIAGNOSIS — Z12.31 SCREENING MAMMOGRAM, ENCOUNTER FOR: ICD-10-CM

## 2023-06-12 PROCEDURE — 77067 SCR MAMMO BI INCL CAD: CPT

## 2023-06-12 PROCEDURE — 77063 BREAST TOMOSYNTHESIS BI: CPT

## 2023-08-14 RX ORDER — LETROZOLE 2.5 MG/1
TABLET, FILM COATED ORAL
Qty: 90 TABLET | Refills: 1 | Status: SHIPPED | OUTPATIENT
Start: 2023-08-14

## 2023-10-24 ENCOUNTER — OFFICE VISIT (OUTPATIENT)
Dept: ONCOLOGY | Facility: CLINIC | Age: 63
End: 2023-10-24
Payer: MEDICARE

## 2023-10-24 ENCOUNTER — LAB (OUTPATIENT)
Dept: LAB | Facility: HOSPITAL | Age: 63
End: 2023-10-24
Payer: MEDICARE

## 2023-10-24 VITALS
SYSTOLIC BLOOD PRESSURE: 109 MMHG | RESPIRATION RATE: 16 BRPM | WEIGHT: 216.9 LBS | HEART RATE: 70 BPM | TEMPERATURE: 98 F | BODY MASS INDEX: 39.91 KG/M2 | HEIGHT: 62 IN | DIASTOLIC BLOOD PRESSURE: 71 MMHG | OXYGEN SATURATION: 97 %

## 2023-10-24 DIAGNOSIS — Z12.31 SCREENING MAMMOGRAM, ENCOUNTER FOR: ICD-10-CM

## 2023-10-24 DIAGNOSIS — C50.919 MALIGNANT NEOPLASM OF FEMALE BREAST, UNSPECIFIED ESTROGEN RECEPTOR STATUS, UNSPECIFIED LATERALITY, UNSPECIFIED SITE OF BREAST: ICD-10-CM

## 2023-10-24 DIAGNOSIS — C50.919 MALIGNANT NEOPLASM OF FEMALE BREAST, UNSPECIFIED ESTROGEN RECEPTOR STATUS, UNSPECIFIED LATERALITY, UNSPECIFIED SITE OF BREAST: Primary | ICD-10-CM

## 2023-10-24 LAB
ALBUMIN SERPL-MCNC: 4 G/DL (ref 3.5–5.2)
ALBUMIN/GLOB SERPL: 1.3 G/DL
ALP SERPL-CCNC: 114 U/L (ref 39–117)
ALT SERPL W P-5'-P-CCNC: 23 U/L (ref 1–33)
ANION GAP SERPL CALCULATED.3IONS-SCNC: 13 MMOL/L (ref 5–15)
AST SERPL-CCNC: 34 U/L (ref 1–32)
BASOPHILS # BLD AUTO: 0.04 10*3/MM3 (ref 0–0.2)
BASOPHILS NFR BLD AUTO: 0.7 % (ref 0–1.5)
BILIRUB SERPL-MCNC: 0.5 MG/DL (ref 0–1.2)
BUN SERPL-MCNC: 19 MG/DL (ref 8–23)
BUN/CREAT SERPL: 20.7 (ref 7–25)
CALCIUM SPEC-SCNC: 9.7 MG/DL (ref 8.6–10.5)
CHLORIDE SERPL-SCNC: 101 MMOL/L (ref 98–107)
CO2 SERPL-SCNC: 27 MMOL/L (ref 22–29)
CREAT SERPL-MCNC: 0.92 MG/DL (ref 0.6–1.1)
DEPRECATED RDW RBC AUTO: 48 FL (ref 37–54)
EGFRCR SERPLBLD CKD-EPI 2021: 69.2 ML/MIN/1.73
EOSINOPHIL # BLD AUTO: 0.29 10*3/MM3 (ref 0–0.4)
EOSINOPHIL NFR BLD AUTO: 4.7 % (ref 0.3–6.2)
ERYTHROCYTE [DISTWIDTH] IN BLOOD BY AUTOMATED COUNT: 13.5 % (ref 12.3–15.4)
GLOBULIN UR ELPH-MCNC: 3 GM/DL
GLUCOSE SERPL-MCNC: 78 MG/DL (ref 65–99)
HCT VFR BLD AUTO: 44.1 % (ref 34–46.6)
HGB BLD-MCNC: 14.4 G/DL (ref 12–15.9)
IMM GRANULOCYTES # BLD AUTO: 0.02 10*3/MM3 (ref 0–0.05)
IMM GRANULOCYTES NFR BLD AUTO: 0.3 % (ref 0–0.5)
LYMPHOCYTES # BLD AUTO: 1.5 10*3/MM3 (ref 0.7–3.1)
LYMPHOCYTES NFR BLD AUTO: 24.5 % (ref 19.6–45.3)
MCH RBC QN AUTO: 31.4 PG (ref 26.6–33)
MCHC RBC AUTO-ENTMCNC: 32.7 G/DL (ref 31.5–35.7)
MCV RBC AUTO: 96.1 FL (ref 79–97)
MONOCYTES # BLD AUTO: 0.51 10*3/MM3 (ref 0.1–0.9)
MONOCYTES NFR BLD AUTO: 8.3 % (ref 5–12)
NEUTROPHILS NFR BLD AUTO: 3.76 10*3/MM3 (ref 1.7–7)
NEUTROPHILS NFR BLD AUTO: 61.5 % (ref 42.7–76)
NRBC BLD AUTO-RTO: 0 /100 WBC (ref 0–0.2)
PLATELET # BLD AUTO: 210 10*3/MM3 (ref 140–450)
PMV BLD AUTO: 11.1 FL (ref 6–12)
POTASSIUM SERPL-SCNC: 4.4 MMOL/L (ref 3.5–5.2)
PROT SERPL-MCNC: 7 G/DL (ref 6–8.5)
RBC # BLD AUTO: 4.59 10*6/MM3 (ref 3.77–5.28)
SODIUM SERPL-SCNC: 141 MMOL/L (ref 136–145)
WBC NRBC COR # BLD: 6.12 10*3/MM3 (ref 3.4–10.8)

## 2023-10-24 PROCEDURE — 80053 COMPREHEN METABOLIC PANEL: CPT

## 2023-10-24 PROCEDURE — 36415 COLL VENOUS BLD VENIPUNCTURE: CPT

## 2023-10-24 PROCEDURE — 85025 COMPLETE CBC W/AUTO DIFF WBC: CPT

## 2023-10-24 RX ORDER — LEVOTHYROXINE SODIUM 88 UG/1
88 TABLET ORAL DAILY
COMMUNITY
Start: 2023-09-07

## 2023-10-24 NOTE — PROGRESS NOTES
Subjective     REASON FOR FOLLOW UP:   T1 a N0 M0 invasive ductal carcinoma of the right breast.  Patient is status post lumpectomy with sentinel lymph node surgery on July 2019.  She is ER positive, TN positive HER-2/berenice negative.  XRT from 8/5/2019 - 9/3/2019  08/17/19 - started Arimidex  Switched to Femara August 27, 2020 which she is tolerating well                             HISTORY OF PRESENT ILLNESS:  The patient is a 63 y.o. year old female with history of T1 a N0 M0 invasive ductal carcinoma the right breast s/p lumpectomy in July 2019.she was initially treated with Arimidex beginning in August 2019 however was switched to Femara as she could not tolerate Arimidex secondary to arthralgias.    Interval history:    Patient is doing well without much complaints.  She is on Femara currently and tolerating well no hot flashes.  She had a mammogram back in June 2023 and that was negative bone density was done May 2022 which showed that it was normal.  No new medications.        ONCOLOGIC HISTORY  Back in October 2019 she had a mammogram which showed an abnormality in the right breast, which showed a new irregular nodular opacity in the deep medial right breast near the chest wall measuring over 1 cm in size.  She had circumscribed benign appearing nodule in the medial right breast near the nipple which is also new.  Likely thought to be a cyst.  Diagnostic mammogram was suggested.  Patient then underwent diagnostic mammogram November 7, 2018 and was thought to represent fibroglandular tissue.  Ultrasound showed no abnormality in the medial right breast that was seen on screening mammogram.  However a six-month follow-up suggested.    Repeat diagnostic mammogram Sabra 3, 2019 was abnormal and biopsy confirmed invasive ductal carcinoma, grade 1, ER TN positive HER-2/berenice negative.  Subsequently patient underwent lumpectomy on July 8, 2019 with sentinel lymph node biopsy.    9/19/2018: Bilateral screening  mammogram  IMPRESSION:  1. New irregular nodular opacity in the deep medial right breast.  Suspicious imaging features. Recall for additional diagnostic imaging is  recommended.  2. Circumscribed benign-appearing nodule in the medial right breast near  the nipple is also new. Ultrasound imaging of this nodule is recommended  to evaluate for a probable cyst or similar benign nodule.    November 7, 2018: Right diagnostic mammogram  MAMMOGRAM FINDINGS:  Small focal ill-defined opacity is again noted within the deep medial  right breast along the 3:00 axis. This is visible in retrospect on each  of the prior studies, and may therefore potentially represent focally  prominent normal fibroglandular tissue. Ultrasound imaging was then  Performed.    November 7, 2019: Ultrasound of right breast  IMPRESSION:  1. Probably benign examination.  2. Small ill-defined opacity within the deep medial right breast showing  no corresponding abnormality on directed ultrasound imaging today.  3. Follow-up unilateral mammogram in six months is indicated.    Sabra 3, 2019: Right diagnostic mammogram and right breast ultrasound  MAMMOGRAM FINDINGS:  Small focal irregular opacity is again noted along the 3:00 axis in the  deep portion of the right breast about 9 cm from the nipple measuring  about 8 mm. Linear opacities surround the lesion in a radial pattern.  The morphology is concerning, particularly on tomogram images today.     ULTRASOUND FINDINGS:  High-resolution grayscale ultrasound imaging directed to this portion of  the breast today does show a subtle focus of acoustic shadowing,  although mass is not clearly delineated.     IMPRESSION:  1.  Small irregular lesion in the deep medial right breast with  suspicious mammographic features, difficult to identify by ultrasound.  2.  Stereotactic core needle biopsy of the lesion is recommended for  further assessment.    June 13, 2019: Stereotactic biopsy of the right breast medial 3  o'clock position lesion shows invasive carcinoma of no special type, ductal, 2.8 mm, grade 1, Meghan score of 3 with associated DCIS with cribriform and micropapillary, low nuclear grade,    ER: %  SD: 41-50%  HER-2/berenice: 1+ negative  Ki-67 1%    June 21, 2019: MRI breast bilateral  IMPRESSION:  Small marking clip and postbiopsy changes in the lower inner  quadrant of the right breast as described in more detail above. There is  no MRI evidence of residual neoplasm. There are no suspicious findings  in the left breast.    July 8, 2019: Status post right needle localized partial mastectomy with sentinel lymph node biopsy  Synoptic Checklist    INVASIVE CARCINOMA OF THE BREAST (Breast Invasive - All Specimens)  Resulting Labs  [phone]  CLINICAL  Radiologic Finding Mass or architectural distortion  [facility] LABORATORY,  [address] [place] [address]  PATIENT: Emily Azevedo  MRN: [id number] CSN: [id number]  Page: 2 of 4 Printed: 7/10/2019 11:35 AM  Right  .  TUMOR  Tumor Site: Invasive Carcinoma Upper inner quadrant  Clock Position of Tumor Site 3 o'clock  Histologic Type Invasive carcinoma of no special type (ductal, not otherwise  specified)  Histologic Grade (Meghan Histologic Score) No residual invasive carcinoma  Tumor Size Greatest dimension of largest invasive focus in Millimeters (mm): 2.8  mm Millimeters (mm)  Tumor Focality Single focus of invasive carcinoma  Ductal Carcinoma In Situ (DCIS) Present  Negative for extensive intraductal component (EIC)  Size (Extent) of DCIS Estimated size of DCIS greatest dimension in Millimeters (mm) is at  least: 7 mm Millimeters (mm)  Number of Blocks with DCIS 2  Number of Blocks Examined 27  Architectural Patterns Cribriform  Nuclear Grade Grade I (low)  Necrosis Not identified  Lobular Carcinoma In Situ (LCIS) No LCIS in specimen  Tumor Extent  Accessory Findings  Lymphovascular Invasion Not identified  Dermal  Lymphovascular Invasion No skin present  Microcalcifications Not identified  Treatment Effect No known presurgical therapy  .  MARGINS  DCIS Margins Uninvolved by DCIS  Distance of DCIS to Anterior Margin in Millimeters (mm) 1.1 Millimeters (mm)  Distance of DCIS to Posterior Margin in Millimeters (mm) 32 Millimeters (mm)  Distance of DCIS to Superior Margin in Millimeters (mm) 5.5 Millimeters (mm)  Distance of DCIS to Inferior Margin in Millimeters (mm) 5.4 Millimeters (mm)  Distance of DCIS to Medial Margin in Millimeters (mm) 40 Millimeters (mm)  Distance of DCIS to Lateral Margin in Millimeters (mm) 21 Millimeters (mm)  Distance of DCIS from Closest Margin in Millimeters (mm) 1.1 mm Millimeters (mm)  Closest Margin Anterior  .  LYMPH NODES  Regional Lymph Nodes Uninvolved by tumor cells  Number of Lymph Nodes Examined 3  Number of [place] Nodes Examined 3  .  PATHOLOGIC STAGE CLASSIFICATION (pTNM, AJCC 8th Edition)  TNM Descriptors Not applicable  Primary Tumor (Invasive Carcinoma) (pT) pT1a  Regional Lymph Nodes (pN)  Modifier (sn): Only sentinel node(s) evaluated.  Category (pN) pN0  .e to discuss further options of treat  Patient is here to discuss further options of treatment.  She has a T1 a N0 ER NV positive HER-2 negative tumor in the right breast.  She has no family history of breast cancer.    XRT from 8/5/2019 - 9/3/2019  08/17/19 - started Arimidex    Past Medical History:   Diagnosis Date    Arthritis     Carpal tunnel syndrome     Disease of thyroid gland     Hypothyroidism    Graves disease     Hearing loss     earing aids     History of urinary tract infection     Hypertension     Malignant neoplasm of lower-inner quadrant of right breast of female, estrogen receptor positive 6/24/2019    Palpitations     Dr. Cheng PMD Benign     Seasonal allergies     Vertigo         Past Surgical History:   Procedure Laterality Date    BREAST BIOPSY Right     MALIGNANT    BREAST BIOPSY Right 2013    BENIGN     BREAST LUMPECTOMY Right 7/8/2019    Procedure: BREAST LUMPECTOMY WITH SENTINEL NODE BIOPSY AND NEEDLE LOCALIZATION;  Surgeon: Evelin Ayala MD;  [facility] [facility] [place] [facility] [place]  Service: General    CARPAL TUNNEL RELEASE Right     CERVICAL CONE BIOPSY      CHOLECYSTECTOMY  1983    COLONOSCOPY      GALLBLADDER SURGERY      HYSTERECTOMY  2016    LAPAROSCOPIC TUBAL LIGATION      OOPHORECTOMY      AK TOTAL ABDOMINAL HYSTERECT W/WO RMVL TUBE OVARY Bilateral 7/28/2016    Procedure: TOTAL ABDOMINAL HYSTERECTOMY W/ BSO ;  Surgeon: George Strange MD;  Location: [facility] OR;  Service: Obstetrics/Gynecology    ROTATOR CUFF REPAIR Right     TONSILLECTOMY  1963        Current Outpatient Medications on File Prior to Visit   Medication Sig Dispense Refill    calcium carb-cholecalciferol 600-800 MG-UNIT tablet 2 tab po daily      celecoxib (CeleBREX) 200 MG capsule Take 1 capsule by mouth Daily. PATIENT TO CONTACT DR. EVELIN AYALA REGARDING HOLDING PRIOR TO SURGERY      DULoxetine (CYMBALTA) 60 MG capsule Take 1 capsule by mouth Daily. LAST DOSE 7/17/16      irbesartan-hydrochlorothiazide (AVALIDE) 300-12.5 MG tablet       letrozole (FEMARA) 2.5 MG tablet TAKE 1 TABLET BY MOUTH EVERY DAY 90 tablet 1    levothyroxine (SYNTHROID, LEVOTHROID) 88 MCG tablet Take 1 tablet by mouth Daily.      [DISCONTINUED] BD Pen Needle Sri U/F 32G X 4 MM misc See Admin Instructions.      [DISCONTINUED] levothyroxine (SYNTHROID, LEVOTHROID) 100 MCG tablet Take 1 tablet by mouth Daily.      [DISCONTINUED] Saxenda 18 MG/3ML injection pen INJECT 0.6 MG SUBQ DAILY FOR 1 WEEK, INCREASE BY 0.6 MG EVERY WEEK UNTIL REACH 3 MG DAILY.       No current facility-administered medications on file prior to visit.        ALLERGIES:  No Known Allergies     Social History     Socioeconomic History    Marital status:      Spouse name: Mendez    Number of children: 2    Years of education: High school   Tobacco Use    Smoking status: Never     "Smokeless tobacco: Never   Vaping Use    Vaping Use: Never used   Substance and Sexual Activity    Alcohol use: No     Comment: maybe once a year    Drug use: No    Sexual activity: Yes     Partners: Male     Birth control/protection: Surgical     Comment:         Family History   Problem Relation Age of Onset    Lymphoma Paternal Uncle 30        non hodgkins    Lymphoma Nephew 16        non hodgkins    Stroke Mother     COPD Father     Breast cancer Neg Hx     Malig Hyperthermia Neg Hx         Review of Systems   Constitutional:  Negative for activity change, appetite change, chills, diaphoresis, fever and unexpected weight change.   HENT:  Negative for hearing loss, mouth sores, nosebleeds, sore throat and trouble swallowing.    Respiratory:  Negative for cough, chest tightness, shortness of breath and wheezing.    Cardiovascular:  Negative for chest pain, palpitations and leg swelling.   Gastrointestinal:  Negative for abdominal distention, abdominal pain, constipation, diarrhea, nausea and vomiting.   Endocrine: Positive for heat intolerance (see HPI).   Genitourinary:  Negative for difficulty urinating, dysuria, frequency, hematuria and urgency.   Musculoskeletal:  Positive for arthralgias (better). Negative for joint swelling.        No muscle weakness.   Skin:  Negative for rash and wound.   Neurological:  Negative for dizziness, seizures, syncope, speech difficulty, weakness, numbness and headaches.   Hematological:  Negative for adenopathy. Does not bruise/bleed easily.   Psychiatric/Behavioral:  Negative for behavioral problems, confusion, sleep disturbance and suicidal ideas.    All other systems reviewed and are negative.    ROS reviewed and updated 10/24/23    Objective     Vitals:    10/24/23 1132   BP: 109/71   Pulse: 70   Resp: 16   Temp: 98 °F (36.7 °C)   TempSrc: Temporal   SpO2: 97%   Weight: 98.4 kg (216 lb 14.4 oz)   Height: 157.5 cm (62.01\")   PainSc: 0-No pain            10/24/2023    " 11:14 AM   Current Status   ECOG score 0     Physical examination    CONSTITUTIONAL:  Vital signs reviewed.  No distress, looks comfortable.  RESPIRATORY:  Normal respiratory effort.  Lungs clear to auscultation bilaterally.  CARDIOVASCULAR:  Normal S1, S2.  No murmurs rubs or gallops.  No significant lower extremity edema.  BREAST: Right breast: No skin changes, no evidence of breast mass, no nipple discharge, no evidence of any right axillary adenopathy or right supraclavicular adenopathy  Left breast: No evidence of any skin changes, no evidence of any left breast mass and no evidence of left nipple discharge as well as no left axillary adenopathy or left supraclavicular adenopathy.  GASTROINTESTINAL: Abdomen appears unremarkable.  Nontender.  No hepatomegaly.  No splenomegaly.  LYMPHATIC:  No cervical, supraclavicular, axillary lymphadenopathy.  SKIN:  Warm.  No rashes.  PSYCHIATRIC:  Normal judgment and insight.  Normal mood and affect.  I have reexamined the patient and the results are consistent with the previously documented exam. Reshma Morales MD       RECENT LABS:  Results from last 7 days   Lab Units 10/24/23  1057   WBC 10*3/mm3 6.12   NEUTROS ABS 10*3/mm3 3.76   HEMOGLOBIN g/dL 14.4   HEMATOCRIT % 44.1   PLATELETS 10*3/mm3 210     Results from last 7 days   Lab Units 10/24/23  1057   SODIUM mmol/L 141   POTASSIUM mmol/L 4.4   CHLORIDE mmol/L 101   CO2 mmol/L 27.0   BUN mg/dL 19   CREATININE mg/dL 0.92   CALCIUM mg/dL 9.7   ALBUMIN g/dL 4.0   BILIRUBIN mg/dL 0.5   ALK PHOS U/L 114   ALT (SGPT) U/L 23   AST (SGOT) U/L 34*   GLUCOSE mg/dL 78             Assessment & Plan     1.  T1 a N0 M0 invasive ductal carcinoma of the right breast.  Patient is status post lumpectomy with sentinel lymph node surgery on July 2019.  She is ER positive, GA positive HER-2/berenice negative.  -September 19, 2018 screening mammogram showed new irregular nodular density in the deep medial right breast which is suspicious and  six-month follow-up suggested.  -November 7, 2018, patient had right diagnostic mammogram which showed small ill-defined opacity in the deep medial right breast but ultrasound did not show that and six-month follow-up suggested  -Sabra 3, 2019, small irregular opacity in the 3:00 axis in the upper medial right breast, 8 mm, suspicious  -Biopsy consistent with invasive ductal carcinoma, 2.8 mm, grade 1, [place] score of 3, associated DCIS, micro papillary and cribriform, ER +%, MT +41-50%, HER-2/berenice 1+ negative, Ki-67 1%  -July 2019, status post right lumpectomy with sentinel lymph nodes, 3 o'clock position, no invasive carcinoma identified on the lumpectomy, single focus of invasive carcinoma 2.8 mm seen at biopsy, DCIS present 7 mm, DCIS in 2 of the 27 blocks, no lymphovascular space invasion, margins clear except close anterior margin from the DCIS is 1.1 mm, 3 lymph nodes negative  Had a lengthy discussion with patient that she does not require any chemotherapy given the small size, low-grade nature of the tumor with it being ER MT positive HER-2/berenice negative.  XRT from 8/5/2019 - 9/3/2019  08/17/19 - started Arimidex  09/30/19 - Patient reports having gained about 20 pounds since the diagnosis of her cancer.  As a result, she also has arthralgia.  7/27/2020 patient complained of worsening arthralgias, particularly in her hips since starting Arimidex.  After discussion with Dr. Morales, we will have her hold Arimidex x1 month.  Then start Femara 2.5 mg daily.  I will see her back in 2 months for reevaluation.  11/15/2022 AMANDA per exam today.  Tolerating Femara well.  Continue.  May 2023: Patient is doing well with Femara.  Reviewed the results of mammogram from June 2022 which is negative  October 23, 2023: Patient is on Femara and tolerating well reviewed the mammogram from June 2023 and is negative.    2.  Osteopenia, reviewed bone density from December 2017 and she does have osteopenia.  We encouraged  her to take calcium and vitamin D supplements.  DEXA scan December 2019 normal bone density.  Stable patient taking calcium and vitamin D  DEXA 5/2022 normal bone density    3.  Osteoarthritis.  Continue Celebrex and Cymbalta.    6.  Hot flashes secondary to AI therapy.  This is somewhat better with letrozole but still present.  Manageable.   Patient does not want to take any Effexor    7.  Hypothyroidism, stable    8.  Depression on Cymbalta well-controlled    9.  Mild hypercalcemia  May 2022 patient instructed to drop back from twice a day dosing to once a day dosing of calcium  11/15/2022 calcium level still too high today at 10.5.  Patient has been instructed to discontinue calcium just take vitamin D.  She is likely getting enough in her diet.    Plan   Continue letrozole 2.5 mg daily.  Continue calcium and vitamin D supplements  Offered Effexor for hot flashes but patient refused.  Still continues with hot flashes  Reviewed mammogram from June 2022 which is negative  Following 5 years of treatment with letrozole we will release her from here.  Currently she is 4 years out.    Reshma Morales MD                  CC:  MD Evelin Cortez MD Crystal McMahan, MD Robert Schweitzer, MD Janet Leon, MD

## 2023-12-07 RX ORDER — LETROZOLE 2.5 MG/1
2.5 TABLET, FILM COATED ORAL DAILY
Qty: 90 TABLET | Refills: 1 | Status: SHIPPED | OUTPATIENT
Start: 2023-12-07

## 2023-12-07 NOTE — TELEPHONE ENCOUNTER
Caller: Emily Azevedo    Relationship: Self    Best call back number: [phone]     Requested Prescriptions:   Requested Prescriptions     Pending Prescriptions Disp Refills    letrozole (FEMARA) 2.5 MG tablet 90 tablet 1     Sig: Take 1 tablet by mouth Daily.        Pharmacy where request should be sent: [facility] [place] [address] [phone] [facility] [phone] FX     Last office visit with prescribing clinician: 10/24/2023   Last telemedicine visit with prescribing clinician: Visit date not found   Next office visit with prescribing clinician: 4/30/2024     Additional details provided by patient: PT IS OUT    Does the patient have less than a 3 day supply:  [x] Yes  [] No    Would you like a call back once the refill request has been completed: [] Yes [x] No    If the office needs to give you a call back, can they leave a voicemail: [] Yes [x] No    Linda Denny Rep   12/07/23 12:50 EST

## 2024-02-01 ENCOUNTER — OFFICE VISIT (OUTPATIENT)
Dept: OBSTETRICS AND GYNECOLOGY | Age: 64
End: 2024-02-01
Payer: MEDICARE

## 2024-02-01 VITALS
BODY MASS INDEX: 41.26 KG/M2 | WEIGHT: 224.2 LBS | HEIGHT: 62 IN | SYSTOLIC BLOOD PRESSURE: 112 MMHG | DIASTOLIC BLOOD PRESSURE: 74 MMHG

## 2024-02-01 DIAGNOSIS — N75.0 BARTHOLIN'S CYST: ICD-10-CM

## 2024-02-01 DIAGNOSIS — N89.8 VAGINAL DISCHARGE: ICD-10-CM

## 2024-02-01 DIAGNOSIS — Z01.419 WELL WOMAN EXAM WITH ROUTINE GYNECOLOGICAL EXAM: Primary | ICD-10-CM

## 2024-02-01 DIAGNOSIS — Z12.4 SCREENING FOR MALIGNANT NEOPLASM OF CERVIX: ICD-10-CM

## 2024-02-01 DIAGNOSIS — Z11.51 SCREENING FOR HUMAN PAPILLOMAVIRUS (HPV): ICD-10-CM

## 2024-02-01 NOTE — PROGRESS NOTES
Subjective     History of Present Illness    Chief Complaint   Patient presents with    Gynecologic Exam     AE Today, Last AE 2022 (-), HPV (-), MG 2023, Dexa 2022. Pt c/o cyst like bump on vagina lip       Emily Azevedo is a 63 y.o. female who presents for annual exam.  C/o cyst on left side of vagina x 1 week.  Describes it as marble size, with some pain when rubbed against her underwear.  Reports occasional vaginal discharge.  Denies vaginal odor, vaginal itching or urinary symptoms.  Postmenopausal - no vaginal bleeding. Hx hysterectomy. PAP in  was normal/negative, pt requests PAP done today.   Right breast cancer dx . S/p lumpectomy and radiation. She is taking letrozole, until August of this year. Follows with Oncology every 6 months. Oncology manages Mammograms.   Colonoscopy - normal , f/u 10 years  DEXA - normal   Follows with PCP - get wellness labs      Obstetric History:  OB History          2    Para   2    Term   2            AB        Living   2         SAB        IAB        Ectopic        Molar        Multiple        Live Births   2               Menstrual History:     No LMP recorded (lmp unknown). Patient has had a hysterectomy.           Current contraception: post menopausal status  History of abnormal Pap smear: no  Received Gardasil immunization: no  Perform regular self breast exam: yes -    Family history of uterine or ovarian cancer: no  Family History of colon cancer: no  Family history of breast cancer: no    Mammogram: up to date.  Colonoscopy: up to date.  DEXA: up to date.    Exercise: moderately active  Calcium/Vitamin D: uses supplements    The following portions of the patient's history were reviewed and updated as appropriate: allergies, current medications, past family history, past medical history, past social history, past surgical history, and problem list.    Review of Systems  A comprehensive review of systems was negative except  "for: Genitourinary: positive for vaginal discharge and left vagina cyst       Objective   Physical Exam    /74   Ht 157.5 cm (62.01\")   Wt 102 kg (224 lb 3.2 oz)   LMP  (LMP Unknown)   BMI 40.99 kg/m²      General: alert, appears stated age, cooperative, and no distress   Heart: regular rate and rhythm, S1, S2 normal, no murmur, click, rub or gallop   Lungs: clear to auscultation bilaterally   Abdomen: soft, non-tender, without masses or organomegaly   Breast: inspection negative, no nipple discharge or bleeding, no masses or nodularity palpable   External genitalia/Vulva: Bartholin cyst on left labia about 0.5cm in size, moderate atrophy, External genitalia including bartholin's glands, Urethra, [place]'s gland and urethra meatus are normal, and Bladder appears normal without significant prolapse    Vagina: normal mucosa, normal discharge   Cervix: absent    Uterus: absent    Adnexa: normal adnexa   Neurologic: Alert and Oriented x3   Psychiatric: Normal affect, judgement, and mood       Assessment   Diagnoses and all orders for this visit:    1. Well woman exam with routine gynecological exam (Primary)  -     IGP, Apt HPV,rfx 16 / 18,45    2. Bartholin's cyst    3. Vaginal discharge  -     NuSwab VG+ - Swab, Vagina    4. Screening for human papillomavirus (HPV)  -     IGP, Apt HPV,rfx 16 / 18,45    5. Screening for malignant neoplasm of cervix  -     IGP, Apt HPV,rfx 16 / 18,45            Plan   -PAP done today, will call pt with results  -Nuswab VG+ done today, will call patient with results and treat accordingly  -Bartholin Cyst observed on today's exam.  Advised patient to try hot compresses and sitz bath for relief, do not pick at the cyst or try to pop it.  Pamphlet about Bartholin cyst was given to patient.  Informed patient to contact if symptoms fail to improve or worsen.     As part of wellness and prevention, the following topics were discussed with the patient:  Encouraged self breast " exam  Physical activity and regular exercised encouraged.   Nutrition discussed.  Encouraged vitamin D 600IU supplement and 1200mg calcium supplement over the counter to prevent osteoporosis.    All questions answered.   Advised patient to contact me if any questions or concerns arise before their next annual appointment.  Return in about 2 years (around 2/1/2026) for medicare annual exam .

## 2024-02-05 LAB
A VAGINAE DNA VAG QL NAA+PROBE: NORMAL SCORE
BVAB2 DNA VAG QL NAA+PROBE: NORMAL SCORE
C ALBICANS DNA VAG QL NAA+PROBE: NEGATIVE
C GLABRATA DNA VAG QL NAA+PROBE: NEGATIVE
C TRACH DNA VAG QL NAA+PROBE: NEGATIVE
CYTOLOGIST CVX/VAG CYTO: NORMAL
CYTOLOGY CVX/VAG DOC CYTO: NORMAL
CYTOLOGY CVX/VAG DOC THIN PREP: NORMAL
DX ICD CODE: NORMAL
HIV 1 & 2 AB SER-IMP: NORMAL
HPV I/H RISK 4 DNA CVX QL PROBE+SIG AMP: NEGATIVE
MEGA1 DNA VAG QL NAA+PROBE: NORMAL SCORE
N GONORRHOEA DNA VAG QL NAA+PROBE: NEGATIVE
OTHER STN SPEC: NORMAL
STAT OF ADQ CVX/VAG CYTO-IMP: NORMAL
T VAGINALIS DNA VAG QL NAA+PROBE: NEGATIVE

## 2024-02-14 ENCOUNTER — TELEPHONE (OUTPATIENT)
Dept: OBSTETRICS AND GYNECOLOGY | Age: 64
End: 2024-02-14
Payer: MEDICARE

## 2024-04-30 ENCOUNTER — LAB (OUTPATIENT)
Dept: LAB | Facility: HOSPITAL | Age: 64
End: 2024-04-30
Payer: MEDICARE

## 2024-04-30 ENCOUNTER — OFFICE VISIT (OUTPATIENT)
Dept: ONCOLOGY | Facility: CLINIC | Age: 64
End: 2024-04-30
Payer: MEDICARE

## 2024-04-30 VITALS
HEIGHT: 62 IN | BODY MASS INDEX: 41.2 KG/M2 | OXYGEN SATURATION: 96 % | HEART RATE: 79 BPM | DIASTOLIC BLOOD PRESSURE: 79 MMHG | SYSTOLIC BLOOD PRESSURE: 113 MMHG | WEIGHT: 223.9 LBS | TEMPERATURE: 97 F | RESPIRATION RATE: 16 BRPM

## 2024-04-30 DIAGNOSIS — C50.919 MALIGNANT NEOPLASM OF FEMALE BREAST, UNSPECIFIED ESTROGEN RECEPTOR STATUS, UNSPECIFIED LATERALITY, UNSPECIFIED SITE OF BREAST: ICD-10-CM

## 2024-04-30 DIAGNOSIS — Z17.0 MALIGNANT NEOPLASM OF LOWER-INNER QUADRANT OF RIGHT BREAST OF FEMALE, ESTROGEN RECEPTOR POSITIVE: Primary | ICD-10-CM

## 2024-04-30 DIAGNOSIS — C50.311 MALIGNANT NEOPLASM OF LOWER-INNER QUADRANT OF RIGHT BREAST OF FEMALE, ESTROGEN RECEPTOR POSITIVE: Primary | ICD-10-CM

## 2024-04-30 LAB
ALBUMIN SERPL-MCNC: 3.9 G/DL (ref 3.5–5.2)
ALBUMIN/GLOB SERPL: 1.3 G/DL
ALP SERPL-CCNC: 89 U/L (ref 39–117)
ALT SERPL W P-5'-P-CCNC: 34 U/L (ref 1–33)
ANION GAP SERPL CALCULATED.3IONS-SCNC: 12.9 MMOL/L (ref 5–15)
AST SERPL-CCNC: 44 U/L (ref 1–32)
BASOPHILS # BLD AUTO: 0.03 10*3/MM3 (ref 0–0.2)
BASOPHILS NFR BLD AUTO: 0.4 % (ref 0–1.5)
BILIRUB SERPL-MCNC: 0.5 MG/DL (ref 0–1.2)
BUN SERPL-MCNC: 17 MG/DL (ref 8–23)
BUN/CREAT SERPL: 18.5 (ref 7–25)
CALCIUM SPEC-SCNC: 9.7 MG/DL (ref 8.6–10.5)
CHLORIDE SERPL-SCNC: 101 MMOL/L (ref 98–107)
CO2 SERPL-SCNC: 28.1 MMOL/L (ref 22–29)
CREAT SERPL-MCNC: 0.92 MG/DL (ref 0.57–1)
DEPRECATED RDW RBC AUTO: 45.5 FL (ref 37–54)
EGFRCR SERPLBLD CKD-EPI 2021: 69.2 ML/MIN/1.73
EOSINOPHIL # BLD AUTO: 0.35 10*3/MM3 (ref 0–0.4)
EOSINOPHIL NFR BLD AUTO: 4.6 % (ref 0.3–6.2)
ERYTHROCYTE [DISTWIDTH] IN BLOOD BY AUTOMATED COUNT: 13.3 % (ref 12.3–15.4)
GLOBULIN UR ELPH-MCNC: 2.9 GM/DL
GLUCOSE SERPL-MCNC: 123 MG/DL (ref 65–99)
HCT VFR BLD AUTO: 40.9 % (ref 34–46.6)
HGB BLD-MCNC: 13.6 G/DL (ref 12–15.9)
IMM GRANULOCYTES # BLD AUTO: 0.01 10*3/MM3 (ref 0–0.05)
IMM GRANULOCYTES NFR BLD AUTO: 0.1 % (ref 0–0.5)
LYMPHOCYTES # BLD AUTO: 1.94 10*3/MM3 (ref 0.7–3.1)
LYMPHOCYTES NFR BLD AUTO: 25.7 % (ref 19.6–45.3)
MCH RBC QN AUTO: 31.1 PG (ref 26.6–33)
MCHC RBC AUTO-ENTMCNC: 33.3 G/DL (ref 31.5–35.7)
MCV RBC AUTO: 93.6 FL (ref 79–97)
MONOCYTES # BLD AUTO: 0.5 10*3/MM3 (ref 0.1–0.9)
MONOCYTES NFR BLD AUTO: 6.6 % (ref 5–12)
NEUTROPHILS NFR BLD AUTO: 4.72 10*3/MM3 (ref 1.7–7)
NEUTROPHILS NFR BLD AUTO: 62.6 % (ref 42.7–76)
NRBC BLD AUTO-RTO: 0 /100 WBC (ref 0–0.2)
PLATELET # BLD AUTO: 202 10*3/MM3 (ref 140–450)
PMV BLD AUTO: 10.5 FL (ref 6–12)
POTASSIUM SERPL-SCNC: 3.5 MMOL/L (ref 3.5–5.2)
PROT SERPL-MCNC: 6.8 G/DL (ref 6–8.5)
RBC # BLD AUTO: 4.37 10*6/MM3 (ref 3.77–5.28)
SODIUM SERPL-SCNC: 142 MMOL/L (ref 136–145)
WBC NRBC COR # BLD AUTO: 7.55 10*3/MM3 (ref 3.4–10.8)

## 2024-04-30 PROCEDURE — 80053 COMPREHEN METABOLIC PANEL: CPT

## 2024-04-30 PROCEDURE — 1126F AMNT PAIN NOTED NONE PRSNT: CPT | Performed by: INTERNAL MEDICINE

## 2024-04-30 PROCEDURE — 99213 OFFICE O/P EST LOW 20 MIN: CPT | Performed by: INTERNAL MEDICINE

## 2024-04-30 PROCEDURE — 36415 COLL VENOUS BLD VENIPUNCTURE: CPT

## 2024-04-30 PROCEDURE — 1159F MED LIST DOCD IN RCRD: CPT | Performed by: INTERNAL MEDICINE

## 2024-04-30 PROCEDURE — 1160F RVW MEDS BY RX/DR IN RCRD: CPT | Performed by: INTERNAL MEDICINE

## 2024-04-30 PROCEDURE — 85025 COMPLETE CBC W/AUTO DIFF WBC: CPT

## 2024-04-30 NOTE — PROGRESS NOTES
Subjective     REASON FOR FOLLOW UP:   T1 a N0 M0 invasive ductal carcinoma of the right breast.  Patient is status post lumpectomy with sentinel lymph node surgery on July 2019.  She is ER positive, MA positive HER-2/berenice negative.  XRT from 8/5/2019 - 9/3/2019  08/17/19 - started Arimidex  Switched to Femara August 27, 2020 which she is tolerating well                             HISTORY OF PRESENT ILLNESS:  The patient is a 63 y.o. year old female with history of T1 a N0 M0 invasive ductal carcinoma the right breast s/p lumpectomy in July 2019.she was initially treated with Arimidex beginning in August 2019 however was switched to Femara as she could not tolerate Arimidex secondary to arthralgias.    Interval history:    Patient is doing well without much complaints.  She is on Femara currently and tolerating well no hot flashes.  She had a mammogram back in June 2023 and that was negative bone density was done May 2022 which showed that it was normal.  No new medications.    April 30th 2024: Patient currently doing well without any complaints.  Patient screening mammogram from June 12, 2023 is negative.  She is due in June 2024 which she will have her primary care physician order.  Since she is 5 years out we will release her from our office    ONCOLOGIC HISTORY  Back in October 2019 she had a mammogram which showed an abnormality in the right breast, which showed a new irregular nodular opacity in the deep medial right breast near the chest wall measuring over 1 cm in size.  She had circumscribed benign appearing nodule in the medial right breast near the nipple which is also new.  Likely thought to be a cyst.  Diagnostic mammogram was suggested.  Patient then underwent diagnostic mammogram November 7, 2018 and was thought to represent fibroglandular tissue.  Ultrasound showed no abnormality in the medial right breast that was seen on screening mammogram.  However a six-month follow-up suggested.    Repeat  diagnostic mammogram Sabra 3, 2019 was abnormal and biopsy confirmed invasive ductal carcinoma, grade 1, ER IA positive HER-2/berenice negative.  Subsequently patient underwent lumpectomy on July 8, 2019 with sentinel lymph node biopsy.    9/19/2018: Bilateral screening mammogram  IMPRESSION:  1. New irregular nodular opacity in the deep medial right breast.  Suspicious imaging features. Recall for additional diagnostic imaging is  recommended.  2. Circumscribed benign-appearing nodule in the medial right breast near  the nipple is also new. Ultrasound imaging of this nodule is recommended  to evaluate for a probable cyst or similar benign nodule.    November 7, 2018: Right diagnostic mammogram  MAMMOGRAM FINDINGS:  Small focal ill-defined opacity is again noted within the deep medial  right breast along the 3:00 axis. This is visible in retrospect on each  of the prior studies, and may therefore potentially represent focally  prominent normal fibroglandular tissue. Ultrasound imaging was then  Performed.    November 7, 2019: Ultrasound of right breast  IMPRESSION:  1. Probably benign examination.  2. Small ill-defined opacity within the deep medial right breast showing  no corresponding abnormality on directed ultrasound imaging today.  3. Follow-up unilateral mammogram in six months is indicated.    Sabra 3, 2019: Right diagnostic mammogram and right breast ultrasound  MAMMOGRAM FINDINGS:  Small focal irregular opacity is again noted along the 3:00 axis in the  deep portion of the right breast about 9 cm from the nipple measuring  about 8 mm. Linear opacities surround the lesion in a radial pattern.  The morphology is concerning, particularly on tomogram images today.     ULTRASOUND FINDINGS:  High-resolution grayscale ultrasound imaging directed to this portion of  the breast today does show a subtle focus of acoustic shadowing,  although mass is not clearly delineated.     IMPRESSION:  1.  Small irregular lesion in the  deep medial right breast with  suspicious mammographic features, difficult to identify by ultrasound.  2.  Stereotactic core needle biopsy of the lesion is recommended for  further assessment.    June 13, 2019: Stereotactic biopsy of the right breast medial 3 o'clock position lesion shows invasive carcinoma of no special type, ductal, 2.8 mm, grade 1, Meghan score of 3 with associated DCIS with cribriform and micropapillary, low nuclear grade,    ER: %  SC: 41-50%  HER-2/berenice: 1+ negative  Ki-67 1%    June 21, 2019: MRI breast bilateral  IMPRESSION:  Small marking clip and postbiopsy changes in the lower inner  quadrant of the right breast as described in more detail above. There is  no MRI evidence of residual neoplasm. There are no suspicious findings  in the left breast.    July 8, 2019: Status post right needle localized partial mastectomy with sentinel lymph node biopsy  Synoptic Checklist    INVASIVE CARCINOMA OF THE BREAST (Breast Invasive - All Specimens)  Resulting Labs  [phone]  CLINICAL  Radiologic Finding Mass or architectural distortion  [facility],  [address] [place] [address]  PATIENT: Emily Azevedo  MRN: [id number] CSN: [id number]  Page: 2 of 4 Printed: 7/10/2019 11:35 AM  Right  .  TUMOR  Tumor Site: Invasive Carcinoma Upper inner quadrant  Clock Position of Tumor Site 3 o'clock  Histologic Type Invasive carcinoma of no special type (ductal, not otherwise  specified)  Histologic Grade (Meghan Histologic Score) No residual invasive carcinoma  Tumor Size Greatest dimension of largest invasive focus in Millimeters (mm): 2.8  mm Millimeters (mm)  Tumor Focality Single focus of invasive carcinoma  Ductal Carcinoma In Situ (DCIS) Present  Negative for extensive intraductal component (EIC)  Size (Extent) of DCIS Estimated size of DCIS greatest dimension in Millimeters (mm) is at  least: 7 mm Millimeters (mm)  Number of Blocks with DCIS  2  Number of Blocks Examined 27  Architectural Patterns Cribriform  Nuclear Grade Grade I (low)  Necrosis Not identified  Lobular Carcinoma In Situ (LCIS) No LCIS in specimen  Tumor Extent  Accessory Findings  Lymphovascular Invasion Not identified  Dermal Lymphovascular Invasion No skin present  Microcalcifications Not identified  Treatment Effect No known presurgical therapy  .  MARGINS  DCIS Margins Uninvolved by DCIS  Distance of DCIS to Anterior Margin in Millimeters (mm) 1.1 Millimeters (mm)  Distance of DCIS to Posterior Margin in Millimeters (mm) 32 Millimeters (mm)  Distance of DCIS to Superior Margin in Millimeters (mm) 5.5 Millimeters (mm)  Distance of DCIS to Inferior Margin in Millimeters (mm) 5.4 Millimeters (mm)  Distance of DCIS to Medial Margin in Millimeters (mm) 40 Millimeters (mm)  Distance of DCIS to Lateral Margin in Millimeters (mm) 21 Millimeters (mm)  Distance of DCIS from Closest Margin in Millimeters (mm) 1.1 mm Millimeters (mm)  Closest Margin Anterior  .  LYMPH NODES  Regional Lymph Nodes Uninvolved by tumor cells  Number of Lymph Nodes Examined 3  Number of [place] Nodes Examined 3  .  PATHOLOGIC STAGE CLASSIFICATION (pTNM, AJCC 8th Edition)  TNM Descriptors Not applicable  Primary Tumor (Invasive Carcinoma) (pT) pT1a  Regional Lymph Nodes (pN)  Modifier (sn): Only sentinel node(s) evaluated.  Category (pN) pN0  .e to discuss further options of treat  Patient is here to discuss further options of treatment.  She has a T1 a N0 ER AK positive HER-2 negative tumor in the right breast.  She has no family history of breast cancer.    XRT from 8/5/2019 - 9/3/2019  08/17/19 - started Arimidex    Past Medical History:   Diagnosis Date    Arthritis     Carpal tunnel syndrome     Disease of thyroid gland     Hypothyroidism    Graves disease     Hearing loss     earing aids     History of urinary tract infection     Hypertension     Malignant neoplasm of lower-inner quadrant of right breast of  female, estrogen receptor positive 6/24/2019    Palpitations     Dr. Cheng PMD Benign     Seasonal allergies     Vertigo         Past Surgical History:   Procedure Laterality Date    BREAST BIOPSY Right     MALIGNANT    BREAST BIOPSY Right 2013    BENIGN    BREAST LUMPECTOMY Right 7/8/2019    Procedure: BREAST LUMPECTOMY WITH SENTINEL NODE BIOPSY AND NEEDLE LOCALIZATION;  Surgeon: Evelin Ayala MD;  Location[place] [facility] [place] [facility] [place];  Service: General    CARPAL TUNNEL RELEASE Right     CERVICAL CONE BIOPSY      CHOLECYSTECTOMY  1983    COLONOSCOPY      GALLBLADDER SURGERY      HYSTERECTOMY  2016    LAPAROSCOPIC TUBAL LIGATION      OOPHORECTOMY      AR TOTAL ABDOMINAL HYSTERECT W/WO RMVL TUBE OVARY Bilateral 7/28/2016    Procedure: TOTAL ABDOMINAL HYSTERECTOMY W/ BSO ;  Surgeon: George Strange MD;  Location: [facility] OR;  Service: Obstetrics/Gynecology    ROTATOR CUFF REPAIR Right     TONSILLECTOMY  1963        Current Outpatient Medications on File Prior to Visit   Medication Sig Dispense Refill    celecoxib (CeleBREX) 200 MG capsule Take 1 capsule by mouth Daily. PATIENT TO CONTACT DR. EVELIN AYALA REGARDING HOLDING PRIOR TO SURGERY      DULoxetine (CYMBALTA) 60 MG capsule Take 1 capsule by mouth Daily. LAST DOSE 7/17/16      irbesartan-hydrochlorothiazide (AVALIDE) 300-12.5 MG tablet       letrozole (FEMARA) 2.5 MG tablet Take 1 tablet by mouth Daily. 90 tablet 1    levothyroxine (SYNTHROID, LEVOTHROID) 88 MCG tablet Take 1 tablet by mouth Daily.      [DISCONTINUED] calcium carb-cholecalciferol 600-800 MG-UNIT tablet 2 tab po daily       No current facility-administered medications on file prior to visit.        ALLERGIES:  No Known Allergies     Social History     Socioeconomic History    Marital status:      Spouse name: Mendez    Number of children: 2    Years of education: High school   Tobacco Use    Smoking status: Never    Smokeless tobacco: Never   Vaping Use    Vaping status: Never  "Used   Substance and Sexual Activity    Alcohol use: No     Comment: maybe once a year    Drug use: No    Sexual activity: Yes     Partners: Male     Birth control/protection: Surgical     Comment:         Family History   Problem Relation Age of Onset    Lymphoma Paternal Uncle 30        non hodgkins    Lymphoma Nephew 16        non hodgkins    Stroke Mother     COPD Father     Breast cancer Neg Hx     Malig Hyperthermia Neg Hx         Review of Systems   Constitutional:  Negative for activity change, appetite change, chills, diaphoresis, fever and unexpected weight change.   HENT:  Negative for hearing loss, mouth sores, nosebleeds, sore throat and trouble swallowing.    Respiratory:  Negative for cough, chest tightness, shortness of breath and wheezing.    Cardiovascular:  Negative for chest pain, palpitations and leg swelling.   Gastrointestinal:  Negative for abdominal distention, abdominal pain, constipation, diarrhea, nausea and vomiting.   Endocrine: Positive for heat intolerance (see HPI).   Genitourinary:  Negative for difficulty urinating, dysuria, frequency, hematuria and urgency.   Musculoskeletal:  Positive for arthralgias (better). Negative for joint swelling.        No muscle weakness.   Skin:  Negative for rash and wound.   Neurological:  Negative for dizziness, seizures, syncope, speech difficulty, weakness, numbness and headaches.   Hematological:  Negative for adenopathy. Does not bruise/bleed easily.   Psychiatric/Behavioral:  Negative for behavioral problems, confusion, sleep disturbance and suicidal ideas.    All other systems reviewed and are negative.    ROS reviewed and updated 04/30/24    Objective     Vitals:    04/30/24 1505   BP: 113/79   Pulse: 79   Resp: 16   Temp: 97 °F (36.1 °C)   TempSrc: Temporal   SpO2: 96%   Weight: 102 kg (223 lb 14.4 oz)   Height: 157.5 cm (62.01\")   PainSc: 0-No pain              4/30/2024     2:41 PM   Current Status   ECOG score 0     Physical " examination    CONSTITUTIONAL:  Vital signs reviewed.  No distress, looks comfortable.  RESPIRATORY:  Normal respiratory effort.  Lungs clear to auscultation bilaterally.  CARDIOVASCULAR:  Normal S1, S2.  No murmurs rubs or gallops.  No significant lower extremity edema.  BREAST: Right breast: No skin changes, no evidence of breast mass, no nipple discharge, no evidence of any right axillary adenopathy or right supraclavicular adenopathy  Left breast: No evidence of any skin changes, no evidence of any left breast mass and no evidence of left nipple discharge as well as no left axillary adenopathy or left supraclavicular adenopathy.  GASTROINTESTINAL: Abdomen appears unremarkable.  Nontender.  No hepatomegaly.  No splenomegaly.  LYMPHATIC:  No cervical, supraclavicular, axillary lymphadenopathy.  SKIN:  Warm.  No rashes.  PSYCHIATRIC:  Normal judgment and insight.  Normal mood and affect.  I have reexamined the patient and the results are consistent with the previously documented exam. Reshma Morales MD       RECENT LABS:  Results from last 7 days   Lab Units 04/30/24  1500   WBC 10*3/mm3 7.55   NEUTROS ABS 10*3/mm3 4.72   HEMOGLOBIN g/dL 13.6   HEMATOCRIT % 40.9   PLATELETS 10*3/mm3 202       Results from last 7 days   Lab Units 04/30/24  1500   SODIUM mmol/L 142   POTASSIUM mmol/L 3.5   CHLORIDE mmol/L 101   CO2 mmol/L 28.1   BUN mg/dL 17   CREATININE mg/dL 0.92   CALCIUM mg/dL 9.7   ALBUMIN g/dL 3.9   BILIRUBIN mg/dL 0.5   ALK PHOS U/L 89   ALT (SGPT) U/L 34*   AST (SGOT) U/L 44*   GLUCOSE mg/dL 123*               Assessment & Plan     1.  T1 a N0 M0 invasive ductal carcinoma of the right breast.  Patient is status post lumpectomy with sentinel lymph node surgery on July 2019.  She is ER positive, NC positive HER-2/berenice negative.  -September 19, 2018 screening mammogram showed new irregular nodular density in the deep medial right breast which is suspicious and six-month follow-up suggested.  -November 7, 2018,  patient had right diagnostic mammogram which showed small ill-defined opacity in the deep medial right breast but ultrasound did not show that and six-month follow-up suggested  -Sabra 3, 2019, small irregular opacity in the 3:00 axis in the upper medial right breast, 8 mm, suspicious  -Biopsy consistent with invasive ductal carcinoma, 2.8 mm, grade 1, Meghan score of 3, associated DCIS, micro papillary and cribriform, ER +%, ND +41-50%, HER-2/berenice 1+ negative, Ki-67 1%  -July 2019, status post right lumpectomy with sentinel lymph nodes, 3 o'clock position, no invasive carcinoma identified on the lumpectomy, single focus of invasive carcinoma 2.8 mm seen at biopsy, DCIS present 7 mm, DCIS in 2 of the 27 blocks, no lymphovascular space invasion, margins clear except close anterior margin from the DCIS is 1.1 mm, 3 lymph nodes negative  Had a lengthy discussion with patient that she does not require any chemotherapy given the small size, low-grade nature of the tumor with it being ER ND positive HER-2/berenice negative.  XRT from 8/5/2019 - 9/3/2019  08/17/19 - started Arimidex  09/30/19 - Patient reports having gained about 20 pounds since the diagnosis of her cancer.  As a result, she also has arthralgia.  7/27/2020 patient complained of worsening arthralgias, particularly in her hips since starting Arimidex.  After discussion with Dr. Morales, we will have her hold Arimidex x1 month.  Then start Femara 2.5 mg daily.  I will see her back in 2 months for reevaluation.  11/15/2022 AMANDA per exam today.  Tolerating Femara well.  Continue.  May 2023: Patient is doing well with Femara.  Reviewed the results of mammogram from June 2022 which is negative  October 23, 2023: Patient is on Femara and tolerating well reviewed the mammogram from June 2023 and is negative.  April 30, 2024: Reviewed mammogram from June 12, 2023 which is negative.  Otherwise patient is doing well without any complaints.  She is done with 5 years  of Femara and she can discontinue.  She has side effects of hot flashes and prefers to not continue    2.  Osteopenia, reviewed bone density from December 2017 and she does have osteopenia.  We encouraged her to take calcium and vitamin D supplements.  DEXA scan December 2019 normal bone density.  Stable patient taking calcium and vitamin D  DEXA 5/2022 normal bone density    3.  Osteoarthritis.  Continue Celebrex and Cymbalta.    6.  Hot flashes secondary to AI therapy.  This is somewhat better with letrozole but still present.  Manageable.   Patient does not want to take any Effexor    7.  Hypothyroidism, stable    8.  Depression on Cymbalta well-controlled    9.  Mild hypercalcemia  May 2022 patient instructed to drop back from twice a day dosing to once a day dosing of calcium  11/15/2022 calcium level still too high today at 10.5.  Patient has been instructed to discontinue calcium just take vitamin D.  She is likely getting enough in her diet.    Plan   Discontinue letrozole as she completed 5 years almost   continue calcium and vitamin D supplements  Offered Effexor for hot flashes but patient refused.  Still continues with hot flashes  Reviewed mammogram from June 2023 which is negative  Patient is almost 5 years out and prefers to discontinue letrozole given side effects of significant hot flashes which is reasonable.  She will follow-up with her primary care physician for a screening mammogram which will be in end of June 2024 and we will release her from our office to be followed by primary care.  She is to have yearly breast exam from her primary care and yearly mammograms along with monthly self breast exam by patient    Reshma Morales MD                  CC:  MD Evelin Cortez MD Crystal McMahan, MD Robert Schweitzer, MD Janet Leon, MD

## 2024-06-04 ENCOUNTER — TRANSCRIBE ORDERS (OUTPATIENT)
Dept: BONE DENSITY | Facility: HOSPITAL | Age: 64
End: 2024-06-04
Payer: MEDICARE

## 2024-06-04 ENCOUNTER — TRANSCRIBE ORDERS (OUTPATIENT)
Dept: MAMMOGRAPHY | Facility: HOSPITAL | Age: 64
End: 2024-06-04
Payer: MEDICARE

## 2024-06-04 DIAGNOSIS — E03.8 ACQUIRED CENTRAL HYPOTHYROIDISM: ICD-10-CM

## 2024-06-04 DIAGNOSIS — Z78.0 MENOPAUSE: Primary | ICD-10-CM

## 2024-06-04 DIAGNOSIS — E23.0 SHEEHAN SYNDROME: ICD-10-CM

## 2024-06-04 DIAGNOSIS — Z12.31 ENCOUNTER FOR SCREENING MAMMOGRAM FOR MALIGNANT NEOPLASM OF BREAST: Primary | ICD-10-CM

## 2024-07-05 ENCOUNTER — HOSPITAL ENCOUNTER (OUTPATIENT)
Dept: MAMMOGRAPHY | Facility: HOSPITAL | Age: 64
Discharge: HOME | End: 2024-07-05
Payer: MEDICARE

## 2024-07-05 ENCOUNTER — APPOINTMENT (OUTPATIENT)
Dept: BONE DENSITY | Facility: HOSPITAL | Age: 64
End: 2024-07-05
Payer: MEDICARE

## 2024-07-05 DIAGNOSIS — E03.8 ACQUIRED CENTRAL HYPOTHYROIDISM: ICD-10-CM

## 2024-07-05 DIAGNOSIS — Z12.31 ENCOUNTER FOR SCREENING MAMMOGRAM FOR MALIGNANT NEOPLASM OF BREAST: ICD-10-CM

## 2024-07-05 DIAGNOSIS — E23.0 SHEEHAN SYNDROME: ICD-10-CM

## 2024-07-05 DIAGNOSIS — Z78.0 MENOPAUSE: ICD-10-CM

## 2024-07-05 PROCEDURE — 77080 DXA BONE DENSITY AXIAL: CPT

## 2024-07-05 PROCEDURE — 77067 SCR MAMMO BI INCL CAD: CPT

## 2024-07-05 PROCEDURE — 77063 BREAST TOMOSYNTHESIS BI: CPT

## 2025-08-06 ENCOUNTER — TRANSCRIBE ORDERS (OUTPATIENT)
Dept: MAMMOGRAPHY | Facility: HOSPITAL | Age: 65
End: 2025-08-06
Payer: MEDICARE

## 2025-08-06 DIAGNOSIS — Z12.31 SCREENING MAMMOGRAM, ENCOUNTER FOR: Primary | ICD-10-CM

## (undated) DEVICE — STCKNT IMPERV 9X36IN STRL

## (undated) DEVICE — ADHS SKIN DERMABOND TOP ADVANCED

## (undated) DEVICE — PK UNIV COMPL 40

## (undated) DEVICE — SUT MNCRYL PLS ANTIB UD 4/0 PS2 18IN

## (undated) DEVICE — GLV SURG SENSICARE POLYISPRN W/ALOE PF LF 6.5 GRN STRL

## (undated) DEVICE — APPL CHLORAPREP W/TINT 26ML ORNG

## (undated) DEVICE — IRRIGATOR BULB ASEPTO 60CC STRL

## (undated) DEVICE — STPLR SKIN VISISTAT WD 35CT

## (undated) DEVICE — STERILE ULTRASOUND GEL, SAFEWRAP: Brand: ECOVUE

## (undated) DEVICE — ANTIBACTERIAL UNDYED BRAIDED (POLYGLACTIN 910), SYNTHETIC ABSORBABLE SUTURE: Brand: COATED VICRYL

## (undated) DEVICE — GLV SURG SENSICARE MICRO PF LF 6.5 STRL

## (undated) DEVICE — ELECTRD BLD EDGE/INSUL1P 2.4X5.1MM STRL

## (undated) DEVICE — KT INK TISS MARGINMARKER STD 6COLOR

## (undated) DEVICE — LEGGINGS, PAIR, 29X43, STERILE: Brand: MEDLINE

## (undated) DEVICE — CVR PROB GEN PURP W ISOSILK 6X48

## (undated) DEVICE — SUT VIC 3/0 SH CR8 18IN J864D

## (undated) DEVICE — NDL HYPO ECLPS SFTY 22G 1 1/2IN

## (undated) DEVICE — CVR TRANSD CIV FLX TPR 11.9 TO 3.8X61CM